# Patient Record
Sex: FEMALE | Race: WHITE | Employment: FULL TIME | URBAN - METROPOLITAN AREA
[De-identification: names, ages, dates, MRNs, and addresses within clinical notes are randomized per-mention and may not be internally consistent; named-entity substitution may affect disease eponyms.]

---

## 2017-01-01 ENCOUNTER — HOSPITAL ENCOUNTER (EMERGENCY)
Age: 62
Discharge: HOME | End: 2017-01-01
Attending: EMERGENCY MEDICINE

## 2017-01-01 VITALS
OXYGEN SATURATION: 100 % | BODY MASS INDEX: 38.57 KG/M2 | SYSTOLIC BLOOD PRESSURE: 136 MMHG | HEIGHT: 66 IN | DIASTOLIC BLOOD PRESSURE: 73 MMHG | RESPIRATION RATE: 14 BRPM | HEART RATE: 72 BPM | TEMPERATURE: 98 F | WEIGHT: 240 LBS

## 2017-01-01 DIAGNOSIS — M54.16 LUMBAR RADICULOPATHY: Primary | ICD-10-CM

## 2017-01-01 LAB
BASOPHILS # BLD AUTO: 0.04 X10(3) UL (ref 0–0.1)
BASOPHILS NFR BLD AUTO: 0.4 %
BUN BLD-MCNC: 35 MG/DL (ref 8–20)
CALCIUM BLD-MCNC: 8.9 MG/DL (ref 8.3–10.3)
CHLORIDE: 105 MMOL/L (ref 101–111)
CO2: 27 MMOL/L (ref 22–32)
CREAT BLD-MCNC: 1.16 MG/DL (ref 0.55–1.02)
EOSINOPHIL # BLD AUTO: 0.3 X10(3) UL (ref 0–0.3)
EOSINOPHIL NFR BLD AUTO: 3.1 %
ERYTHROCYTE [DISTWIDTH] IN BLOOD BY AUTOMATED COUNT: 13.7 % (ref 11.5–16)
GLUCOSE BLD-MCNC: 99 MG/DL (ref 70–99)
HCT VFR BLD AUTO: 41.2 % (ref 34–50)
HGB BLD-MCNC: 13.7 G/DL (ref 12–16)
IMMATURE GRANULOCYTE COUNT: 0.04 X10(3) UL (ref 0–1)
IMMATURE GRANULOCYTE RATIO %: 0.4 %
LYMPHOCYTES # BLD AUTO: 2.49 X10(3) UL (ref 0.9–4)
LYMPHOCYTES NFR BLD AUTO: 25.7 %
MCH RBC QN AUTO: 30.6 PG (ref 27–33.2)
MCHC RBC AUTO-ENTMCNC: 33.3 G/DL (ref 31–37)
MCV RBC AUTO: 92.2 FL (ref 81–100)
MONOCYTES # BLD AUTO: 0.52 X10(3) UL (ref 0.1–0.6)
MONOCYTES NFR BLD AUTO: 5.4 %
NEUTROPHIL ABS PRELIM: 6.28 X10 (3) UL (ref 1.3–6.7)
NEUTROPHILS # BLD AUTO: 6.28 X10(3) UL (ref 1.3–6.7)
NEUTROPHILS NFR BLD AUTO: 65 %
PLATELET # BLD AUTO: 305 10(3)UL (ref 150–450)
POTASSIUM SERPL-SCNC: 3.4 MMOL/L (ref 3.6–5.1)
RBC # BLD AUTO: 4.47 X10(6)UL (ref 3.8–5.1)
RED CELL DISTRIBUTION WIDTH-SD: 46.6 FL (ref 35.1–46.3)
SODIUM SERPL-SCNC: 142 MMOL/L (ref 136–144)
WBC # BLD AUTO: 9.7 X10(3) UL (ref 4–13)

## 2017-01-01 PROCEDURE — 80048 BASIC METABOLIC PNL TOTAL CA: CPT | Performed by: EMERGENCY MEDICINE

## 2017-01-01 PROCEDURE — 85025 COMPLETE CBC W/AUTO DIFF WBC: CPT | Performed by: EMERGENCY MEDICINE

## 2017-01-01 PROCEDURE — 99284 EMERGENCY DEPT VISIT MOD MDM: CPT

## 2017-01-01 PROCEDURE — 96374 THER/PROPH/DIAG INJ IV PUSH: CPT

## 2017-01-01 PROCEDURE — 96375 TX/PRO/DX INJ NEW DRUG ADDON: CPT

## 2017-01-01 RX ORDER — ONDANSETRON 2 MG/ML
4 INJECTION INTRAMUSCULAR; INTRAVENOUS ONCE
Status: COMPLETED | OUTPATIENT
Start: 2017-01-01 | End: 2017-01-01

## 2017-01-01 RX ORDER — HYDROMORPHONE HYDROCHLORIDE 1 MG/ML
1 INJECTION, SOLUTION INTRAMUSCULAR; INTRAVENOUS; SUBCUTANEOUS EVERY 30 MIN PRN
Status: DISCONTINUED | OUTPATIENT
Start: 2017-01-01 | End: 2017-01-01

## 2017-01-01 RX ORDER — HYDROCODONE BITARTRATE AND ACETAMINOPHEN 5; 325 MG/1; MG/1
1-2 TABLET ORAL EVERY 4 HOURS PRN
Qty: 20 TABLET | Refills: 0 | Status: SHIPPED | OUTPATIENT
Start: 2017-01-01 | End: 2017-01-08

## 2017-01-01 NOTE — ED INITIAL ASSESSMENT (HPI)
Pt with lower back pain, history of bulging disc, putting off back surgery, leg has been feeling heavy. Lifted left leg today and pain was worse.

## 2017-01-02 NOTE — ED PROVIDER NOTES
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  Constitutional and vital signs reviewed. All other systems reviewed and negative except as noted above. PSFH elements reviewed from today and agreed except as otherwise stated in HPI.     Physical Exam       ED Triage Vitals   BP 01/01/17 1758 view results for these tests on the individual orders. MDM   Pain is improved here in the emergency department she is able to move her left leg.   She will be discharged to follow-up with the spine surgery group is to return to the emergency departmen

## 2017-01-07 ENCOUNTER — PRIOR ORIGINAL RECORDS (OUTPATIENT)
Dept: OTHER | Age: 62
End: 2017-01-07

## 2017-01-16 LAB
ALBUMIN: 4.1 G/DL
ALKALINE PHOSPHATATE(ALK PHOS): 83 IU/L
BILIRUBIN TOTAL: 0.7 MG/DL
BUN: 25 MG/DL
CALCIUM: 9.6 MG/DL
CHLORIDE: 104 MEQ/L
CHOLESTEROL, TOTAL: 266 MG/DL
CREATININE, SERUM: 0.87 MG/DL
GLOBULIN: 2.5 G/DL
GLUCOSE: 97 MG/DL
HDL CHOLESTEROL: 50 MG/DL
LDL CHOLESTEROL: 191 MG/DL
POTASSIUM, SERUM: 4.3 MEQ/L
PROTEIN, TOTAL: 6.6 G/DL
SGOT (AST): 21 IU/L
SGPT (ALT): 23 IU/L
SODIUM: 142 MEQ/L
TRIGLYCERIDES: 127 MG/DL

## 2017-01-26 ENCOUNTER — OFFICE VISIT (OUTPATIENT)
Dept: SURGERY | Facility: CLINIC | Age: 62
End: 2017-01-26

## 2017-01-26 VITALS
RESPIRATION RATE: 16 BRPM | HEIGHT: 66 IN | WEIGHT: 245 LBS | HEART RATE: 70 BPM | SYSTOLIC BLOOD PRESSURE: 122 MMHG | DIASTOLIC BLOOD PRESSURE: 84 MMHG | BODY MASS INDEX: 39.37 KG/M2

## 2017-01-26 DIAGNOSIS — D35.2 PITUITARY ADENOMA (HCC): ICD-10-CM

## 2017-01-26 DIAGNOSIS — M51.37 DDD (DEGENERATIVE DISC DISEASE), LUMBOSACRAL: ICD-10-CM

## 2017-01-26 DIAGNOSIS — M54.16 LUMBAR RADICULOPATHY: Primary | ICD-10-CM

## 2017-01-26 DIAGNOSIS — M41.9 SCOLIOSIS OF LUMBAR SPINE, UNSPECIFIED SCOLIOSIS TYPE: ICD-10-CM

## 2017-01-26 PROCEDURE — 99215 OFFICE O/P EST HI 40 MIN: CPT | Performed by: NURSE PRACTITIONER

## 2017-01-26 NOTE — PROGRESS NOTES
NEUROSURGERY CLINIC VISIT      Marciano Weiss  11/2/1955  * No surgery found *    Patient presents with:   Other: surgery discussion        HPI:   Marciano Weiss is a 62year old female here fo developed left hand N/T at the onset of her groin pain. Left hand numbness is to the whole hand and has slightly improved since onset. Subjective weakness to the left leg and low back. Left lateral thigh N/T down to the Left lateral calf.   Left lateral c MG) BY MOUTH EVERY 8 HOURS AS NEEDED FOR MUSCLE SPASM Disp: 90 tablet Rfl: 0   POTASSIUM CHLORIDE ER 10 MEQ Oral Tab CR TAKE 1 TABLET BY MOUTH TWICE DAILY Disp: 60 tablet Rfl: 0   bumetanide 2 MG Oral Tab TAKE 2 TABLETS BY MOUTH EVERY MORNING Disp: 180 tab Pulse 70  Resp 16  Ht 66\"  Wt 245 lb  BMI 39.56 kg/m2  LMP 01/20/2001 (Approximate)    NEUROLOGICAL:  This patient is alert and orientated x 3. Speech fluent. Comprehension intact. Pupils equally round and reactive to light. 3+ brisk bilaterally.   EOMs lateral recess. Remainder of MRI of the lumbar spine has not significantly changed redemonstrating disc bulges L3-4 L4-5 and L5-S1 with mild to moderate neural foraminal narrowing on the left at L4-5 and bilaterally at L5-S1.  There is mild right neural fo

## 2017-01-26 NOTE — PATIENT INSTRUCTIONS
Refill policies:    • Allow 2 business days for refills; controlled substances may take longer.   • Contact your pharmacy at least 5 days prior to running out of medication and have them send an electronic request or submit request through the “request re your physician has recommended that you have a procedure or additional testing performed. [facility]) will contact your insurance carrier to obtain pre-certification or prior authorization.     Unfortunately, LORI has seen an increas

## 2017-01-27 ENCOUNTER — TELEPHONE (OUTPATIENT)
Dept: SURGERY | Facility: CLINIC | Age: 62
End: 2017-01-27

## 2017-01-27 ENCOUNTER — PRIOR ORIGINAL RECORDS (OUTPATIENT)
Dept: OTHER | Age: 62
End: 2017-01-27

## 2017-01-27 NOTE — TELEPHONE ENCOUNTER
Prior authorization request for MRI Lumbar ([id number]) submitted via [employer]IM online, request pending clinical review, case determination expected by 1/31/17.

## 2017-01-30 RX ORDER — METHOCARBAMOL 500 MG/1
TABLET, FILM COATED ORAL
Qty: 90 TABLET | Refills: 0 | Status: SHIPPED | OUTPATIENT
Start: 2017-01-30 | End: 2017-03-26

## 2017-01-30 NOTE — TELEPHONE ENCOUNTER
Medication: Methocarbamol 500 MG    Date of last refill: 1/3/17  [place]    Last office visit: 3/28/16  Due back to clinic per last office note: Follow up in clinic 2-4 weeks after last injection.    Date next office

## 2017-01-30 NOTE — TELEPHONE ENCOUNTER
Left message at home number, MRI Lumbar ordered by Dr. Bernie Momin has been approved by your insurance, authorization # [id number] and is approved through 1.27.17-2.25.17. Please have procedure completed before 2.25.17.  Schedule at your convenience with the centr

## 2017-02-07 RX ORDER — POTASSIUM CHLORIDE 750 MG/1
TABLET, EXTENDED RELEASE ORAL
Qty: 60 TABLET | Refills: 0 | Status: SHIPPED | OUTPATIENT
Start: 2017-02-07 | End: 2017-03-26

## 2017-02-11 ENCOUNTER — TELEPHONE (OUTPATIENT)
Dept: SURGERY | Facility: CLINIC | Age: 62
End: 2017-02-11

## 2017-02-11 ENCOUNTER — HOSPITAL ENCOUNTER (OUTPATIENT)
Dept: MRI IMAGING | Facility: HOSPITAL | Age: 62
Discharge: HOME | End: 2017-02-11
Attending: NURSE PRACTITIONER
Payer: COMMERCIAL

## 2017-02-11 ENCOUNTER — LAB ENCOUNTER (OUTPATIENT)
Dept: LAB | Facility: HOSPITAL | Age: 62
End: 2017-02-11
Attending: NURSE PRACTITIONER
Payer: COMMERCIAL

## 2017-02-11 DIAGNOSIS — M41.9 SCOLIOSIS OF LUMBAR SPINE, UNSPECIFIED SCOLIOSIS TYPE: ICD-10-CM

## 2017-02-11 DIAGNOSIS — M46.46 DISCITIS OF LUMBAR REGION: Primary | ICD-10-CM

## 2017-02-11 DIAGNOSIS — M51.37 DDD (DEGENERATIVE DISC DISEASE), LUMBOSACRAL: ICD-10-CM

## 2017-02-11 DIAGNOSIS — M54.16 LUMBAR RADICULOPATHY: ICD-10-CM

## 2017-02-11 LAB
BASOPHILS # BLD AUTO: 0.04 X10(3) UL (ref 0–0.1)
BASOPHILS NFR BLD AUTO: 0.4 %
C-REACTIVE PROTEIN: 0.36 MG/DL (ref ?–1)
EOSINOPHIL # BLD AUTO: 0.36 X10(3) UL (ref 0–0.3)
EOSINOPHIL NFR BLD AUTO: 3.8 %
ERYTHROCYTE [DISTWIDTH] IN BLOOD BY AUTOMATED COUNT: 13.8 % (ref 11.5–16)
HCT VFR BLD AUTO: 40.1 % (ref 34–50)
HGB BLD-MCNC: 13.2 G/DL (ref 12–16)
IMMATURE GRANULOCYTE COUNT: 0.02 X10(3) UL (ref 0–1)
IMMATURE GRANULOCYTE RATIO %: 0.2 %
LYMPHOCYTES # BLD AUTO: 2.81 X10(3) UL (ref 0.9–4)
LYMPHOCYTES NFR BLD AUTO: 29.4 %
MCH RBC QN AUTO: 31.1 PG (ref 27–33.2)
MCHC RBC AUTO-ENTMCNC: 32.9 G/DL (ref 31–37)
MCV RBC AUTO: 94.6 FL (ref 81–100)
MONOCYTES # BLD AUTO: 0.63 X10(3) UL (ref 0.1–0.6)
MONOCYTES NFR BLD AUTO: 6.6 %
NEUTROPHIL ABS PRELIM: 5.7 X10 (3) UL (ref 1.3–6.7)
NEUTROPHILS # BLD AUTO: 5.7 X10(3) UL (ref 1.3–6.7)
NEUTROPHILS NFR BLD AUTO: 59.6 %
PLATELET # BLD AUTO: 275 10(3)UL (ref 150–450)
RBC # BLD AUTO: 4.24 X10(6)UL (ref 3.8–5.1)
RED CELL DISTRIBUTION WIDTH-SD: 47.4 FL (ref 35.1–46.3)
SED RATE-ML: 22 MM/HR (ref 0–25)
WBC # BLD AUTO: 9.6 X10(3) UL (ref 4–13)

## 2017-02-11 PROCEDURE — 85652 RBC SED RATE AUTOMATED: CPT

## 2017-02-11 PROCEDURE — 85025 COMPLETE CBC W/AUTO DIFF WBC: CPT

## 2017-02-11 PROCEDURE — 36415 COLL VENOUS BLD VENIPUNCTURE: CPT

## 2017-02-11 PROCEDURE — 72148 MRI LUMBAR SPINE W/O DYE: CPT

## 2017-02-11 PROCEDURE — 86140 C-REACTIVE PROTEIN: CPT

## 2017-02-11 NOTE — TELEPHONE ENCOUNTER
Spoke w/ Dr Juan C Forrester regarding MRI lumbar spine. Endplate irregularity at L2-3 that is progressed with fluid collection, minimal.  Possible discitis osteomyelitis cannot be entirely excluded. CBC, CRP, ESR ordered.  Spoke with pt regarding above she wi

## 2017-02-13 NOTE — TELEPHONE ENCOUNTER
Could you please call this pt and let her know her lab results were WNL. i have ordered MRI lumbar spine. She can f/u w/ me on a Thursday following this.

## 2017-02-20 DIAGNOSIS — M54.9 BACK PAIN, UNSPECIFIED BACK LOCATION, UNSPECIFIED BACK PAIN LATERALITY, UNSPECIFIED CHRONICITY: ICD-10-CM

## 2017-02-20 RX ORDER — DICLOFENAC SODIUM 75 MG/1
TABLET, DELAYED RELEASE ORAL
Qty: 60 TABLET | Refills: 1 | Status: SHIPPED | OUTPATIENT
Start: 2017-02-20 | End: 2017-06-20

## 2017-02-20 NOTE — TELEPHONE ENCOUNTER
Medication: Diclofenac    Date of last refill: 11/21/16 for #60/2 additional refills  [place]    Last office visit: 4/8/16  Due back to clinic per last office note:  PRN/1 year  Date next office visit scheduled:  not

## 2017-02-27 ENCOUNTER — TELEPHONE (OUTPATIENT)
Dept: SURGERY | Facility: CLINIC | Age: 62
End: 2017-02-27

## 2017-02-27 NOTE — TELEPHONE ENCOUNTER
MRI order is in system for MRI lumbar spine with contrast  Patient informed, wants to know if MRI has been authorized  Will check with Prior Auth dept and have them contact the patient.

## 2017-02-28 NOTE — TELEPHONE ENCOUNTER
Authorization #[id number] for MRI Lumbar contrast [id number] at [address] [place] [address] exp 3-29-17. Called patient gave her above information she will call and schedule.

## 2017-03-11 ENCOUNTER — PRIOR ORIGINAL RECORDS (OUTPATIENT)
Dept: OTHER | Age: 62
End: 2017-03-11

## 2017-03-13 LAB
ALBUMIN: 4.1 G/DL
ALKALINE PHOSPHATATE(ALK PHOS): 85 IU/L
ALT (SGPT): 25 U/L
AST (SGOT): 22 U/L
BILIRUBIN TOTAL: 0.6 MG/DL
BUN: 21 MG/DL
CALCIUM: 9.6 MG/DL
CHLORIDE: 105 MEQ/L
CHOLESTEROL, TOTAL: 202 MG/DL
CREATININE, SERUM: 0.93 MG/DL
GLOBULIN: 2.5 G/DL
GLUCOSE: 102 MG/DL
GLUCOSE: 102 MG/DL
HDL CHOLESTEROL: 48 MG/DL
LDL CHOLESTEROL: 130 MG/DL
NON-HDL CHOLESTEROL: 154 MG/DL
POTASSIUM, SERUM: 4.9 MEQ/L
PROTEIN, TOTAL: 6.6 G/DL
SGOT (AST): 22 IU/L
SGPT (ALT): 25 IU/L
SODIUM: 145 MEQ/L
TRIGLYCERIDES: 122 MG/DL

## 2017-03-18 ENCOUNTER — HOSPITAL ENCOUNTER (OUTPATIENT)
Dept: MRI IMAGING | Facility: HOSPITAL | Age: 62
Discharge: HOME | End: 2017-03-18
Attending: NURSE PRACTITIONER
Payer: COMMERCIAL

## 2017-03-18 DIAGNOSIS — M46.46 DISCITIS OF LUMBAR REGION: ICD-10-CM

## 2017-03-18 PROCEDURE — A9575 INJ GADOTERATE MEGLUMI 0.1ML: HCPCS | Performed by: NURSE PRACTITIONER

## 2017-03-18 PROCEDURE — 72158 MRI LUMBAR SPINE W/O & W/DYE: CPT

## 2017-03-21 ENCOUNTER — MYAURORA ACCOUNT LINK (OUTPATIENT)
Dept: OTHER | Age: 62
End: 2017-03-21

## 2017-03-21 ENCOUNTER — PRIOR ORIGINAL RECORDS (OUTPATIENT)
Dept: OTHER | Age: 62
End: 2017-03-21

## 2017-03-23 ENCOUNTER — TELEPHONE (OUTPATIENT)
Dept: SURGERY | Facility: CLINIC | Age: 62
End: 2017-03-23

## 2017-03-23 DIAGNOSIS — M54.16 LUMBAR RADICULOPATHY: Primary | ICD-10-CM

## 2017-03-23 DIAGNOSIS — M51.36 DDD (DEGENERATIVE DISC DISEASE), LUMBAR: ICD-10-CM

## 2017-03-23 NOTE — TELEPHONE ENCOUNTER
Spoke to patient regarding her MRI lumbar spine with and without contrast.  Extensive degenerative disease in the lumbar spine.   Dr. Ronaldo Buitrago and Dr. Cara Watters also reviewed MRI and recommendation is CT scan of the lumbar spine and she should follow-up with

## 2017-03-24 ENCOUNTER — TELEPHONE (OUTPATIENT)
Dept: NEUROLOGY | Facility: CLINIC | Age: 62
End: 2017-03-24

## 2017-03-24 NOTE — TELEPHONE ENCOUNTER
Spoke to pt, CT Lumbar ordered by Dr. Ernesto Turcios has been approved by your insurance, authorization # [id number] and is approved through 3.24.17-4.22.17. Please have procedure completed before 4.22.17.  Schedule at your convenience with the central scheduling dep

## 2017-03-26 ENCOUNTER — PATIENT MESSAGE (OUTPATIENT)
Dept: SURGERY | Facility: CLINIC | Age: 62
End: 2017-03-26

## 2017-03-27 NOTE — TELEPHONE ENCOUNTER
From: Dick Talavera  To: Jonathan Potter MD  Sent: 3/26/2017 7:23 AM CDT  Subject: Test Results Question    If possible this message is for Paulette Sol from Dr. Kym Lefort office. ..   I have scheduled my CT scan for April 3rd and per your instructions I have s

## 2017-03-27 NOTE — TELEPHONE ENCOUNTER
Medication: Methocarbamol 500 MG    Date of last refill: 1/30/17  [place]    Last office visit: 3/28/16  Due back to clinic per last office note:  2-4 weeks after last injection  Date next office visit scheduled:  Not

## 2017-03-28 RX ORDER — POTASSIUM CHLORIDE 750 MG/1
TABLET, EXTENDED RELEASE ORAL
Qty: 60 TABLET | Refills: 0 | Status: SHIPPED | OUTPATIENT
Start: 2017-03-28 | End: 2017-06-11

## 2017-03-28 RX ORDER — METHOCARBAMOL 500 MG/1
TABLET, FILM COATED ORAL
Qty: 90 TABLET | Refills: 0 | Status: SHIPPED | OUTPATIENT
Start: 2017-03-28 | End: 2017-06-20

## 2017-04-03 ENCOUNTER — HOSPITAL ENCOUNTER (OUTPATIENT)
Dept: CT IMAGING | Facility: HOSPITAL | Age: 62
Discharge: HOME | End: 2017-04-03
Attending: NURSE PRACTITIONER
Payer: COMMERCIAL

## 2017-04-03 DIAGNOSIS — M54.16 LUMBAR RADICULOPATHY: ICD-10-CM

## 2017-04-03 DIAGNOSIS — M51.36 DDD (DEGENERATIVE DISC DISEASE), LUMBAR: ICD-10-CM

## 2017-04-03 PROCEDURE — 72131 CT LUMBAR SPINE W/O DYE: CPT

## 2017-04-13 PROCEDURE — 82085 ASSAY OF ALDOLASE: CPT | Performed by: INTERNAL MEDICINE

## 2017-04-15 ENCOUNTER — MYAURORA ACCOUNT LINK (OUTPATIENT)
Dept: OTHER | Age: 62
End: 2017-04-15

## 2017-04-15 ENCOUNTER — HOSPITAL ENCOUNTER (OUTPATIENT)
Dept: CV DIAGNOSTICS | Facility: HOSPITAL | Age: 62
Discharge: HOME | End: 2017-04-15
Attending: INTERNAL MEDICINE

## 2017-04-15 DIAGNOSIS — I48.0 PAROXYSMAL ATRIAL FIBRILLATION (HCC): ICD-10-CM

## 2017-04-15 DIAGNOSIS — R60.9 EDEMA, UNSPECIFIED TYPE: ICD-10-CM

## 2017-04-15 DIAGNOSIS — I10 ESSENTIAL HYPERTENSION, BENIGN: ICD-10-CM

## 2017-04-19 ENCOUNTER — PATIENT MESSAGE (OUTPATIENT)
Dept: FAMILY MEDICINE CLINIC | Facility: CLINIC | Age: 62
End: 2017-04-19

## 2017-04-19 RX ORDER — FLUCONAZOLE 150 MG/1
150 TABLET ORAL
Qty: 2 TABLET | Refills: 0 | Status: SHIPPED | OUTPATIENT
Start: 2017-04-19 | End: 2017-04-23

## 2017-04-19 NOTE — TELEPHONE ENCOUNTER
From: Ranjeet Gonzalez  To: Romero Rm MD  Sent: 4/19/2017 5:47 AM CDT  Subject: Prescription Question    I have a dental appointment today and I have to pre-medicate with 800 mg Amoxicillin.  I will get a yeast infection if I do not take the pill to

## 2017-04-24 RX ORDER — DICLOFENAC SODIUM 75 MG/1
TABLET, DELAYED RELEASE ORAL
Qty: 60 TABLET | Refills: 0 | OUTPATIENT
Start: 2017-04-24

## 2017-04-24 NOTE — TELEPHONE ENCOUNTER
Confirmed with patient she is following Dr. Garland Jewell and is prescribing her medications at this time. Refused medication. Per LOV on 04/08/17 by Dr. Rebel Barone;   She has three episodes of double vision and bluured vision as well as tiredness in pm, better i

## 2017-04-25 ENCOUNTER — OFFICE VISIT (OUTPATIENT)
Dept: SURGERY | Facility: CLINIC | Age: 62
End: 2017-04-25

## 2017-04-25 ENCOUNTER — TELEPHONE (OUTPATIENT)
Dept: SURGERY | Facility: CLINIC | Age: 62
End: 2017-04-25

## 2017-04-25 VITALS
SYSTOLIC BLOOD PRESSURE: 110 MMHG | HEIGHT: 66 IN | RESPIRATION RATE: 16 BRPM | HEART RATE: 60 BPM | WEIGHT: 250 LBS | BODY MASS INDEX: 40.18 KG/M2 | DIASTOLIC BLOOD PRESSURE: 68 MMHG

## 2017-04-25 DIAGNOSIS — M54.16 LUMBAR RADICULOPATHY: ICD-10-CM

## 2017-04-25 DIAGNOSIS — M47.816 LUMBAR FACET ARTHROPATHY: ICD-10-CM

## 2017-04-25 DIAGNOSIS — M41.9 SCOLIOSIS OF LUMBOSACRAL SPINE, UNSPECIFIED SCOLIOSIS TYPE: Primary | ICD-10-CM

## 2017-04-25 DIAGNOSIS — M51.37 DDD (DEGENERATIVE DISC DISEASE), LUMBOSACRAL: ICD-10-CM

## 2017-04-25 PROCEDURE — 99214 OFFICE O/P EST MOD 30 MIN: CPT | Performed by: NURSE PRACTITIONER

## 2017-04-25 RX ORDER — PRAVASTATIN SODIUM 20 MG
20 TABLET ORAL NIGHTLY
Refills: 6 | COMMUNITY
Start: 2017-02-20 | End: 2018-03-07

## 2017-04-25 NOTE — PROGRESS NOTES
Pt is here for follow up on testing (mri and ct)  Pain currently 5/10 mostly lower back and legs, no changes in pain since last visit

## 2017-04-25 NOTE — PATIENT INSTRUCTIONS
Refill policies:    • Allow 2 business days for refills; controlled substances may take longer.   • Contact your pharmacy at least 5 days prior to running out of medication and have them send an electronic request or submit request through the “request re insurance carrier to obtain pre-certification or prior authorization. Unfortunately, LORI has seen an increase in denial of payment even though the procedure/test has been pre-certified.   You are strongly encouraged to contact your insurance carrier to v prior to your procedure.       Medication:   Number of days you need to be off for the following medications:  • Aggrenox 10 days   • Agrylin (Anagrelide) 10 days   • Enbrel (Etanercept) 24 hours   • Fragmin (Dalteparin) 24 hours   • Pletal (Cilostazol) 7 d sugar.  Contact your primary care physician if your blood sugar rises as you may require some medication adjustment.   It is normal to have increased pain at injection site for up to 3-5 days after procedure, you can use heat for the first 24 hours (20 forrest patients choose to receive intravenous sedation that can make the procedure easier to tolerate. This type of sedation may cause amnesia and patients may not remember parts or all of the actual procedure. How is the epidural injection performed?   It is do injections have not helped you very much, you may be a candidate for a different procedure. It is important to keep follow up appointment to clearly communicate with your provider how you are feeling. Will the epidural injection help me?   It is very diffi

## 2017-04-25 NOTE — TELEPHONE ENCOUNTER
Medical clearance letter electronically faxed to Dr. Rebekah Monsalve office. Pt takes ASA 325mg. Spoke w/ pt regarding need for medical clearance prior to procedures with Dr. Parmjit Boateng. Pt verbalized understanding.

## 2017-04-25 NOTE — PROGRESS NOTES
NEUROSURGERY CLINIC VISIT      Tori Key  11/2/1955  * No surgery found *    Patient presents with:  Test Results: follow up after test        HPI:   Tori Key is a 62year old femal localized, unspecified site    • Back problem    • Cancer ([facility] [address] 2012     skin cancer removed  from leg   • Hip arthrosis    • Cellulitis      Rt. leg ankle   • Arrhythmia      A-fib   • Visual impairment    • PONV (postoperative nausea and vomiting)    • M mg by mouth daily.  Disp:  Rfl:    DICLOFENAC SODIUM 75 MG Oral Tab EC TAKE 1 TABLET BY MOUTH TWICE DAILY Disp: 60 tablet Rfl: 1     Family History   Problem Relation Age of Onset   • Hypertension Mother    • Hypertension Father        Smoking Status: Sharron Walker lumbosacral spine, unspecified scoliosis type    2. Lumbar radiculopathy    3. DDD (degenerative disc disease), lumbosacral    4. Lumbar facet arthropathy      1. Follow up after injection with Dr. Ciarra Chan  2. Lumbar spine with flexion and extension   3.   Re

## 2017-04-26 NOTE — TELEPHONE ENCOUNTER
Spoke to Farrukh Grant at Dr Selena Carlisle office in re to Medical Clearance Letter sent 4/25 for Dr Glenn Henriquez upcoming procedures-Mayte asked letter be refaxed-Medical Clearance Letter refaxed to [phone]-fax complete

## 2017-04-28 ENCOUNTER — TELEPHONE (OUTPATIENT)
Dept: NEUROLOGY | Facility: CLINIC | Age: 62
End: 2017-04-28

## 2017-04-28 NOTE — TELEPHONE ENCOUNTER
Response received from Dr Shashank Hess in re to Medical Clearance Letter sent 4/25 for upcoming Dr Elsa Mendoza procedures,endorsed to Pain Nurse Medical Clearance folder in [address] [place] [address]

## 2017-05-01 NOTE — TELEPHONE ENCOUNTER
LMTCB. Per Dr. Malissa Garcia, he may see patient as second opinion from Dr. Rhea Knight but does note that they are both neuromuscular specialists. Per Epic review patient was encouraged to seek outside second opinion at tertiary center.

## 2017-05-01 NOTE — TELEPHONE ENCOUNTER
Discussed with Yumi recommendations for outside second opinion at tertiary center. Patient states she will investigate and make appointment. Invited patient to make appointment with local neurologist if needed. Patient appreciative of discussion.

## 2017-05-04 NOTE — TELEPHONE ENCOUNTER
Spoke with patient and informed patient of message below. Pt verbalized understanding. No further questions at this time. Document sent to scanning.

## 2017-05-05 ENCOUNTER — PATIENT MESSAGE (OUTPATIENT)
Dept: SURGERY | Facility: CLINIC | Age: 62
End: 2017-05-05

## 2017-05-08 NOTE — TELEPHONE ENCOUNTER
Pt needs to have medical records sent to Dr. Migel Armstrong. Form needs to be signed, in [address].

## 2017-05-08 NOTE — TELEPHONE ENCOUNTER
From: Milan Douglas  To: MOISE Corey  Sent: 5/5/2017 6:16 PM CDT  Subject: Visit Follow-up Question    Jodi Pepe,  I have made an appointment with Dr. Gustavo An in [address] [place] [address] he is a Neuro doc that specializeds in NMD he is with [facility].  I h

## 2017-05-15 ENCOUNTER — TELEPHONE (OUTPATIENT)
Dept: NEUROLOGY | Facility: CLINIC | Age: 62
End: 2017-05-15

## 2017-05-15 NOTE — TELEPHONE ENCOUNTER
Faxed TENA to number indicated. Fax confirmation received.
Patient states she cannot come in person to sign TENA  Informed her that we could fax it to her and she could fax it back  She will call with fax # when she gets back to her office.
16

## 2017-05-18 ENCOUNTER — TELEPHONE (OUTPATIENT)
Dept: SURGERY | Facility: CLINIC | Age: 62
End: 2017-05-18

## 2017-05-30 RX ORDER — GABAPENTIN 300 MG/1
CAPSULE ORAL
Qty: 90 CAPSULE | Refills: 0 | OUTPATIENT
Start: 2017-05-30

## 2017-06-01 ENCOUNTER — TELEPHONE (OUTPATIENT)
Dept: NEUROLOGY | Facility: CLINIC | Age: 62
End: 2017-06-01

## 2017-06-01 NOTE — TELEPHONE ENCOUNTER
[facility]    [facility]#[id number] DOS 6.5.17

## 2017-06-05 ENCOUNTER — APPOINTMENT (OUTPATIENT)
Dept: GENERAL RADIOLOGY | Facility: HOSPITAL | Age: 62
End: 2017-06-05
Attending: ANESTHESIOLOGY
Payer: COMMERCIAL

## 2017-06-05 ENCOUNTER — HOSPITAL ENCOUNTER (OUTPATIENT)
Dept: GENERAL RADIOLOGY | Facility: HOSPITAL | Age: 62
Discharge: HOME | End: 2017-06-05
Attending: NURSE PRACTITIONER
Payer: COMMERCIAL

## 2017-06-05 ENCOUNTER — HOSPITAL ENCOUNTER (OUTPATIENT)
Facility: HOSPITAL | Age: 62
Setting detail: HOSPITAL OUTPATIENT SURGERY
Discharge: HOME | End: 2017-06-05
Attending: ANESTHESIOLOGY | Admitting: ANESTHESIOLOGY
Payer: COMMERCIAL

## 2017-06-05 ENCOUNTER — SURGERY (OUTPATIENT)
Age: 62
End: 2017-06-05

## 2017-06-05 VITALS
TEMPERATURE: 98 F | DIASTOLIC BLOOD PRESSURE: 63 MMHG | OXYGEN SATURATION: 100 % | HEART RATE: 57 BPM | RESPIRATION RATE: 18 BRPM | SYSTOLIC BLOOD PRESSURE: 124 MMHG

## 2017-06-05 DIAGNOSIS — M47.816 LUMBAR FACET ARTHROPATHY: ICD-10-CM

## 2017-06-05 DIAGNOSIS — M54.16 LUMBAR RADICULOPATHY: ICD-10-CM

## 2017-06-05 DIAGNOSIS — M41.9 SCOLIOSIS OF LUMBOSACRAL SPINE, UNSPECIFIED SCOLIOSIS TYPE: ICD-10-CM

## 2017-06-05 DIAGNOSIS — M51.37 DDD (DEGENERATIVE DISC DISEASE), LUMBOSACRAL: ICD-10-CM

## 2017-06-05 PROCEDURE — 72114 X-RAY EXAM L-S SPINE BENDING: CPT | Performed by: NURSE PRACTITIONER

## 2017-06-05 PROCEDURE — 99152 MOD SED SAME PHYS/QHP 5/>YRS: CPT | Performed by: ANESTHESIOLOGY

## 2017-06-05 PROCEDURE — 3E0S3BZ INTRODUCTION OF ANESTHETIC AGENT INTO EPIDURAL SPACE, PERCUTANEOUS APPROACH: ICD-10-PCS | Performed by: ANESTHESIOLOGY

## 2017-06-05 PROCEDURE — 3E0S33Z INTRODUCTION OF ANTI-INFLAMMATORY INTO EPIDURAL SPACE, PERCUTANEOUS APPROACH: ICD-10-PCS | Performed by: ANESTHESIOLOGY

## 2017-06-05 RX ORDER — LIDOCAINE HYDROCHLORIDE 10 MG/ML
INJECTION, SOLUTION EPIDURAL; INFILTRATION; INTRACAUDAL; PERINEURAL AS NEEDED
Status: DISCONTINUED | OUTPATIENT
Start: 2017-06-05 | End: 2017-06-05 | Stop reason: HOSPADM

## 2017-06-05 RX ORDER — SODIUM CHLORIDE, SODIUM LACTATE, POTASSIUM CHLORIDE, CALCIUM CHLORIDE 600; 310; 30; 20 MG/100ML; MG/100ML; MG/100ML; MG/100ML
100 INJECTION, SOLUTION INTRAVENOUS CONTINUOUS
Status: DISCONTINUED | OUTPATIENT
Start: 2017-06-05 | End: 2017-06-05

## 2017-06-05 RX ORDER — DEXAMETHASONE SODIUM PHOSPHATE 10 MG/ML
INJECTION, SOLUTION INTRAMUSCULAR; INTRAVENOUS AS NEEDED
Status: DISCONTINUED | OUTPATIENT
Start: 2017-06-05 | End: 2017-06-05 | Stop reason: HOSPADM

## 2017-06-05 NOTE — H&P
History & Physical Examination    Patient Name: Miriam Ledezma  MRN: [id number]  CSN: [id number]  YOB: 1955    Pre-Operative Diagnosis:  Lumbar radiculopathy [M54.16]  DDD (degenerative disc disease), lumbosacral [M51.37]  Lumbar facet arthro Allergies:   Adhesive Tape           Rash, Itching  Grass                   Itching, Other (See Comments)    Comment:Headaches with blood shot eyes  Latex                   Itching  Other                       Comment:Patient experiences a yeast infe normal, please explain:   KENDAL [x ] [x ]    Clifton Floyd [ ] [ ] Tenderness to palpation bilateral lumbar areas, pain radiates down the legs to the feet bilaterally. Patient experiences muscle tightness in the legs and numbness in the left leg.     HEART [x

## 2017-06-06 NOTE — OPERATIVE REPORT
[facility]  Operative Report  2017     Radha Rayo Patient Status:  Hospital Outpatient Surgery    1955 MRN [id number]   Location [address] [place] [address] Attending No att. providers found   Hosp Day # 0 PCP Pranav Ward position was confirmed with AP and lateral view under fluoroscopy. Omnipaque 180 was injected in 1 mL volume. A good epidurogram was obtained.   Thereafter, dexamethasone 10 mg with normal saline of 5 mL in total volume of 6 mL was injected through the [employer]

## 2017-06-12 RX ORDER — POTASSIUM CHLORIDE 750 MG/1
TABLET, EXTENDED RELEASE ORAL
Qty: 60 TABLET | Refills: 0 | Status: SHIPPED | OUTPATIENT
Start: 2017-06-12 | End: 2017-07-31

## 2017-06-12 NOTE — TELEPHONE ENCOUNTER
Refill request please approve or deny  LOV- 11/28/2016  Last refilled- 3/28/2017  Last labs- 4/13/2017

## 2017-06-20 ENCOUNTER — OFFICE VISIT (OUTPATIENT)
Dept: SURGERY | Facility: CLINIC | Age: 62
End: 2017-06-20

## 2017-06-20 VITALS — HEART RATE: 64 BPM | SYSTOLIC BLOOD PRESSURE: 120 MMHG | DIASTOLIC BLOOD PRESSURE: 70 MMHG | RESPIRATION RATE: 17 BRPM

## 2017-06-20 DIAGNOSIS — M47.816 LUMBAR FACET ARTHROPATHY: Primary | ICD-10-CM

## 2017-06-20 DIAGNOSIS — M51.37 DDD (DEGENERATIVE DISC DISEASE), LUMBOSACRAL: ICD-10-CM

## 2017-06-20 DIAGNOSIS — M54.16 LUMBAR RADICULOPATHY: ICD-10-CM

## 2017-06-20 DIAGNOSIS — M41.9 SCOLIOSIS OF LUMBOSACRAL SPINE, UNSPECIFIED SCOLIOSIS TYPE: ICD-10-CM

## 2017-06-20 PROCEDURE — 99213 OFFICE O/P EST LOW 20 MIN: CPT | Performed by: NEUROLOGICAL SURGERY

## 2017-06-20 NOTE — PATIENT INSTRUCTIONS
Refill policies:    • Allow 2-3 business days for refills; controlled substances may take longer.   • Contact your pharmacy at least 5 days prior to running out of medication and have them send an electronic request or submit request through the [facility] have a procedure or additional testing performed. [facility]) will contact your insurance carrier to obtain pre-certification or prior authorization.     Unfortunately, LORI has seen an increase in denial of payment even though the p

## 2017-06-20 NOTE — PROGRESS NOTES
Neurosurgery Clinic Visit  2017    Radha Rayo PCP:  Ford Duarte MD    1955 MRN [id number]     HISTORY OF PRESENT ILLNESS:  Radha Rayo is a(n) 62year old female who is here for follow-up of back and leg pain.   She continues to h and L5-S1.      ASSESSMENT and PLAN:  1. Low back pain. 2.  Left leg radiculopathy. 3.  Bilateral muscle tightness of arms/legs. 4.  Lumbar spondylosis. Reviewed imaging with the patient.   She has lumbar degenerative changes, and these are contribut

## 2017-07-31 RX ORDER — POTASSIUM CHLORIDE 750 MG/1
TABLET, EXTENDED RELEASE ORAL
Qty: 60 TABLET | Refills: 0 | Status: SHIPPED | OUTPATIENT
Start: 2017-07-31 | End: 2017-09-06

## 2017-08-13 ENCOUNTER — APPOINTMENT (OUTPATIENT)
Dept: GENERAL RADIOLOGY | Age: 62
End: 2017-08-13
Attending: PHYSICIAN ASSISTANT
Payer: COMMERCIAL

## 2017-08-13 ENCOUNTER — HOSPITAL ENCOUNTER (EMERGENCY)
Age: 62
Discharge: HOME | End: 2017-08-13
Attending: EMERGENCY MEDICINE
Payer: COMMERCIAL

## 2017-08-13 ENCOUNTER — APPOINTMENT (OUTPATIENT)
Dept: ULTRASOUND IMAGING | Age: 62
End: 2017-08-13
Attending: PHYSICIAN ASSISTANT
Payer: COMMERCIAL

## 2017-08-13 VITALS
RESPIRATION RATE: 16 BRPM | OXYGEN SATURATION: 100 % | HEIGHT: 66 IN | BODY MASS INDEX: 38.57 KG/M2 | SYSTOLIC BLOOD PRESSURE: 144 MMHG | TEMPERATURE: 99 F | WEIGHT: 240 LBS | HEART RATE: 80 BPM | DIASTOLIC BLOOD PRESSURE: 74 MMHG

## 2017-08-13 DIAGNOSIS — M79.605 PAIN OF LEFT LOWER EXTREMITY DUE TO INJURY: Primary | ICD-10-CM

## 2017-08-13 PROCEDURE — 99284 EMERGENCY DEPT VISIT MOD MDM: CPT

## 2017-08-13 PROCEDURE — 96365 THER/PROPH/DIAG IV INF INIT: CPT

## 2017-08-13 PROCEDURE — 73560 X-RAY EXAM OF KNEE 1 OR 2: CPT | Performed by: PHYSICIAN ASSISTANT

## 2017-08-13 PROCEDURE — 73502 X-RAY EXAM HIP UNI 2-3 VIEWS: CPT | Performed by: PHYSICIAN ASSISTANT

## 2017-08-13 PROCEDURE — 93971 EXTREMITY STUDY: CPT | Performed by: PHYSICIAN ASSISTANT

## 2017-08-13 RX ORDER — CEPHALEXIN 500 MG/1
500 CAPSULE ORAL 3 TIMES DAILY
Qty: 21 CAPSULE | Refills: 0 | Status: SHIPPED | OUTPATIENT
Start: 2017-08-13 | End: 2017-08-20

## 2017-08-13 RX ORDER — FLUCONAZOLE 200 MG/1
200 TABLET ORAL DAILY
Qty: 1 TABLET | Refills: 0 | Status: SHIPPED | OUTPATIENT
Start: 2017-08-13 | End: 2017-09-06

## 2017-08-13 RX ORDER — TRAMADOL HYDROCHLORIDE 50 MG/1
TABLET ORAL EVERY 4 HOURS PRN
Qty: 20 TABLET | Refills: 0 | Status: SHIPPED | OUTPATIENT
Start: 2017-08-13 | End: 2017-08-20

## 2017-08-13 NOTE — ED PROVIDER NOTES
Patient Seen in: [facility] Emergency Department In [place]    History   Patient presents with:  Fall (musculoskeletal, neurologic)  Lower Extremity Injury (musculoskeletal)  Back Pain (musculoskeletal)    Stated Complaint: left leg injury and back pain (three) times daily. TraMADol HCl 50 MG Oral Tab,  Take 1-2 tablets ( mg total) by mouth every 4 (four) hours as needed for Pain.    POTASSIUM CHLORIDE ER 10 MEQ Oral Tab CR,  TAKE 1 TABLET BY MOUTH TWICE DAILY   SYNTHROID 150 MCG Oral Tab,  Take 1 2 Views), Left (emx=08067)    Result Date: 8/13/2017  PROCEDURE:  XR KNEE (1 OR 2 VIEWS), LEFT (CPT=73560)  COMPARISON:  None.   INDICATIONS:  left leg injury and back pain  PATIENT STATED HISTORY: (As transcribed by Technologist)  Patient fell last week in Oscar Ramos MD on 8/13/2017 at 13:01     Approved by: Oscar Ramos MD            Xr Hip W Or Wo Pelvis 2 Or 3 Views, Left (cpt=73502)    Result Date: 8/13/2017  PROCEDURE:  XR HIP W OR WO PELVIS 2 OR 3 VIEWS, LEFT (CPT=73502)  TECHNIQUE:  Unilateral 2 knee will be performed to rule out traumatic etiology. I am concerned for early cellulitis left lower extremity. A gram of Rocephin will be initiated. Patient requested ultrasound of the left lower extremity. No evidence of acute DVT.   Plain films of t

## 2017-08-14 ENCOUNTER — TELEPHONE (OUTPATIENT)
Dept: SURGERY | Facility: CLINIC | Age: 62
End: 2017-08-14

## 2017-08-15 NOTE — TELEPHONE ENCOUNTER
Patient states she had epidural done by Dr. Sylvia Car in June and  she had discussed pain management with MOISE Roper at that time. She is taking Aleve 12 tabs/ day for her back pain. She fell recently and was seen in ER, and was given Tramadol for her leg pain.

## 2017-08-16 NOTE — TELEPHONE ENCOUNTER
Patient understood and stated she thought Mina Spurling, APN with with the pain service. Informed her she is no longer working with the pain service. Patient understood.  Informed her I would relay message on to pain service for refill request.

## 2017-08-16 NOTE — TELEPHONE ENCOUNTER
Patient has not been seen by pain service in over a year and will need an office visit before we can determne if she will qualify medications will be prescribed. She was seen by Magui Haddad and  on 4/25/17.

## 2017-08-17 NOTE — TELEPHONE ENCOUNTER
Spoke with patient and informed patient of message below. Pt verbalized understanding and agreeable to plan of f/u OV to discuss medications and updated imaging. Call transferred to [facility] for scheduling. No further questions at this time.

## 2017-08-24 ENCOUNTER — OFFICE VISIT (OUTPATIENT)
Dept: SURGERY | Facility: CLINIC | Age: 62
End: 2017-08-24

## 2017-08-24 ENCOUNTER — TELEPHONE (OUTPATIENT)
Dept: SURGERY | Facility: CLINIC | Age: 62
End: 2017-08-24

## 2017-08-24 VITALS
RESPIRATION RATE: 18 BRPM | SYSTOLIC BLOOD PRESSURE: 132 MMHG | HEART RATE: 70 BPM | WEIGHT: 268 LBS | DIASTOLIC BLOOD PRESSURE: 86 MMHG | BODY MASS INDEX: 43.07 KG/M2 | OXYGEN SATURATION: 99 % | HEIGHT: 66 IN

## 2017-08-24 DIAGNOSIS — M54.16 LUMBAR RADICULOPATHY: Primary | ICD-10-CM

## 2017-08-24 DIAGNOSIS — M46.1 SACROILIITIS (HCC): ICD-10-CM

## 2017-08-24 DIAGNOSIS — M47.816 LUMBAR FACET ARTHROPATHY: ICD-10-CM

## 2017-08-24 PROCEDURE — 99214 OFFICE O/P EST MOD 30 MIN: CPT | Performed by: NURSE PRACTITIONER

## 2017-08-24 RX ORDER — METHOCARBAMOL 750 MG/1
750 TABLET, FILM COATED ORAL 3 TIMES DAILY PRN
Qty: 90 TABLET | Refills: 0 | Status: SHIPPED | OUTPATIENT
Start: 2017-08-24 | End: 2017-10-06

## 2017-08-24 RX ORDER — HYDROCODONE BITARTRATE AND ACETAMINOPHEN 10; 325 MG/1; MG/1
1 TABLET ORAL EVERY 6 HOURS PRN
COMMUNITY
End: 2017-09-06

## 2017-08-24 RX ORDER — PHENTERMINE HYDROCHLORIDE 37.5 MG/1
37.5 CAPSULE ORAL EVERY MORNING
COMMUNITY
End: 2019-01-23

## 2017-08-24 RX ORDER — TRAMADOL HYDROCHLORIDE 50 MG/1
TABLET ORAL
Qty: 120 TABLET | Refills: 0 | Status: SHIPPED | OUTPATIENT
Start: 2017-08-24 | End: 2017-10-03

## 2017-08-24 NOTE — PATIENT INSTRUCTIONS
Refill policies:    • Allow 2-3 business days for refills; controlled substances may take longer.   • Contact your pharmacy at least 5 days prior to running out of medication and have them send an electronic request or submit request through the [facility] have a procedure or additional testing performed. [facility]) will contact your insurance carrier to obtain pre-certification or prior authorization.     Unfortunately, LORI has seen an increase in denial of payment even though the p

## 2017-08-24 NOTE — PROGRESS NOTES
Name: Carlitos Corral   : 1955   DOS: 2017     Pain Clinic Follow Up Visit:   Carlitos Corral is a 62year old female who presents for recheck of her chronic low back pain.  She states that most of her pain is in the pelvis and feels like menst TraMADol HCl 50 MG Oral Tab Take 1-2 tablets every 6 hours if needed for pain. Disp: 120 tablet Rfl: 0   fluconazole (DIFLUCAN) 200 MG Oral Tab Take 1 tablet (200 mg total) by mouth daily.  Disp: 1 tablet Rfl: 0   POTASSIUM CHLORIDE ER 10 MEQ Oral Tab CR 6:29.     INDICATIONS:  Low back pain radiating into the left lower extremity. History of disc disease most prominently at L2-3, with concern for possible development of discitis/osteomyelitis on recent MRI of 2/11/17.      TECHNIQUE:  Multiplanar T1 and T2 paraspinal region   at this level, likely due to the chronic degenerative changes. 3. Multilevel subarticular neural foraminal stenosis of the mid to lower lumbar spine without significant interval change.   4. If there is continued clinical concern for th 10/24/2017).

## 2017-08-25 ENCOUNTER — TELEPHONE (OUTPATIENT)
Dept: SURGERY | Facility: CLINIC | Age: 62
End: 2017-08-25

## 2017-08-26 NOTE — ED PROVIDER NOTES
Physical exam from 8/13/2017    Gen: Well appearing, morbidly obese, well groomed, alert and aware x 3  Neck: Supple, full range of motion, no thyromegaly or lymphadenopathy.   Eye examination: EOMs are intact, normal conjunctival  ENT: Atraumatic  Lung: No

## 2017-08-30 ENCOUNTER — TELEPHONE (OUTPATIENT)
Dept: SURGERY | Facility: CLINIC | Age: 62
End: 2017-08-30

## 2017-08-30 NOTE — TELEPHONE ENCOUNTER
Please contact patient and let her know that Dr. Karyn Greene has reviewed her lumbar MRI and we have discussed her case.   He does not recommend additional lumbar epidural steroid injections and instead recommends that she consult with the neurosurgery department

## 2017-08-30 NOTE — TELEPHONE ENCOUNTER
Called to inform patient of message below. States that she has an appointment to see Dr. Monik Mariscal in October. Pt verbalized understanding. No further questions.

## 2017-09-02 ENCOUNTER — APPOINTMENT (OUTPATIENT)
Dept: LAB | Facility: HOSPITAL | Age: 62
End: 2017-09-02
Attending: INTERNAL MEDICINE
Payer: COMMERCIAL

## 2017-09-02 ENCOUNTER — PRIOR ORIGINAL RECORDS (OUTPATIENT)
Dept: OTHER | Age: 62
End: 2017-09-02

## 2017-09-02 DIAGNOSIS — D35.2 PITUITARY ADENOMA, GH-PRODUCING (HCC): ICD-10-CM

## 2017-09-02 DIAGNOSIS — E22.0 PITUITARY ADENOMA, GH-PRODUCING (HCC): ICD-10-CM

## 2017-09-02 DIAGNOSIS — E03.9 HYPOTHYROIDISM, UNSPECIFIED TYPE: Chronic | ICD-10-CM

## 2017-09-02 LAB
ALBUMIN SERPL-MCNC: 3.6 G/DL (ref 3.5–4.8)
ALP LIVER SERPL-CCNC: 84 U/L (ref 46–118)
ALT SERPL-CCNC: 25 U/L (ref 14–54)
AST SERPL-CCNC: 17 U/L (ref 15–41)
BILIRUB SERPL-MCNC: 0.7 MG/DL (ref 0.1–2)
BUN BLD-MCNC: 19 MG/DL (ref 8–20)
CALCIUM BLD-MCNC: 9.6 MG/DL (ref 8.3–10.3)
CHLORIDE: 106 MMOL/L (ref 101–111)
CO2: 30 MMOL/L (ref 22–32)
CORTISOL: 7.4 UG/DL
CREAT BLD-MCNC: 0.93 MG/DL (ref 0.55–1.02)
FREE T4: 1.7 NG/DL (ref 0.9–1.8)
GLUCOSE BLD-MCNC: 91 MG/DL (ref 70–99)
M PROTEIN MFR SERPL ELPH: 6.6 G/DL (ref 6.1–8.3)
POTASSIUM SERPL-SCNC: 4.7 MMOL/L (ref 3.6–5.1)
SODIUM SERPL-SCNC: 143 MMOL/L (ref 136–144)
TSI SER-ACNC: 0.75 MIU/ML (ref 0.35–5.5)

## 2017-09-02 PROCEDURE — 84439 ASSAY OF FREE THYROXINE: CPT

## 2017-09-02 PROCEDURE — 36415 COLL VENOUS BLD VENIPUNCTURE: CPT

## 2017-09-02 PROCEDURE — 84443 ASSAY THYROID STIM HORMONE: CPT

## 2017-09-02 PROCEDURE — 82533 TOTAL CORTISOL: CPT

## 2017-09-02 PROCEDURE — 80053 COMPREHEN METABOLIC PANEL: CPT

## 2017-09-11 PROBLEM — Z86.018 HISTORY OF PITUITARY ADENOMA: Status: ACTIVE | Noted: 2017-09-11

## 2017-09-14 ENCOUNTER — PATIENT MESSAGE (OUTPATIENT)
Dept: SURGERY | Facility: CLINIC | Age: 62
End: 2017-09-14

## 2017-09-14 NOTE — TELEPHONE ENCOUNTER
From: Radha Rayo  To:  Rangel Lyon MD  Sent: 9/14/2017 5:19 AM CDT  Subject: Visit Follow-up Question    I had my visit with Neuromuscular doc Jeff iVllaseñor on 9-11, he said the muscle biopsy report states falls within normal range with miesha

## 2017-09-26 ENCOUNTER — PRIOR ORIGINAL RECORDS (OUTPATIENT)
Dept: OTHER | Age: 62
End: 2017-09-26

## 2017-09-26 LAB
ALBUMIN: 3.6 G/DL
ALKALINE PHOSPHATATE(ALK PHOS): 84 IU/L
BILIRUBIN TOTAL: 0.7 MG/DL
BUN: 19 MG/DL
CALCIUM: 9.6 MG/DL
CHLORIDE: 106 MEQ/L
CREATININE, SERUM: 0.93 MG/DL
FREE T4: 1.7 MG/DL
GLUCOSE: 91 MG/DL
POTASSIUM, SERUM: 4.7 MEQ/L
PROTEIN, TOTAL: 6.6 G/DL
SGOT (AST): 17 IU/L
SGPT (ALT): 25 IU/L
SODIUM: 143 MEQ/L
THYROID STIMULATING HORMONE: 0.75 MLU/L

## 2017-10-03 RX ORDER — TRAMADOL HYDROCHLORIDE 50 MG/1
TABLET ORAL
Qty: 120 TABLET | Refills: 0 | Status: SHIPPED
Start: 2017-10-03 | End: 2017-12-01

## 2017-10-03 NOTE — TELEPHONE ENCOUNTER
Medication: Tramadol 50mg    Date of last refill: 8/24/2017  [place] Reviewed 3/19/3518    Last office visit: 8/24/20107  Due back to clinic per last office note:  Return in about 2 months (around 10/24/2017).    Date nex

## 2017-10-06 RX ORDER — METHOCARBAMOL 750 MG/1
750 TABLET, FILM COATED ORAL 3 TIMES DAILY PRN
Qty: 90 TABLET | Refills: 0 | Status: SHIPPED | OUTPATIENT
Start: 2017-10-06 | End: 2017-11-05

## 2017-10-06 NOTE — TELEPHONE ENCOUNTER
Medication: Methocarbamol 750mg    Date of last refill: 8/24/2017  [place]    Last office visit: 8/24/2017  Due back to clinic per last office note:  Return in about 2 months (around 10/24/2017).   Date next office vi

## 2017-10-07 ENCOUNTER — HOSPITAL ENCOUNTER (OUTPATIENT)
Dept: MRI IMAGING | Facility: HOSPITAL | Age: 62
Discharge: HOME | End: 2017-10-07
Attending: NURSE PRACTITIONER
Payer: COMMERCIAL

## 2017-10-07 DIAGNOSIS — D35.2 PITUITARY ADENOMA (HCC): ICD-10-CM

## 2017-10-07 PROCEDURE — A9575 INJ GADOTERATE MEGLUMI 0.1ML: HCPCS | Performed by: NURSE PRACTITIONER

## 2017-10-07 PROCEDURE — 70553 MRI BRAIN STEM W/O & W/DYE: CPT | Performed by: NURSE PRACTITIONER

## 2017-10-24 RX ORDER — POTASSIUM CHLORIDE 750 MG/1
TABLET, EXTENDED RELEASE ORAL
Qty: 60 TABLET | Refills: 0 | Status: SHIPPED | OUTPATIENT
Start: 2017-10-24 | End: 2017-12-24

## 2017-10-24 NOTE — TELEPHONE ENCOUNTER
Medication(s) to Refill:   Pending Prescriptions Disp Refills    POTASSIUM CHLORIDE ER 10 MEQ Oral Tab CR [Pharmacy Med Name: POTASSIUM CL MICRO 10MEQ ER TABS] 60 tablet 0     Sig: TAKE 1 TABLET BY MOUTH TWICE DAILY           Last Time Medication was [facility]

## 2017-11-06 RX ORDER — METHOCARBAMOL 750 MG/1
TABLET, FILM COATED ORAL
Qty: 90 TABLET | Refills: 0 | Status: SHIPPED | OUTPATIENT
Start: 2017-11-06 | End: 2017-12-18

## 2017-12-04 RX ORDER — TRAMADOL HYDROCHLORIDE 50 MG/1
TABLET ORAL
Qty: 120 TABLET | Refills: 0 | Status: SHIPPED
Start: 2017-12-04 | End: 2018-01-21

## 2017-12-04 NOTE — TELEPHONE ENCOUNTER
Medication: Tramadol 50 MG    Date of last refill: 10/3/17  [place] Reviewed 22/5/00    Last office visit: 8/24/17  Due back to clinic per last office note:  2 months  Date next office visit scheduled:  Nothing scheduled

## 2017-12-18 RX ORDER — METHOCARBAMOL 750 MG/1
TABLET, FILM COATED ORAL
Qty: 90 TABLET | Refills: 0 | Status: SHIPPED | OUTPATIENT
Start: 2017-12-18 | End: 2018-02-04

## 2017-12-18 NOTE — TELEPHONE ENCOUNTER
Medication: Methocarbamol 750mg    Date of last refill: 11/6/2017  [place] n[place]a    Last office visit: 8/24/2017  Due back to clinic per last office note:  Return in about 2 months (around 10/24/2017).   Date next office vi

## 2017-12-26 RX ORDER — POTASSIUM CHLORIDE 750 MG/1
TABLET, EXTENDED RELEASE ORAL
Qty: 60 TABLET | Refills: 0 | Status: SHIPPED | OUTPATIENT
Start: 2017-12-26 | End: 2018-02-26

## 2017-12-26 RX ORDER — BUMETANIDE 2 MG/1
TABLET ORAL
Qty: 180 TABLET | Refills: 0 | Status: SHIPPED | OUTPATIENT
Start: 2017-12-26 | End: 2018-03-07

## 2018-01-22 RX ORDER — TRAMADOL HYDROCHLORIDE 50 MG/1
TABLET ORAL
Qty: 120 TABLET | Refills: 0 | Status: SHIPPED
Start: 2018-01-22 | End: 2018-03-11

## 2018-01-22 NOTE — TELEPHONE ENCOUNTER
Medication: Tramadol 50mg    Date of last refill: 12/4/2017  [place] reviewed 17/1/0996    Last office visit: 8/24/2017  Due back to clinic per last office note:  Return in about 2 months (around 10/24/2017).   Date next

## 2018-02-05 NOTE — TELEPHONE ENCOUNTER
Medication: methocarbamol 750 MG    Date of last refill: 12/18/17  [place] NA    Last office visit: 8/24/17  Due back to clinic per last office note:  Return in about 2 months (around 10/24/2017)  Date next office visit

## 2018-02-06 RX ORDER — METHOCARBAMOL 750 MG/1
TABLET, FILM COATED ORAL
Qty: 90 TABLET | Refills: 0 | Status: SHIPPED | OUTPATIENT
Start: 2018-02-06 | End: 2018-03-18

## 2018-02-26 ENCOUNTER — PATIENT MESSAGE (OUTPATIENT)
Dept: FAMILY MEDICINE CLINIC | Facility: CLINIC | Age: 63
End: 2018-02-26

## 2018-02-26 RX ORDER — POTASSIUM CHLORIDE 750 MG/1
TABLET, EXTENDED RELEASE ORAL
Qty: 60 TABLET | Refills: 0 | OUTPATIENT
Start: 2018-02-26

## 2018-02-27 RX ORDER — POTASSIUM CHLORIDE 750 MG/1
10 TABLET, EXTENDED RELEASE ORAL 2 TIMES DAILY
Qty: 60 TABLET | Refills: 0 | Status: SHIPPED | OUTPATIENT
Start: 2018-02-27 | End: 2018-03-07

## 2018-02-27 NOTE — TELEPHONE ENCOUNTER
From: Aurelia Farmer  Sent: 2/26/2018 9:29 PM CST  Subject: Medication Renewal Request    Aurelia Farmer would like a refill of the following medications:     POTASSIUM CHLORIDE ER 10 MEQ Oral Tab CR Apple Hurtado MD]    [facility]

## 2018-02-27 NOTE — TELEPHONE ENCOUNTER
LF: 12/26/18 LOV: 11/28/16  No future appointment scheduled. Please approve or deny pending Rx. Thank you!

## 2018-02-27 NOTE — TELEPHONE ENCOUNTER
From: Aurelia Farmer  To: Kellie Flores MD  Sent: 2/26/2018 10:36 PM CST  Subject: Prescription Question    Why Potassium refill denied?

## 2018-03-03 ENCOUNTER — PRIOR ORIGINAL RECORDS (OUTPATIENT)
Dept: OTHER | Age: 63
End: 2018-03-03

## 2018-03-06 ENCOUNTER — MED REC SCAN ONLY (OUTPATIENT)
Dept: FAMILY MEDICINE CLINIC | Facility: CLINIC | Age: 63
End: 2018-03-06

## 2018-03-07 ENCOUNTER — OFFICE VISIT (OUTPATIENT)
Dept: FAMILY MEDICINE CLINIC | Facility: CLINIC | Age: 63
End: 2018-03-07

## 2018-03-07 VITALS
TEMPERATURE: 99 F | SYSTOLIC BLOOD PRESSURE: 118 MMHG | HEIGHT: 66 IN | OXYGEN SATURATION: 99 % | BODY MASS INDEX: 41.78 KG/M2 | DIASTOLIC BLOOD PRESSURE: 76 MMHG | RESPIRATION RATE: 22 BRPM | WEIGHT: 260 LBS | HEART RATE: 82 BPM

## 2018-03-07 DIAGNOSIS — Z12.11 SCREENING FOR COLON CANCER: ICD-10-CM

## 2018-03-07 DIAGNOSIS — Z00.00 ROUTINE GENERAL MEDICAL EXAMINATION AT A HEALTH CARE FACILITY: Primary | ICD-10-CM

## 2018-03-07 PROCEDURE — 99396 PREV VISIT EST AGE 40-64: CPT | Performed by: FAMILY MEDICINE

## 2018-03-07 RX ORDER — BUMETANIDE 2 MG/1
4 TABLET ORAL DAILY
Qty: 180 TABLET | Refills: 4 | Status: SHIPPED | OUTPATIENT
Start: 2018-03-07 | End: 2019-03-10

## 2018-03-07 RX ORDER — METOPROLOL SUCCINATE 50 MG/1
50 TABLET, EXTENDED RELEASE ORAL DAILY
Qty: 90 TABLET | Refills: 3 | Status: SHIPPED | OUTPATIENT
Start: 2018-03-07 | End: 2019-03-21

## 2018-03-07 RX ORDER — POTASSIUM CHLORIDE 750 MG/1
10 TABLET, EXTENDED RELEASE ORAL 2 TIMES DAILY
Qty: 180 TABLET | Refills: 4 | Status: SHIPPED | OUTPATIENT
Start: 2018-03-07 | End: 2020-05-11

## 2018-03-07 RX ORDER — PRAVASTATIN SODIUM 20 MG
20 TABLET ORAL NIGHTLY
Qty: 90 TABLET | Refills: 3 | Status: SHIPPED | OUTPATIENT
Start: 2018-03-07 | End: 2019-03-21

## 2018-03-08 NOTE — PROGRESS NOTES
Chestine Lanes is a 65year old female who presents for a complete physical exam, no gyn. HPI:     Patient presents with:  Medication Request      Patient feels well, dental visit up to date, no hearing problem. Vaccinations: pt refuses.       Exercise Osteoarthrosis, unspecified whether generalized or localized, unspecified site    • PONV (postoperative nausea and vomiting)    • Unspecified disorder of thyroid     Hashimoto's    • Unspecified sleep apnea    • Visual impairment       Past Surgical Histor °C) (Oral)   Resp 22   Ht 66\"   Wt 260 lb   LMP 01/20/2001 (Approximate)   SpO2 99%   BMI 41.97 kg/m²      General: WD/WN in no acute distress. HEENT: PERRLA and EOMI. OP moist no lesions. Neck is supple, with no cervical LAD or thyroid abnormalities. to the plan. The patient is asked to return for CPX in 1 year.

## 2018-03-12 LAB
ALBUMIN: 4.1 G/DL
ALKALINE PHOSPHATATE(ALK PHOS): 86 IU/L
BILIRUBIN TOTAL: 0.7 MG/DL
BUN: 23 MG/DL
CALCIUM: 9.3 MG/DL
CHLORIDE: 100 MEQ/L
CHOLESTEROL, TOTAL: 191 MG/DL
CREATININE, SERUM: 0.9 MG/DL
GLOBULIN: 2.5 G/DL
GLUCOSE: 92 MG/DL
HDL CHOLESTEROL: 51 MG/DL
LDL CHOLESTEROL: 117 MG/DL
POTASSIUM, SERUM: 3.7 MEQ/L
PROTEIN, TOTAL: 6.6 G/DL
SGOT (AST): 19 IU/L
SGPT (ALT): 18 IU/L
SODIUM: 143 MEQ/L
TRIGLYCERIDES: 118 MG/DL

## 2018-03-13 RX ORDER — TRAMADOL HYDROCHLORIDE 50 MG/1
TABLET ORAL
Qty: 120 TABLET | Refills: 0 | Status: SHIPPED
Start: 2018-03-13 | End: 2018-04-02

## 2018-03-14 NOTE — TELEPHONE ENCOUNTER
josetcb to schedule fup appt with Arabella/Dr Stacy Rasmussen for a medication check-future refills, last OV 8/2017

## 2018-03-19 RX ORDER — METHOCARBAMOL 750 MG/1
TABLET, FILM COATED ORAL
Qty: 90 TABLET | Refills: 0 | Status: SHIPPED | OUTPATIENT
Start: 2018-03-19 | End: 2018-04-02

## 2018-04-02 ENCOUNTER — TELEPHONE (OUTPATIENT)
Dept: SURGERY | Facility: CLINIC | Age: 63
End: 2018-04-02

## 2018-04-02 ENCOUNTER — OFFICE VISIT (OUTPATIENT)
Dept: SURGERY | Facility: CLINIC | Age: 63
End: 2018-04-02

## 2018-04-02 ENCOUNTER — APPOINTMENT (OUTPATIENT)
Dept: LAB | Age: 63
End: 2018-04-02
Attending: NURSE PRACTITIONER
Payer: COMMERCIAL

## 2018-04-02 VITALS — HEART RATE: 78 BPM | SYSTOLIC BLOOD PRESSURE: 124 MMHG | DIASTOLIC BLOOD PRESSURE: 70 MMHG

## 2018-04-02 DIAGNOSIS — M54.16 LUMBAR RADICULOPATHY: ICD-10-CM

## 2018-04-02 DIAGNOSIS — Z79.891 LONG TERM CURRENT USE OF OPIATE ANALGESIC: ICD-10-CM

## 2018-04-02 DIAGNOSIS — M47.817 SPONDYLOSIS OF LUMBOSACRAL REGION, UNSPECIFIED SPINAL OSTEOARTHRITIS COMPLICATION STATUS: ICD-10-CM

## 2018-04-02 DIAGNOSIS — M47.816 LUMBAR FACET ARTHROPATHY: ICD-10-CM

## 2018-04-02 DIAGNOSIS — M79.10 MYALGIA: Primary | ICD-10-CM

## 2018-04-02 DIAGNOSIS — M46.1 SACROILIITIS (HCC): ICD-10-CM

## 2018-04-02 PROCEDURE — 80307 DRUG TEST PRSMV CHEM ANLYZR: CPT | Performed by: NURSE PRACTITIONER

## 2018-04-02 PROCEDURE — 99214 OFFICE O/P EST MOD 30 MIN: CPT | Performed by: NURSE PRACTITIONER

## 2018-04-02 RX ORDER — PREGABALIN 25 MG/1
25 CAPSULE ORAL 2 TIMES DAILY
Qty: 60 CAPSULE | Refills: 0 | Status: SHIPPED
Start: 2018-04-02 | End: 2018-05-01 | Stop reason: SINTOL

## 2018-04-02 RX ORDER — TRAMADOL HYDROCHLORIDE 50 MG/1
TABLET ORAL
Qty: 120 TABLET | Refills: 0 | Status: SHIPPED
Start: 2018-04-11 | End: 2018-05-28

## 2018-04-02 RX ORDER — METHOCARBAMOL 750 MG/1
TABLET, FILM COATED ORAL
Qty: 90 TABLET | Refills: 0 | Status: SHIPPED | OUTPATIENT
Start: 2018-04-02 | End: 2018-04-30

## 2018-04-02 NOTE — PROGRESS NOTES
Name: Yuliana Mcgrath   : 1955   DOS: 2018     Pain Clinic Follow Up Visit:   Yuliana Mcgrath is a 65year old female who presents for recheck of medication for pain. She states she is feeling so-so, she is managing.  During the day she takes Ba terribly helpful in the past.     Review of systems:  Musculoskeletal: Myopathy and myalgia. Low back pain. History of left hip replacement. GYN: Hysterectomy years ago, hasn't seen a gynecologist in years.   Neurologic: Denies bowel/bladder incontinence cervical facet joints. Tightness to trapezius muscles bilaterally. Radial pulsation is palpable bilaterally. Halsted maneuver is normal. Spurling sign is negative. Back: Pain with flexion, extension, bilateral leaning and twisting motions.  Pain to palpati medications were discussed with patient today. Patient should contact our office if this medication is not sufficient to help manage pain. We have discussed other therapies to help her manage her pain including accupuncture and psychological services.

## 2018-04-02 NOTE — PATIENT INSTRUCTIONS
Refill policies:    • Allow 2-3 business days for refills; controlled substances may take longer.   • Contact your pharmacy at least 5 days prior to running out of medication and have them send an electronic request or submit request through the [facility] for the entire amount billed. Precertification and Prior Authorizations  If your physician has recommended that you have a procedure or additional testing performed.   [employer]) will contact your insurance carrier to obtain pr

## 2018-04-30 RX ORDER — METHOCARBAMOL 500 MG/1
TABLET, FILM COATED ORAL
Qty: 90 TABLET | Refills: 0 | OUTPATIENT
Start: 2018-05-01

## 2018-04-30 RX ORDER — METHOCARBAMOL 750 MG/1
TABLET, FILM COATED ORAL
Qty: 90 TABLET | Refills: 0 | Status: SHIPPED | OUTPATIENT
Start: 2018-04-30 | End: 2018-07-23

## 2018-05-01 ENCOUNTER — PATIENT MESSAGE (OUTPATIENT)
Dept: SURGERY | Facility: CLINIC | Age: 63
End: 2018-05-01

## 2018-05-01 NOTE — TELEPHONE ENCOUNTER
From: Gregg Hanson  To: MOISE Ocampo  Sent: 5/1/2018 4:40 AM CDT  Subject: Prescription Question    Hi Arabella,  I just wanted to give you an update on the script we tried for Lyrica, per your request. I took it for 2 weeks only. Jose Guadalupe Dickerson I was getting swekristinain

## 2018-05-29 RX ORDER — TRAMADOL HYDROCHLORIDE 50 MG/1
TABLET ORAL
Qty: 120 TABLET | Refills: 0 | Status: SHIPPED
Start: 2018-05-29 | End: 2018-07-08

## 2018-05-29 NOTE — TELEPHONE ENCOUNTER
Medication: TraMADol HCl 50 MG    Date of last refill: 4/11/18    [place]10    Last office visit: 4/2/18  Due back to clinic per last office note:  3 months (around 7/2/2018)  Date next office visit scheduled:  Non she would like to proceed.     Radiology orders and consultations:None     The patient indicates understanding of these issues and agrees to the plan. Return in about 3 months (around 7/2/2018). for medication recheck.  Certainly, we are happy to see her b

## 2018-07-09 RX ORDER — TRAMADOL HYDROCHLORIDE 50 MG/1
TABLET ORAL
Qty: 120 TABLET | Refills: 0 | Status: SHIPPED | OUTPATIENT
Start: 2018-07-09 | End: 2018-08-12

## 2018-07-09 NOTE — TELEPHONE ENCOUNTER
I have authorized the prescription for today, please have patient make an office visit prior to any more prescription refill. We require every 3 month office visit for patients who get controlled/narcotic medications from us.  Last office visit 4/2018

## 2018-07-09 NOTE — TELEPHONE ENCOUNTER
Medication: TRAMADOL HCL 50 MG Oral Tab    Date of last refill: 5/29/18  [place] 7/82/36 ET linda    Last office visit: 4/2/18  Due back to clinic per last office note: Alen Due in about 3 months (around 7/2/2018).  for med her pain including accupuncture and psychological services.  She will consider these options and contact our office if she would like to proceed.     Radiology orders and consultations:None     The patient indicates understanding of these issues and agrees

## 2018-07-23 ENCOUNTER — OFFICE VISIT (OUTPATIENT)
Dept: PAIN CLINIC | Facility: CLINIC | Age: 63
End: 2018-07-23
Payer: COMMERCIAL

## 2018-07-23 VITALS
WEIGHT: 260 LBS | HEART RATE: 60 BPM | HEIGHT: 66 IN | SYSTOLIC BLOOD PRESSURE: 122 MMHG | OXYGEN SATURATION: 97 % | BODY MASS INDEX: 41.78 KG/M2 | DIASTOLIC BLOOD PRESSURE: 76 MMHG

## 2018-07-23 DIAGNOSIS — F11.90 CHRONIC NARCOTIC USE: ICD-10-CM

## 2018-07-23 DIAGNOSIS — M79.10 MYALGIA: ICD-10-CM

## 2018-07-23 DIAGNOSIS — M47.816 LUMBAR FACET ARTHROPATHY: ICD-10-CM

## 2018-07-23 DIAGNOSIS — M54.16 LUMBAR RADICULOPATHY: ICD-10-CM

## 2018-07-23 DIAGNOSIS — M46.1 SACROILIITIS (HCC): Primary | ICD-10-CM

## 2018-07-23 PROCEDURE — 99214 OFFICE O/P EST MOD 30 MIN: CPT | Performed by: NURSE PRACTITIONER

## 2018-07-23 NOTE — PROGRESS NOTES
Name: Theo Leo   : 1955   DOS: 2018     Pain Clinic Follow Up Visit:   Theo Leo is a 65year old female who presents for medication check.  Patient with h/o low back pain with radiation s/p multiple procedures with not much relief mouth every morning. 1/2 tab daily  Disp:  Rfl:    Vitamin D3 (VITAMIN D3) 2000 UNITS Oral Cap Take 2,000 Units by mouth daily. Disp:  Rfl:    BIOTIN OR Take 10,000 mcg by mouth daily. Disp:  Rfl:    aspirin 325 MG Oral Tab Take 325 mg by mouth daily.  Disp

## 2018-07-23 NOTE — PATIENT INSTRUCTIONS
Refill policies:    • Allow 2-3 business days for refills; controlled substances may take longer.   • Contact your pharmacy at least 5 days prior to running out of medication and have them send an electronic request or submit request through the “request re entire amount billed. Precertification and Prior Authorizations: If your physician has recommended that you have a procedure or additional testing performed.   [facility]) will contact your insurance carrier to obtain pre-certi

## 2018-08-02 NOTE — PROGRESS NOTES
Spoke with patient and scheduled injections. Reviewed pre-op instructions. Patient verbalized understanding, no further questions at this time. Pre-op instructions sent via [facility] [employer]. Home with Hospice

## 2018-08-13 RX ORDER — TRAMADOL HYDROCHLORIDE 50 MG/1
TABLET ORAL
Qty: 120 TABLET | Refills: 0 | Status: SHIPPED | OUTPATIENT
Start: 2018-08-13 | End: 2018-09-21

## 2018-08-13 NOTE — TELEPHONE ENCOUNTER
Medication: TRAMADOL HCL 50 MG Oral Tab    Date of last refill: 7/9/18  [place]6/01 ET linda on site      Last office visit: 7/23/18  Due back to clinic per last office note:  Return in about 3 months (around 10/23/2018

## 2018-09-21 RX ORDER — TRAMADOL HYDROCHLORIDE 50 MG/1
TABLET ORAL
Qty: 120 TABLET | Refills: 0 | Status: SHIPPED
Start: 2018-09-21 | End: 2018-10-08

## 2018-09-21 NOTE — TELEPHONE ENCOUNTER
Spoke with MOISE Wolf to inform Rx had been faxed without signature. Ok per Keyur for Rx to be filled.

## 2018-09-21 NOTE — TELEPHONE ENCOUNTER
Medication: TRAMADOL HCL 50 MG     Date of last refill: 8/13/18    [place] 2/49/62    Last office visit: 7/23/18  Due back to clinic per last office note:  3 months (around 10/23/2018)   Date next office visit scheduled:

## 2018-10-08 ENCOUNTER — TELEPHONE (OUTPATIENT)
Dept: PAIN CLINIC | Facility: CLINIC | Age: 63
End: 2018-10-08

## 2018-10-08 ENCOUNTER — OFFICE VISIT (OUTPATIENT)
Dept: PAIN CLINIC | Facility: CLINIC | Age: 63
End: 2018-10-08
Payer: COMMERCIAL

## 2018-10-08 VITALS
DIASTOLIC BLOOD PRESSURE: 78 MMHG | WEIGHT: 260 LBS | HEART RATE: 58 BPM | HEIGHT: 66 IN | OXYGEN SATURATION: 99 % | SYSTOLIC BLOOD PRESSURE: 118 MMHG | BODY MASS INDEX: 41.78 KG/M2

## 2018-10-08 DIAGNOSIS — Z79.891 LONG TERM (CURRENT) USE OF OPIATE ANALGESIC: ICD-10-CM

## 2018-10-08 DIAGNOSIS — M46.1 BILATERAL SACROILIITIS (HCC): Primary | ICD-10-CM

## 2018-10-08 DIAGNOSIS — M54.16 LUMBAR RADICULOPATHY: ICD-10-CM

## 2018-10-08 DIAGNOSIS — M46.1 SACROILIITIS (HCC): Primary | ICD-10-CM

## 2018-10-08 PROCEDURE — 99214 OFFICE O/P EST MOD 30 MIN: CPT | Performed by: NURSE PRACTITIONER

## 2018-10-08 RX ORDER — TRAMADOL HYDROCHLORIDE 50 MG/1
TABLET ORAL
Qty: 240 TABLET | Refills: 0 | Status: SHIPPED
Start: 2018-10-08 | End: 2018-12-09

## 2018-10-08 RX ORDER — BACLOFEN 10 MG/1
10 TABLET ORAL EVERY 8 HOURS PRN
Qty: 90 TABLET | Refills: 0 | Status: SHIPPED | OUTPATIENT
Start: 2018-10-08 | End: 2018-11-05

## 2018-10-08 RX ORDER — TRAMADOL HYDROCHLORIDE 50 MG/1
100 TABLET ORAL EVERY 6 HOURS PRN
Qty: 240 TABLET | Refills: 0 | Status: SHIPPED
Start: 2018-10-08 | End: 2018-10-08 | Stop reason: CLARIF

## 2018-10-08 NOTE — TELEPHONE ENCOUNTER
Pt seen in [address] today by Arabella and recommended for RESHMA and BEI with Dr. Jerris Cranker (X 1 each). Please begin PA process for procedure(s).      Laterality: Bilateral  Level(s): SI joint    Laterality: LESI  Level(s): n/a    Pt informed callback will be given when PA

## 2018-10-08 NOTE — PATIENT INSTRUCTIONS
Refill policies:    • Allow 2-3 business days for refills; controlled substances may take longer.   • Contact your pharmacy at least 5 days prior to running out of medication and have them send an electronic request or submit request through the “request re entire amount billed. Precertification and Prior Authorizations: If your physician has recommended that you have a procedure or additional testing performed.   [facility]) will contact your insurance carrier to obtain pre-certi

## 2018-10-08 NOTE — TELEPHONE ENCOUNTER
Medical clearance requested from Dr. Coreen Long for patient to hold  for 7 days for TBD (2) procedures. Awaiting response.

## 2018-10-08 NOTE — PROGRESS NOTES
Rx faxed to [facility] at [phone] with verification received of successful transmission.

## 2018-10-11 ENCOUNTER — PRIOR ORIGINAL RECORDS (OUTPATIENT)
Dept: OTHER | Age: 63
End: 2018-10-11

## 2018-10-11 NOTE — TELEPHONE ENCOUNTER
Started PA on phone with [facility], [address]. Started PA for codes 45450 and 12181.   Ref # for [id number] DILLON is [id number]  Ref # for [id number] SIJ is #[id number]    Faxed clinical notes to [phone]   Fax confirmed

## 2018-10-12 NOTE — TELEPHONE ENCOUNTER
Response received to Medical Clearance Letter from Dr Jonathan Nicole for upcoming Dr Flores Lewis procedures, endorsed to Pain Nurse Medical Clearance folder in y [address] Kira Diaz,[phone]

## 2018-10-16 NOTE — TELEPHONE ENCOUNTER
Medical Clearanace approved by Dr. Truman Zaragoza to hold Aspirin 325 mg for 7 days prior to each injection.     Letter sent to scan

## 2018-10-22 NOTE — TELEPHONE ENCOUNTER
Both codes [id number] (LESI) & 20351 (SIJ) approved however they are date specific once we set the patient up with appt I will have to call [facility] with the dates

## 2018-10-28 ENCOUNTER — MED REC SCAN ONLY (OUTPATIENT)
Dept: FAMILY MEDICINE CLINIC | Facility: CLINIC | Age: 63
End: 2018-10-28

## 2018-11-01 ENCOUNTER — TELEPHONE (OUTPATIENT)
Dept: FAMILY MEDICINE CLINIC | Facility: CLINIC | Age: 63
End: 2018-11-01

## 2018-11-01 PROBLEM — C44.619 BASAL CELL CARCINOMA (BCC) OF LEFT FOREARM: Status: ACTIVE | Noted: 2018-11-01

## 2018-11-03 ENCOUNTER — PRIOR ORIGINAL RECORDS (OUTPATIENT)
Dept: OTHER | Age: 63
End: 2018-11-03

## 2018-11-05 ENCOUNTER — PATIENT MESSAGE (OUTPATIENT)
Dept: PAIN CLINIC | Facility: CLINIC | Age: 63
End: 2018-11-05

## 2018-11-05 RX ORDER — BACLOFEN 10 MG/1
10 TABLET ORAL EVERY 6 HOURS PRN
Qty: 120 TABLET | Refills: 0 | Status: SHIPPED | OUTPATIENT
Start: 2018-11-05 | End: 2018-12-02

## 2018-11-05 NOTE — TELEPHONE ENCOUNTER
From: Bartolo Lew  To: MOISE Youngblood  Sent: 11/5/2018 5:18 AM CST  Subject: Prescription Question    Hi Arabella,  I wanted to touch base with you with regards to the new script you gave me Baclofem 10mg every 8 hours.  This medication gave a tiny bit of

## 2018-11-06 ENCOUNTER — MYAURORA ACCOUNT LINK (OUTPATIENT)
Dept: OTHER | Age: 63
End: 2018-11-06

## 2018-11-06 ENCOUNTER — PRIOR ORIGINAL RECORDS (OUTPATIENT)
Dept: OTHER | Age: 63
End: 2018-11-06

## 2018-11-09 NOTE — TELEPHONE ENCOUNTER
Spoke with patient, reports she did not received previous messages. Phone number verified as correct. Spoke with patient and scheduled injections. Reviewed pre-op instructions. Patient verbalized understanding, no further questions at this time.  Pre-op i

## 2018-11-13 LAB
ALBUMIN: 4.1 G/DL
ALKALINE PHOSPHATATE(ALK PHOS): 76 IU/L
BILIRUBIN TOTAL: 0.7 MG/DL
BUN: 24 MG/DL
CALCIUM: 9.4 MG/DL
CHLORIDE: 103 MEQ/L
CREATININE, SERUM: 1 MG/DL
FREE T4: 1.4 MG/DL
GLOBULIN: 2.3 G/DL
GLUCOSE: 95 MG/DL
POTASSIUM, SERUM: 4.2 MEQ/L
PROTEIN, TOTAL: 6.4 G/DL
SGOT (AST): 21 IU/L
SGPT (ALT): 15 IU/L
SODIUM: 145 MEQ/L
THYROID STIMULATING HORMONE: 4.83 MLU/L

## 2018-11-30 ENCOUNTER — TELEPHONE (OUTPATIENT)
Dept: SURGERY | Facility: CLINIC | Age: 63
End: 2018-11-30

## 2018-11-30 NOTE — TELEPHONE ENCOUNTER
Left message for patient, confirmed procedure date of 12/6/18 and to be checked in at outpatient registration at 2:45 pm. Patient called pre-procedure line and understood instructions/Patient instructed to call pre-procedure line before procedure at [phone]

## 2018-12-03 RX ORDER — BACLOFEN 10 MG/1
TABLET ORAL
Qty: 120 TABLET | Refills: 0 | Status: SHIPPED | OUTPATIENT
Start: 2018-12-04 | End: 2018-12-30

## 2018-12-03 NOTE — TELEPHONE ENCOUNTER
Medication: baclofen 10 MG Oral Tab    Date of last refill: 11/5/18  [place] 54/4/69    Last office visit: 10/8/18  Due back to clinic per last office note:  Return in about 6 weeks (around 11/19/2018).   Date next office manage pain.      Radiology orders and consultations:None     The patient indicates understanding of these issues and agrees to the plan. Return in about 6 weeks (around 11/19/2018).      MOISE Campos, 10/8/2018, 9:07 AM

## 2018-12-06 ENCOUNTER — APPOINTMENT (OUTPATIENT)
Dept: GENERAL RADIOLOGY | Facility: HOSPITAL | Age: 63
End: 2018-12-06
Attending: ANESTHESIOLOGY
Payer: COMMERCIAL

## 2018-12-06 ENCOUNTER — HOSPITAL ENCOUNTER (OUTPATIENT)
Facility: HOSPITAL | Age: 63
Setting detail: HOSPITAL OUTPATIENT SURGERY
Discharge: HOME | End: 2018-12-06
Attending: ANESTHESIOLOGY | Admitting: ANESTHESIOLOGY
Payer: COMMERCIAL

## 2018-12-06 VITALS
SYSTOLIC BLOOD PRESSURE: 132 MMHG | HEART RATE: 61 BPM | OXYGEN SATURATION: 100 % | TEMPERATURE: 97 F | DIASTOLIC BLOOD PRESSURE: 70 MMHG | RESPIRATION RATE: 12 BRPM

## 2018-12-06 PROCEDURE — B01B1ZZ FLUOROSCOPY OF SPINAL CORD USING LOW OSMOLAR CONTRAST: ICD-10-PCS | Performed by: ANESTHESIOLOGY

## 2018-12-06 PROCEDURE — 3E0S33Z INTRODUCTION OF ANTI-INFLAMMATORY INTO EPIDURAL SPACE, PERCUTANEOUS APPROACH: ICD-10-PCS | Performed by: ANESTHESIOLOGY

## 2018-12-06 PROCEDURE — 99152 MOD SED SAME PHYS/QHP 5/>YRS: CPT | Performed by: ANESTHESIOLOGY

## 2018-12-06 RX ORDER — ONDANSETRON 2 MG/ML
4 INJECTION INTRAMUSCULAR; INTRAVENOUS ONCE AS NEEDED
Status: DISCONTINUED | OUTPATIENT
Start: 2018-12-06 | End: 2018-12-06

## 2018-12-06 RX ORDER — DEXAMETHASONE SODIUM PHOSPHATE 10 MG/ML
INJECTION, SOLUTION INTRAMUSCULAR; INTRAVENOUS AS NEEDED
Status: DISCONTINUED | OUTPATIENT
Start: 2018-12-06 | End: 2018-12-06 | Stop reason: HOSPADM

## 2018-12-06 RX ORDER — LIDOCAINE HYDROCHLORIDE 10 MG/ML
INJECTION, SOLUTION EPIDURAL; INFILTRATION; INTRACAUDAL; PERINEURAL AS NEEDED
Status: DISCONTINUED | OUTPATIENT
Start: 2018-12-06 | End: 2018-12-06 | Stop reason: HOSPADM

## 2018-12-06 RX ORDER — DIPHENHYDRAMINE HYDROCHLORIDE 50 MG/ML
50 INJECTION INTRAMUSCULAR; INTRAVENOUS ONCE AS NEEDED
Status: DISCONTINUED | OUTPATIENT
Start: 2018-12-06 | End: 2018-12-06

## 2018-12-06 RX ORDER — SODIUM CHLORIDE, SODIUM LACTATE, POTASSIUM CHLORIDE, CALCIUM CHLORIDE 600; 310; 30; 20 MG/100ML; MG/100ML; MG/100ML; MG/100ML
100 INJECTION, SOLUTION INTRAVENOUS CONTINUOUS
Status: DISCONTINUED | OUTPATIENT
Start: 2018-12-06 | End: 2018-12-06

## 2018-12-06 RX ORDER — MIDAZOLAM HYDROCHLORIDE 1 MG/ML
INJECTION INTRAMUSCULAR; INTRAVENOUS AS NEEDED
Status: DISCONTINUED | OUTPATIENT
Start: 2018-12-06 | End: 2018-12-06 | Stop reason: HOSPADM

## 2018-12-06 RX ORDER — ONDANSETRON 2 MG/ML
4 INJECTION INTRAMUSCULAR; INTRAVENOUS ONCE AS NEEDED
Status: DISCONTINUED | OUTPATIENT
Start: 2018-12-06 | End: 2018-12-06 | Stop reason: HOSPADM

## 2018-12-06 NOTE — H&P
History & Physical Examination    Patient Name: Carlitos Corral  MRN: [id number]  [facility]: [id number]  YOB: 1955    Pre-Operative Diagnosis:  Lumbar radiculopathy [M54.16]    Present Illness: 59-year-old female patient with chronic low back pain f ITCHING  Other                       Comment:Patient experiences a yeast infection with any             antibiotic administration please treat for yeast             infection whenever startingAn antibiotic    Past Medical History:   Diagnosi Alcohol/week: 0.0 oz      Comment: rare      SYSTEM Check if Review is Normal Check if Physical Exam is Normal If not normal, please explain:   HEENT [x ] [x ]    NECK & BACK [x ] [x ]    HEART [x ] [x ]    LUNGS [x ] [x ]    ABDOMEN [x ] [x ]    Jason Clark

## 2018-12-07 ENCOUNTER — TELEPHONE (OUTPATIENT)
Dept: SURGERY | Facility: CLINIC | Age: 63
End: 2018-12-07

## 2018-12-07 ENCOUNTER — MED REC SCAN ONLY (OUTPATIENT)
Dept: FAMILY MEDICINE CLINIC | Facility: CLINIC | Age: 63
End: 2018-12-07

## 2018-12-07 NOTE — TELEPHONE ENCOUNTER
Left message for patient, confirmed procedure date of 12/13 and to be checked in at outpatient registration at 2:30 pm. Patient instructed to call pre-procedure line before procedure at [phone].  Patient instructed to call office if there are additiona

## 2018-12-07 NOTE — OPERATIVE REPORT
[facility]  Operative Report  2018     Toritonja Key Patient Status:  Hospital Outpatient Surgery    1955 MRN [id number]   [facility] No att[facility] providers found   Hosp Day # 0 PCP Laura Deshpande with AP and lateral view under fluoroscopy. Omnipaque 180 was injected in 1 mL volume. A good epidurogram was obtained. Thereafter, dexamethasone 10 mg with normal saline of 5 mL in total volume of 6 mL was injected through the Tuohy needle.   The needle

## 2018-12-10 RX ORDER — TRAMADOL HYDROCHLORIDE 50 MG/1
TABLET ORAL
Qty: 240 TABLET | Refills: 0 | Status: SHIPPED | OUTPATIENT
Start: 2018-12-10 | End: 2019-01-14

## 2018-12-10 NOTE — TELEPHONE ENCOUNTER
Medication: TraMADol HCl 50 MG Oral Tab    Date of last refill: 10/8/18  [place] 13/0/46 ET Tonny    Last office visit: 10/8/18  Due back to clinic per last office note:  Return in about 6 weeks (around 11/19/2018).      Da Patient should contact our office if this medication is not sufficient to help manage pain.      Radiology orders and consultations:None     The patient indicates understanding of these issues and agrees to the plan.   Return in about 6 weeks (around 11/19/

## 2018-12-12 ENCOUNTER — TELEPHONE (OUTPATIENT)
Dept: SURGERY | Facility: CLINIC | Age: 63
End: 2018-12-12

## 2018-12-12 NOTE — TELEPHONE ENCOUNTER
Patient wants to make sure the procedure was scheduled correctly, wonders if the injections should not have been two weeks apart instead. Please call.

## 2018-12-13 ENCOUNTER — TELEPHONE (OUTPATIENT)
Dept: PAIN CLINIC | Facility: CLINIC | Age: 63
End: 2018-12-13

## 2018-12-13 ENCOUNTER — HOSPITAL ENCOUNTER (OUTPATIENT)
Facility: HOSPITAL | Age: 63
Setting detail: HOSPITAL OUTPATIENT SURGERY
Discharge: HOME | End: 2018-12-13
Attending: ANESTHESIOLOGY | Admitting: ANESTHESIOLOGY
Payer: COMMERCIAL

## 2018-12-13 ENCOUNTER — APPOINTMENT (OUTPATIENT)
Dept: GENERAL RADIOLOGY | Facility: HOSPITAL | Age: 63
End: 2018-12-13
Attending: ANESTHESIOLOGY
Payer: COMMERCIAL

## 2018-12-13 ENCOUNTER — TELEPHONE (OUTPATIENT)
Dept: SURGERY | Facility: CLINIC | Age: 63
End: 2018-12-13

## 2018-12-13 VITALS
OXYGEN SATURATION: 100 % | RESPIRATION RATE: 17 BRPM | DIASTOLIC BLOOD PRESSURE: 54 MMHG | HEART RATE: 58 BPM | TEMPERATURE: 98 F | SYSTOLIC BLOOD PRESSURE: 149 MMHG

## 2018-12-13 DIAGNOSIS — M47.817 SPONDYLOSIS OF LUMBOSACRAL REGION, UNSPECIFIED SPINAL OSTEOARTHRITIS COMPLICATION STATUS: ICD-10-CM

## 2018-12-13 DIAGNOSIS — M46.1 BILATERAL SACROILIITIS (HCC): Primary | ICD-10-CM

## 2018-12-13 RX ORDER — SODIUM CHLORIDE, SODIUM LACTATE, POTASSIUM CHLORIDE, CALCIUM CHLORIDE 600; 310; 30; 20 MG/100ML; MG/100ML; MG/100ML; MG/100ML
100 INJECTION, SOLUTION INTRAVENOUS CONTINUOUS
Status: DISCONTINUED | OUTPATIENT
Start: 2018-12-13 | End: 2018-12-13

## 2018-12-13 RX ORDER — ONDANSETRON 2 MG/ML
4 INJECTION INTRAMUSCULAR; INTRAVENOUS ONCE AS NEEDED
Status: DISCONTINUED | OUTPATIENT
Start: 2018-12-13 | End: 2018-12-13

## 2018-12-13 RX ORDER — TRAMADOL HYDROCHLORIDE 100 MG/1
100 TABLET, EXTENDED RELEASE ORAL DAILY
Qty: 30 TABLET | Refills: 0 | Status: SHIPPED
Start: 2018-12-13 | End: 2019-01-17

## 2018-12-13 NOTE — TELEPHONE ENCOUNTER
Rx faxed to pharmacy. Confirmation received. Spoke with patient. She stated she saw a neurologist at [facility], and a muscle biopsy was completed and she was negative for myasthenia gravis and mitochondrial disease.     Last Aspirin dose was 12/11/18

## 2018-12-13 NOTE — TELEPHONE ENCOUNTER
Patient's procedure was cancelled by Dr. Deanne Gutiérrez due to aspirin use on Tuesday. Will you please let her know that I have reviewed her case with Dr. Deanne Gutiérrez and he has reviewed her lumbar MRI. We recommend adding Tramadol ER 100mg one tablet daily.  She can st

## 2018-12-13 NOTE — H&P
History & Physical Examination    Patient Name: Mikayla Rincon  MRN: [id number]  CSN: [id number]  YOB: 1955    Pre-Operative Diagnosis:  [facility] [M46.1]    Present Illness: A 59year old female with low back pain is here INTERLAMINAR EPIDURAL INJECTION N/A 12/6/2018    Performed by Stuart Tolbert MD at [facility]   • LUMBAR INTERLAMINAR EPIDURAL INJECTION N/A 6/5/2017    Performed by Stuart Tolbert MD at [facility] MAIN OR   • MUSCLE BIOPSY Right 1/27/2016    Performed by Sc

## 2018-12-14 NOTE — TELEPHONE ENCOUNTER
Patient stated she takes Aspirin 325 mg for Afib recommended by her cardiologist - Medical clearance approved by Dr Truman Zaragoza to hold Aspirin 325 mg for 7 days prior to each injection per TE 10/8/18    Spoke with patient and scheduled injection.  Reviewed pre ? Please bring your Insurance Card, Photo ID, List of Current Medications and Referral (if applicable) to your appointment. Check in at [facility] ([address] [place], Branden Ruffin) outpatient registration in the [employer].   ? Please note-No p Please contact your insurance carrier to determine what your financial  responsibility will be for the procedure(s).     Cancellation/Rescheduling Appointment:   In the event you need to cancel or reschedule your appointment, you must notify the office 24

## 2018-12-14 NOTE — TELEPHONE ENCOUNTER
Given that MILD procedure is not yet available it is Okay to reschedule bilateral sacroiliac joint injection. I have placed the case. Please give her instructions regarding aspirin prior to the injections.  Please have her follow up in our office two weeks

## 2018-12-21 ENCOUNTER — PATIENT MESSAGE (OUTPATIENT)
Dept: SURGERY | Facility: CLINIC | Age: 63
End: 2018-12-21

## 2018-12-21 NOTE — TELEPHONE ENCOUNTER
From: Matt Rodriguez  To: Francisco J Merchant MD  Sent: 12/21/2018 6:01 AM CST  Subject: Visit Follow-up Question    Good Morning  Dr Terrel Nyhan had offered to have one of his colleagues at [address] [place] [address] look at my back MRI because the problems I was having he co

## 2018-12-21 NOTE — TELEPHONE ENCOUNTER
Per LOV 6/20/17 with CHUY Geiger: \"   ASSESSMENT and PLAN:  1. Low back pain. 2.  Left leg radiculopathy. 3.  Bilateral muscle tightness of arms/legs. 4.  Lumbar spondylosis.     Reviewed imaging with the patient.   She has lumbar degenerative soto

## 2018-12-21 NOTE — PROGRESS NOTES
Called pt. She is having worsening pain and is more open to considering surgery. She was told in the past that she may need a large surgery to correct her scoliosis and would need to [facility] at a [facility] setting for this.   Since she has not been s

## 2018-12-31 RX ORDER — BACLOFEN 10 MG/1
TABLET ORAL
Qty: 120 TABLET | Refills: 0 | Status: SHIPPED | OUTPATIENT
Start: 2018-12-31 | End: 2019-01-29

## 2019-01-02 ENCOUNTER — TELEPHONE (OUTPATIENT)
Dept: SURGERY | Facility: CLINIC | Age: 64
End: 2019-01-02

## 2019-01-02 NOTE — TELEPHONE ENCOUNTER
Left message for patient, confirmed procedure date of 1/8/19 with Dr Burke Del Rosario and to be checked in at outpatient registration at 10:30 am. Patient instructed to call pre-procedure line before procedure at [phone].  Patient instructed to call office if ther

## 2019-01-08 ENCOUNTER — APPOINTMENT (OUTPATIENT)
Dept: GENERAL RADIOLOGY | Facility: HOSPITAL | Age: 64
End: 2019-01-08
Attending: ANESTHESIOLOGY
Payer: COMMERCIAL

## 2019-01-08 ENCOUNTER — HOSPITAL ENCOUNTER (OUTPATIENT)
Facility: HOSPITAL | Age: 64
Setting detail: HOSPITAL OUTPATIENT SURGERY
Discharge: HOME | End: 2019-01-08
Attending: ANESTHESIOLOGY | Admitting: ANESTHESIOLOGY
Payer: COMMERCIAL

## 2019-01-08 VITALS
HEART RATE: 49 BPM | SYSTOLIC BLOOD PRESSURE: 129 MMHG | TEMPERATURE: 97 F | OXYGEN SATURATION: 100 % | RESPIRATION RATE: 18 BRPM | DIASTOLIC BLOOD PRESSURE: 62 MMHG

## 2019-01-08 DIAGNOSIS — M47.817 SPONDYLOSIS OF LUMBOSACRAL REGION, UNSPECIFIED SPINAL OSTEOARTHRITIS COMPLICATION STATUS: ICD-10-CM

## 2019-01-08 DIAGNOSIS — M46.1 BILATERAL SACROILIITIS (HCC): ICD-10-CM

## 2019-01-08 PROCEDURE — 3E0U3KZ INTRODUCTION OF OTHER DIAGNOSTIC SUBSTANCE INTO JOINTS, PERCUTANEOUS APPROACH: ICD-10-PCS | Performed by: ANESTHESIOLOGY

## 2019-01-08 PROCEDURE — 3E0U3BZ INTRODUCTION OF ANESTHETIC AGENT INTO JOINTS, PERCUTANEOUS APPROACH: ICD-10-PCS | Performed by: ANESTHESIOLOGY

## 2019-01-08 PROCEDURE — 99152 MOD SED SAME PHYS/QHP 5/>YRS: CPT | Performed by: ANESTHESIOLOGY

## 2019-01-08 PROCEDURE — 3E0U33Z INTRODUCTION OF ANTI-INFLAMMATORY INTO JOINTS, PERCUTANEOUS APPROACH: ICD-10-PCS | Performed by: ANESTHESIOLOGY

## 2019-01-08 RX ORDER — ONDANSETRON 2 MG/ML
4 INJECTION INTRAMUSCULAR; INTRAVENOUS ONCE AS NEEDED
Status: CANCELLED | OUTPATIENT
Start: 2019-01-08 | End: 2019-01-08

## 2019-01-08 RX ORDER — SODIUM CHLORIDE, SODIUM LACTATE, POTASSIUM CHLORIDE, CALCIUM CHLORIDE 600; 310; 30; 20 MG/100ML; MG/100ML; MG/100ML; MG/100ML
100 INJECTION, SOLUTION INTRAVENOUS CONTINUOUS
Status: DISCONTINUED | OUTPATIENT
Start: 2019-01-08 | End: 2019-01-08

## 2019-01-08 RX ORDER — DIPHENHYDRAMINE HYDROCHLORIDE 50 MG/ML
50 INJECTION INTRAMUSCULAR; INTRAVENOUS ONCE AS NEEDED
Status: CANCELLED | OUTPATIENT
Start: 2019-01-08 | End: 2019-01-08

## 2019-01-08 RX ORDER — MIDAZOLAM HYDROCHLORIDE 1 MG/ML
INJECTION INTRAMUSCULAR; INTRAVENOUS AS NEEDED
Status: DISCONTINUED | OUTPATIENT
Start: 2019-01-08 | End: 2019-01-08 | Stop reason: HOSPADM

## 2019-01-08 RX ORDER — LIDOCAINE HYDROCHLORIDE 10 MG/ML
INJECTION, SOLUTION EPIDURAL; INFILTRATION; INTRACAUDAL; PERINEURAL AS NEEDED
Status: DISCONTINUED | OUTPATIENT
Start: 2019-01-08 | End: 2019-01-08 | Stop reason: HOSPADM

## 2019-01-08 RX ORDER — ONDANSETRON 2 MG/ML
4 INJECTION INTRAMUSCULAR; INTRAVENOUS ONCE AS NEEDED
Status: DISCONTINUED | OUTPATIENT
Start: 2019-01-08 | End: 2019-01-08 | Stop reason: HOSPADM

## 2019-01-08 RX ORDER — METHYLPREDNISOLONE ACETATE 40 MG/ML
INJECTION, SUSPENSION INTRA-ARTICULAR; INTRALESIONAL; INTRAMUSCULAR; SOFT TISSUE AS NEEDED
Status: DISCONTINUED | OUTPATIENT
Start: 2019-01-08 | End: 2019-01-08 | Stop reason: HOSPADM

## 2019-01-08 NOTE — H&P
History & Physical Examination    Patient Name: Branden Bray  MRN: [id number]  [facility]: [id number]  YOB: 1955    Pre-Operative Diagnosis:  [facility] [M46.1]  Spondylosis of lumbosacral region, unspecified spinal osteoarthriti blood shot eyes  Latex                   ITCHING  Other                       Comment:Patient experiences a yeast infection with any             antibiotic administration please treat for yeast             infection whenever startingAn antibiotic    Past M 32.00        Pack years: 8      Smokeless tobacco: Never Used      Tobacco comment: 1/2 pack daily    Alcohol use: No      Alcohol/week: 0.0 oz      Comment: rare      SYSTEM Check if Review is Normal Check if Physical Exam is Normal If not normal, please

## 2019-01-10 NOTE — OPERATIVE REPORT
[facility]  Operative Report  2019     Lauryn De Patient Status:  Hospital Outpatient Surgery    1955 MRN [id number]   [facility] No att. providers found   Hosp Day # 0 PCP Emelyn Silva guidance, the anterior and posterior aspects of the sacroiliac joint were overlapped. A 22-gauge, Quincke spinal needle was advanced into the middle portion of the first sacroiliac joint.  After the needle  positions were confirmed by AP and lateral views o

## 2019-01-14 ENCOUNTER — TELEPHONE (OUTPATIENT)
Dept: SURGERY | Facility: CLINIC | Age: 64
End: 2019-01-14

## 2019-01-14 NOTE — TELEPHONE ENCOUNTER
Medication: TraMADol HCl 50 MG Oral Tab    Date of last refill: 12/10/18  [place] 04/42/50 ET linda    Last office visit: 10/8/18  Due back to clinic per last office note:  Return in about 6 weeks (around 11/19/2018).   [place] office if this medication is not sufficient to help manage pain.      Radiology orders and consultations:None     The patient indicates understanding of these issues and agrees to the plan. Return in about 6 weeks (around 11/19/2018).      MOISE Arias

## 2019-01-15 RX ORDER — TRAMADOL HYDROCHLORIDE 50 MG/1
TABLET ORAL
Qty: 240 TABLET | Refills: 0 | Status: SHIPPED
Start: 2019-01-15 | End: 2019-05-20

## 2019-01-15 NOTE — TELEPHONE ENCOUNTER
I have authorized the prescription for today, please have patient make an office visit prior to any more prescription refill. We require every 3 month office visit for patients who get controlled/narcotic medications from us.  Last office visit 10/2018

## 2019-01-16 NOTE — TELEPHONE ENCOUNTER
Denise Conteh, pharmacist, called to confirm patient should receive Tramadol 50mg as she is also on Tramadol ER. Confirmed with Kannan Hester, ok for patient to fill both as one is long-acting, one is short-acting. Denise Conteh informed, verbalized understanding.

## 2019-01-17 DIAGNOSIS — M54.16 LUMBAR RADICULOPATHY: Primary | ICD-10-CM

## 2019-01-17 DIAGNOSIS — M46.1 SACROILIITIS (HCC): ICD-10-CM

## 2019-01-17 RX ORDER — TRAMADOL HYDROCHLORIDE 100 MG/1
100 TABLET, EXTENDED RELEASE ORAL DAILY
Qty: 30 TABLET | Refills: 0 | Status: SHIPPED | OUTPATIENT
Start: 2019-01-19 | End: 2019-02-17

## 2019-01-17 NOTE — TELEPHONE ENCOUNTER
Medication: TraMADol HCl  MG Oral Tablet 24 Hr    Date of last refill: 12/21/18  [place] 64/50/18    Last office visit: 10/8/18  Due back to clinic per last office note:  6 weeks   Date next office visit scheduled:    Radiology orders and consultations:None     The patient indicates understanding of these issues and agrees to the plan. Return in about 6 weeks (around 11/19/2018).      MOISE Campos, 10/8/2018, 9:07 AM

## 2019-01-23 ENCOUNTER — OFFICE VISIT (OUTPATIENT)
Dept: PAIN CLINIC | Facility: CLINIC | Age: 64
End: 2019-01-23
Payer: COMMERCIAL

## 2019-01-23 ENCOUNTER — TELEPHONE (OUTPATIENT)
Dept: PAIN CLINIC | Facility: CLINIC | Age: 64
End: 2019-01-23

## 2019-01-23 VITALS
WEIGHT: 260 LBS | HEIGHT: 66 IN | DIASTOLIC BLOOD PRESSURE: 82 MMHG | SYSTOLIC BLOOD PRESSURE: 130 MMHG | BODY MASS INDEX: 41.78 KG/M2 | HEART RATE: 61 BPM | OXYGEN SATURATION: 98 %

## 2019-01-23 DIAGNOSIS — M54.16 LUMBAR RADICULITIS: Primary | ICD-10-CM

## 2019-01-23 PROCEDURE — 99214 OFFICE O/P EST MOD 30 MIN: CPT | Performed by: ANESTHESIOLOGY

## 2019-01-23 RX ORDER — NAPROXEN 500 MG/1
500 TABLET ORAL EVERY 12 HOURS
Qty: 60 TABLET | Refills: 0 | Status: SHIPPED | OUTPATIENT
Start: 2019-01-23 | End: 2019-02-17

## 2019-01-23 NOTE — PATIENT INSTRUCTIONS
Refill policies:    • Allow 2-3 business days for refills; controlled substances may take longer.   • Contact your pharmacy at least 5 days prior to running out of medication and have them send an electronic request or submit request through the “request re entire amount billed. Precertification and Prior Authorizations: If your physician has recommended that you have a procedure or additional testing performed.   [facility]) will contact your insurance carrier to obtain pre-certi

## 2019-01-23 NOTE — TELEPHONE ENCOUNTER
Medical clearance needed- no    **2 weeks apart**    Pt seen in OV today by Dr. Nathan Paz and recommended for TLESIs (X 2). Please begin PA process for procedure(s).      Laterality: left  Level(s): L3-5    Pt informed callback will be given when PA feedback re

## 2019-01-28 NOTE — TELEPHONE ENCOUNTER
[facility] spoke with  Kristofer VERA for first [facility] [address]  Faxed clinical notes to [phone]  Case number [id number]

## 2019-01-28 NOTE — PROGRESS NOTES
Name: Irina Murillo   : 1955   DOS: 2019     Pain Clinic Follow Up Visit:   Irina Murillo is a 59year old female who presents for recheck of her chronic low back pain.   She is status post bilateral sacroiliac joint and lumbar epidural ara total) by mouth 2 (two) times daily. Disp: 180 tablet Rfl: 4   Metoprolol Succinate ER 50 MG Oral Tablet 24 Hr Take 1 tablet (50 mg total) by mouth daily. Disp: 90 tablet Rfl: 3   bumetanide 2 MG Oral Tab Take 2 tablets (4 mg total) by mouth daily.  Disp: 1 patient indicates understanding of these issues and agrees to the plan. No Follow-up on file.     Gordo Pro MD, 1/27/2019, 6:54 PM

## 2019-01-29 DIAGNOSIS — M47.816 LUMBAR SPONDYLOSIS: Primary | ICD-10-CM

## 2019-01-29 DIAGNOSIS — M54.16 LUMBAR RADICULITIS: ICD-10-CM

## 2019-01-29 RX ORDER — BACLOFEN 10 MG/1
TABLET ORAL
Qty: 120 TABLET | Refills: 0 | Status: SHIPPED | OUTPATIENT
Start: 2019-01-29 | End: 2019-03-10

## 2019-02-07 NOTE — TELEPHONE ENCOUNTER
Spoke with Rosita Lezama from [facility] she states PA usually takes 5-7 buesiness days.  She sent case to urgent as it has now beeen over the turn around days

## 2019-02-11 NOTE — TELEPHONE ENCOUNTER
Spoke with Trey Stover from [facility] she states the SIJ done 1/8/19 was approved. Called patient to let her know.  Also let her know that the TLESI is still pending for authorization and that they have send it to the nurse for faster response I will call [facility] before

## 2019-02-13 NOTE — TELEPHONE ENCOUNTER
[facility] [id number] is no approved   Auth number [id number] 1/28/19-2/28/19

## 2019-02-15 NOTE — TELEPHONE ENCOUNTER
Called patient and scheduled for:    Appointment Date:  3/5/19  Procedure Time:  330 pm  Check-in Time:  230pm    Pre-procedure instructions including fasting, med holds,  reviewed with patient. Patient verbalized understanding.

## 2019-02-17 DIAGNOSIS — M46.1 SACROILIITIS (HCC): ICD-10-CM

## 2019-02-17 DIAGNOSIS — M54.16 LUMBAR RADICULOPATHY: ICD-10-CM

## 2019-02-18 RX ORDER — NAPROXEN 500 MG/1
TABLET ORAL
Qty: 60 TABLET | Refills: 0 | Status: SHIPPED | OUTPATIENT
Start: 2019-02-18 | End: 2019-04-21

## 2019-02-18 RX ORDER — TRAMADOL HYDROCHLORIDE 100 MG/1
TABLET, EXTENDED RELEASE ORAL
Qty: 30 TABLET | Refills: 0 | Status: SHIPPED
Start: 2019-02-18 | End: 2019-03-24

## 2019-02-18 NOTE — TELEPHONE ENCOUNTER
Medication: TraMADol HCl  MG Oral Tablet 24 Hr    Date of last refill: written 1/17/19 to fill 1/19/19  [place] 6/34/76 ET linda    Last office visit: 1/23/19  Due back to clinic per last office note:  Not indicated

## 2019-02-22 ENCOUNTER — TELEPHONE (OUTPATIENT)
Dept: SURGERY | Facility: CLINIC | Age: 64
End: 2019-02-22

## 2019-02-22 NOTE — TELEPHONE ENCOUNTER
The patient's regular [facility] does not have tramadol  mg qd. Bhupendra Rodriguez has it but doctor office has to call in rx. The [facility] in [address]

## 2019-02-22 NOTE — TELEPHONE ENCOUNTER
Called in Rx for Tramadol ER to Oliver Lema, pharmacist @ [facility] [place] with read back . Pharmacy will notify patient. Called Martita in [facility], spoke with [facility] and cancelled Rx for Tramadol ER.

## 2019-02-28 ENCOUNTER — TELEPHONE (OUTPATIENT)
Dept: SURGERY | Facility: CLINIC | Age: 64
End: 2019-02-28

## 2019-02-28 VITALS
SYSTOLIC BLOOD PRESSURE: 122 MMHG | DIASTOLIC BLOOD PRESSURE: 64 MMHG | HEART RATE: 61 BPM | BODY MASS INDEX: 39.56 KG/M2 | WEIGHT: 261 LBS | HEIGHT: 68 IN

## 2019-02-28 VITALS
HEIGHT: 68 IN | WEIGHT: 261 LBS | SYSTOLIC BLOOD PRESSURE: 130 MMHG | BODY MASS INDEX: 39.56 KG/M2 | DIASTOLIC BLOOD PRESSURE: 72 MMHG | HEART RATE: 70 BPM

## 2019-02-28 NOTE — TELEPHONE ENCOUNTER
Spoke to patient, confirmed procedure date of 3/5/19 and to be checked in at outpatient registration at 2:30 pm. Patient called pre-procedure line and understood instructions.  Patient instructed to call office if there are additional questions

## 2019-03-01 VITALS
BODY MASS INDEX: 37.44 KG/M2 | WEIGHT: 247 LBS | DIASTOLIC BLOOD PRESSURE: 82 MMHG | HEART RATE: 70 BPM | HEIGHT: 68 IN | SYSTOLIC BLOOD PRESSURE: 126 MMHG

## 2019-03-05 ENCOUNTER — APPOINTMENT (OUTPATIENT)
Dept: GENERAL RADIOLOGY | Facility: HOSPITAL | Age: 64
End: 2019-03-05
Attending: ANESTHESIOLOGY
Payer: COMMERCIAL

## 2019-03-05 ENCOUNTER — HOSPITAL ENCOUNTER (OUTPATIENT)
Facility: HOSPITAL | Age: 64
Setting detail: HOSPITAL OUTPATIENT SURGERY
Discharge: HOME | End: 2019-03-05
Attending: ANESTHESIOLOGY | Admitting: ANESTHESIOLOGY
Payer: COMMERCIAL

## 2019-03-05 VITALS
OXYGEN SATURATION: 100 % | SYSTOLIC BLOOD PRESSURE: 173 MMHG | HEART RATE: 60 BPM | TEMPERATURE: 97 F | RESPIRATION RATE: 16 BRPM | DIASTOLIC BLOOD PRESSURE: 69 MMHG

## 2019-03-05 DIAGNOSIS — M54.16 LUMBAR RADICULITIS: ICD-10-CM

## 2019-03-05 PROCEDURE — 3E0R33Z INTRODUCTION OF ANTI-INFLAMMATORY INTO SPINAL CANAL, PERCUTANEOUS APPROACH: ICD-10-PCS | Performed by: ANESTHESIOLOGY

## 2019-03-05 PROCEDURE — 3E0R3BZ INTRODUCTION OF ANESTHETIC AGENT INTO SPINAL CANAL, PERCUTANEOUS APPROACH: ICD-10-PCS | Performed by: ANESTHESIOLOGY

## 2019-03-05 RX ORDER — ONDANSETRON 2 MG/ML
4 INJECTION INTRAMUSCULAR; INTRAVENOUS ONCE AS NEEDED
Status: CANCELLED | OUTPATIENT
Start: 2019-03-05 | End: 2019-03-05

## 2019-03-05 RX ORDER — LIDOCAINE HYDROCHLORIDE 10 MG/ML
INJECTION, SOLUTION EPIDURAL; INFILTRATION; INTRACAUDAL; PERINEURAL AS NEEDED
Status: DISCONTINUED | OUTPATIENT
Start: 2019-03-05 | End: 2019-03-05

## 2019-03-05 RX ORDER — SODIUM CHLORIDE, SODIUM LACTATE, POTASSIUM CHLORIDE, CALCIUM CHLORIDE 600; 310; 30; 20 MG/100ML; MG/100ML; MG/100ML; MG/100ML
100 INJECTION, SOLUTION INTRAVENOUS CONTINUOUS
Status: CANCELLED | OUTPATIENT
Start: 2019-03-05

## 2019-03-05 RX ORDER — DIPHENHYDRAMINE HYDROCHLORIDE 50 MG/ML
50 INJECTION INTRAMUSCULAR; INTRAVENOUS ONCE AS NEEDED
Status: CANCELLED | OUTPATIENT
Start: 2019-03-05 | End: 2019-03-05

## 2019-03-05 RX ORDER — DEXAMETHASONE SODIUM PHOSPHATE 10 MG/ML
INJECTION, SOLUTION INTRAMUSCULAR; INTRAVENOUS AS NEEDED
Status: DISCONTINUED | OUTPATIENT
Start: 2019-03-05 | End: 2019-03-05

## 2019-03-05 NOTE — H&P
History & Physical Examination    Patient Name: Marciano Weiss  MRN: [id number]  [facility]: [id number]  YOB: 1955    Pre-Operative Diagnosis:  Lumbar radiculitis [M54.16]    Present Illness: A 59year old female with neck pain is here for left etienne Comment:Patient experiences a yeast infection with any             antibiotic administration please treat for yeast             infection whenever startingAn antibiotic    Past Medical History:   Diagnosis Date   • Arrhythmia     A-fib   • Back problem Smoker        Packs/day: 0.25        Years: 32.00        Pack years: 8      Smokeless tobacco: Never Used      Tobacco comment: 1/2 pack daily    Alcohol use: No      Alcohol/week: 0.0 oz      Comment: rare      SYSTEM Check if Review is Normal Check if Ph

## 2019-03-05 NOTE — OPERATIVE REPORT
[facility]  Operative Report  3/5/2019     Quita Yu Patient Status:  Hospital Outpatient Surgery    1955 MRN [id number]   [facility] ENDOSCOPY Attending Kristin Ball MD   Hosp Day # 0 PCP MD Dedra Becerra atraumatically under fluoroscopic guidance. The needle was advanced into the anterior epidural space at this level. The needle position was confirmed under AP and lateral fluoroscopic view.   Following negative aspiration for CSF and blood, approximately

## 2019-03-10 DIAGNOSIS — M54.16 LUMBAR RADICULITIS: ICD-10-CM

## 2019-03-10 DIAGNOSIS — M47.816 LUMBAR SPONDYLOSIS: ICD-10-CM

## 2019-03-11 RX ORDER — BUMETANIDE 2 MG/1
TABLET ORAL
Qty: 180 TABLET | Refills: 0 | Status: SHIPPED | OUTPATIENT
Start: 2019-03-11 | End: 2019-06-09

## 2019-03-11 RX ORDER — BACLOFEN 10 MG/1
TABLET ORAL
Qty: 120 TABLET | Refills: 0 | Status: SHIPPED | OUTPATIENT
Start: 2019-03-11 | End: 2019-05-05

## 2019-03-11 NOTE — TELEPHONE ENCOUNTER
Medication(s) to Refill:   Requested Prescriptions     Pending Prescriptions Disp Refills   • BUMETANIDE 2 MG Oral Tab [Pharmacy Med Name: BUMETANIDE 2MG TABLETS] 180 tablet 0     Sig: TAKE 2 TABLETS BY MOUTH DAILY         Reason for Medication Refill bein

## 2019-03-11 NOTE — TELEPHONE ENCOUNTER
Medication: BACLOFEN 10 MG     Date of last refill: 1/29/19    [place] 8/66/77    Last office visit: 1/23/19  Due back to clinic per last office note:  None noted  Date next office visit scheduled:  None scheduled    Las

## 2019-03-22 RX ORDER — METOPROLOL SUCCINATE 50 MG/1
TABLET, EXTENDED RELEASE ORAL
Qty: 30 TABLET | Refills: 0 | Status: SHIPPED | OUTPATIENT
Start: 2019-03-22 | End: 2019-04-21

## 2019-03-22 RX ORDER — PRAVASTATIN SODIUM 20 MG
TABLET ORAL
Qty: 30 TABLET | Refills: 0 | Status: SHIPPED | OUTPATIENT
Start: 2019-03-22 | End: 2019-04-21

## 2019-03-22 NOTE — TELEPHONE ENCOUNTER
Medication(s) to Refill:   Requested Prescriptions     Pending Prescriptions Disp Refills   • PRAVASTATIN SODIUM 20 MG Oral Tab [Pharmacy Med Name: PRAVASTATIN 20MG TABLETS] 90 tablet 0     Sig: TAKE 1 TABLET BY MOUTH EVERY EVENING   • METOPROLOL SUCCINATE

## 2019-03-24 DIAGNOSIS — M46.1 SACROILIITIS (HCC): ICD-10-CM

## 2019-03-24 DIAGNOSIS — M54.16 LUMBAR RADICULOPATHY: ICD-10-CM

## 2019-03-25 RX ORDER — TRAMADOL HYDROCHLORIDE 100 MG/1
TABLET, EXTENDED RELEASE ORAL
Qty: 30 TABLET | Refills: 0 | Status: SHIPPED | OUTPATIENT
Start: 2019-03-25 | End: 2019-04-21

## 2019-03-25 NOTE — TELEPHONE ENCOUNTER
Medication: TRAMADOL HCL  MG Oral Tablet 24 Hr    Date of last refill: 2/18/19  [place]17[place] ETM linda    Last office visit: 1/23/19  Due back to clinic per last office note:  Not indicated  Date next office visit

## 2019-03-27 NOTE — TELEPHONE ENCOUNTER
Received fax from [facility]: \"This medication is on a  back order. The Tramadol ER 100mg Capsules are available. Please advised if prescriber is agreeable to change. \"

## 2019-04-15 RX ORDER — POTASSIUM CHLORIDE 750 MG/1
TABLET, FILM COATED, EXTENDED RELEASE ORAL
Qty: 180 TABLET | Refills: 0 | Status: SHIPPED | OUTPATIENT
Start: 2019-04-15 | End: 2019-04-30

## 2019-04-15 NOTE — TELEPHONE ENCOUNTER
Medication(s) to Refill:   Requested Prescriptions     Pending Prescriptions Disp Refills   • POTASSIUM CHLORIDE ER 10 MEQ Oral Tab CR [Pharmacy Med Name: POTASSIUM CL 10MEQ ER TABLETS] 180 tablet 0     Sig: TAKE 1 TABLET BY MOUTH TWO TIMES DAILY         R

## 2019-04-17 RX ORDER — LEVOTHYROXINE SODIUM 0.15 MG/1
TABLET ORAL
COMMUNITY
Start: 2012-08-07

## 2019-04-17 RX ORDER — BUMETANIDE 2 MG/1
TABLET ORAL
COMMUNITY
Start: 2017-03-21

## 2019-04-17 RX ORDER — ACETAMINOPHEN 160 MG
TABLET,DISINTEGRATING ORAL
COMMUNITY
Start: 2013-10-01

## 2019-04-17 RX ORDER — METOPROLOL SUCCINATE 50 MG/1
TABLET, EXTENDED RELEASE ORAL
COMMUNITY
Start: 2017-09-26

## 2019-04-17 RX ORDER — ASPIRIN 325 MG
TABLET ORAL
COMMUNITY
Start: 2018-11-06 | End: 2019-11-01 | Stop reason: SDUPTHER

## 2019-04-17 RX ORDER — BACLOFEN 10 MG/1
TABLET ORAL
COMMUNITY
Start: 2018-11-06

## 2019-04-17 RX ORDER — PRAVASTATIN SODIUM 20 MG
TABLET ORAL
COMMUNITY
Start: 2017-09-26

## 2019-04-17 RX ORDER — POTASSIUM CHLORIDE 750 MG/1
TABLET, FILM COATED, EXTENDED RELEASE ORAL
COMMUNITY

## 2019-04-17 RX ORDER — ASPIRIN 325 MG
TABLET ORAL
COMMUNITY
Start: 2012-08-07

## 2019-04-17 RX ORDER — TRAMADOL HYDROCHLORIDE 50 MG/1
TABLET ORAL
COMMUNITY
Start: 2017-09-26

## 2019-04-17 RX ORDER — PHENTERMINE HYDROCHLORIDE 37.5 MG/1
TABLET ORAL
COMMUNITY
Start: 2017-09-26

## 2019-04-21 DIAGNOSIS — M54.16 LUMBAR RADICULOPATHY: ICD-10-CM

## 2019-04-21 DIAGNOSIS — M46.1 SACROILIITIS (HCC): ICD-10-CM

## 2019-04-22 RX ORDER — METOPROLOL SUCCINATE 50 MG/1
TABLET, EXTENDED RELEASE ORAL
Qty: 30 TABLET | Refills: 0 | Status: SHIPPED | OUTPATIENT
Start: 2019-04-22 | End: 2019-05-26

## 2019-04-22 RX ORDER — PRAVASTATIN SODIUM 20 MG
TABLET ORAL
Qty: 30 TABLET | Refills: 0 | Status: SHIPPED | OUTPATIENT
Start: 2019-04-22 | End: 2019-05-26

## 2019-04-22 NOTE — TELEPHONE ENCOUNTER
Medication(s) to Refill:   Requested Prescriptions     Pending Prescriptions Disp Refills   • PRAVASTATIN SODIUM 20 MG Oral Tab [Pharmacy Med Name: PRAVASTATIN 20MG TABLETS] 30 tablet 0     Sig: TAKE 1 TABLET BY MOUTH EVERY EVENING   • METOPROLOL SUCCINATE

## 2019-04-23 RX ORDER — TRAMADOL HYDROCHLORIDE 100 MG/1
TABLET, EXTENDED RELEASE ORAL
Qty: 30 TABLET | Refills: 0 | Status: SHIPPED
Start: 2019-04-23 | End: 2019-05-20

## 2019-04-23 RX ORDER — NAPROXEN 500 MG/1
TABLET ORAL
Qty: 60 TABLET | Refills: 0 | Status: SHIPPED | OUTPATIENT
Start: 2019-04-23 | End: 2019-06-24

## 2019-04-23 NOTE — TELEPHONE ENCOUNTER
Pt declined to schedule fup appt with Dr Wan Greenwood at this time-needs to check her schedule and will call back;medication check,future refills,last OV 1/2019

## 2019-04-23 NOTE — TELEPHONE ENCOUNTER
I have authorized the prescription for today, please have patient make an office visit prior to any more prescription refill. We require every 3 month office visit for patients who get controlled/narcotic medications from us.  Last office visit 1/2019

## 2019-04-23 NOTE — TELEPHONE ENCOUNTER
Medication: TRAMADOL HCL  MG Oral Tablet 24 Hr    Date of last refill: 3/25/19  [place] ETM linda    Last office visit: 1/23/19  Due back to clinic per last office note:  Not indicated  Date next office visit

## 2019-04-23 NOTE — TELEPHONE ENCOUNTER
Medication: NAPROXEN 500 MG Oral Tab    Date of last refill: 2/18/19  [place]    Last office visit: 1/23/19  Due back to clinic per last office note:  Not indicated  Date next office visit scheduled:  None scheduled

## 2019-04-24 NOTE — TELEPHONE ENCOUNTER
Pt scheduled a fup appt 5/20 with Dr Nathan Paz for a medication check and future refills, last OV 1/2019

## 2019-05-05 DIAGNOSIS — M47.816 LUMBAR SPONDYLOSIS: ICD-10-CM

## 2019-05-05 DIAGNOSIS — M54.16 LUMBAR RADICULITIS: ICD-10-CM

## 2019-05-06 RX ORDER — BACLOFEN 10 MG/1
TABLET ORAL
Qty: 120 TABLET | Refills: 0 | Status: SHIPPED | OUTPATIENT
Start: 2019-05-06 | End: 2019-09-08

## 2019-05-20 ENCOUNTER — TELEPHONE (OUTPATIENT)
Dept: PAIN CLINIC | Facility: CLINIC | Age: 64
End: 2019-05-20

## 2019-05-20 ENCOUNTER — APPOINTMENT (OUTPATIENT)
Dept: LAB | Age: 64
End: 2019-05-20
Attending: NURSE PRACTITIONER
Payer: COMMERCIAL

## 2019-05-20 DIAGNOSIS — F11.90 CHRONIC NARCOTIC USE: ICD-10-CM

## 2019-05-20 PROCEDURE — 80307 DRUG TEST PRSMV CHEM ANLYZR: CPT | Performed by: NURSE PRACTITIONER

## 2019-05-20 NOTE — PROGRESS NOTES
Name: Miriam Ledezma   : 1955   DOS: 2019     Pain Clinic Follow Up Visit:   Miriam Ledezma is a 59year old female who presents for medication check.  Patient with history of chronic pain currently on Tramadol ER 100mg daily and Tramadol IR MEQ Oral Tab CR Take 1 tablet (10 mEq total) by mouth 2 (two) times daily. Disp: 180 tablet Rfl: 4   Vitamin D3 (VITAMIN D3) 2000 UNITS Oral Cap Take 2,000 Units by mouth daily. Disp:  Rfl:    BIOTIN OR Take 10,000 mcg by mouth daily.  Disp:  Rfl:      No c and consultations:None    The patient indicates understanding of these issues and agrees to the plan. Return in about 3 months (around 8/20/2019) for Continuation of medication.     NAJMA Ortega, 5/20/2019, 9:14 AM

## 2019-05-20 NOTE — PROGRESS NOTES
Patient here c/o med check. Patient states pain is in her bilateral low back, bilateral legs, tailbone, numbness and tingling in legs, sensitive to touch with a \"pinching\" feel when walking.  Last dose Tramadol ER 5/19/19 at 2200, and last dose Tramadol 5

## 2019-05-28 RX ORDER — METOPROLOL SUCCINATE 50 MG/1
TABLET, EXTENDED RELEASE ORAL
Qty: 30 TABLET | Refills: 0 | Status: SHIPPED | OUTPATIENT
Start: 2019-05-28 | End: 2019-06-23

## 2019-05-28 RX ORDER — PRAVASTATIN SODIUM 20 MG
TABLET ORAL
Qty: 30 TABLET | Refills: 0 | Status: SHIPPED | OUTPATIENT
Start: 2019-05-28 | End: 2019-06-23

## 2019-06-08 ENCOUNTER — HOSPITAL ENCOUNTER (EMERGENCY)
Age: 64
Discharge: HOME | End: 2019-06-08
Attending: EMERGENCY MEDICINE
Payer: COMMERCIAL

## 2019-06-08 ENCOUNTER — APPOINTMENT (OUTPATIENT)
Dept: GENERAL RADIOLOGY | Age: 64
End: 2019-06-08
Attending: EMERGENCY MEDICINE
Payer: COMMERCIAL

## 2019-06-08 VITALS
TEMPERATURE: 98 F | HEIGHT: 66 IN | HEART RATE: 72 BPM | RESPIRATION RATE: 16 BRPM | DIASTOLIC BLOOD PRESSURE: 56 MMHG | SYSTOLIC BLOOD PRESSURE: 144 MMHG | WEIGHT: 280 LBS | OXYGEN SATURATION: 96 % | BODY MASS INDEX: 45 KG/M2

## 2019-06-08 DIAGNOSIS — R60.9 PERIPHERAL EDEMA: Primary | ICD-10-CM

## 2019-06-08 DIAGNOSIS — L03.119 CELLULITIS OF LOWER EXTREMITY, UNSPECIFIED LATERALITY: ICD-10-CM

## 2019-06-08 PROCEDURE — 99285 EMERGENCY DEPT VISIT HI MDM: CPT

## 2019-06-08 PROCEDURE — 85025 COMPLETE CBC W/AUTO DIFF WBC: CPT | Performed by: EMERGENCY MEDICINE

## 2019-06-08 PROCEDURE — 93005 ELECTROCARDIOGRAM TRACING: CPT

## 2019-06-08 PROCEDURE — 71046 X-RAY EXAM CHEST 2 VIEWS: CPT | Performed by: EMERGENCY MEDICINE

## 2019-06-08 PROCEDURE — 85610 PROTHROMBIN TIME: CPT | Performed by: EMERGENCY MEDICINE

## 2019-06-08 PROCEDURE — 80053 COMPREHEN METABOLIC PANEL: CPT | Performed by: EMERGENCY MEDICINE

## 2019-06-08 PROCEDURE — 84484 ASSAY OF TROPONIN QUANT: CPT | Performed by: EMERGENCY MEDICINE

## 2019-06-08 PROCEDURE — 93010 ELECTROCARDIOGRAM REPORT: CPT

## 2019-06-08 PROCEDURE — 83880 ASSAY OF NATRIURETIC PEPTIDE: CPT | Performed by: EMERGENCY MEDICINE

## 2019-06-08 PROCEDURE — 36415 COLL VENOUS BLD VENIPUNCTURE: CPT

## 2019-06-08 RX ORDER — CEPHALEXIN 500 MG/1
500 CAPSULE ORAL 4 TIMES DAILY
Qty: 40 CAPSULE | Refills: 0 | Status: SHIPPED | OUTPATIENT
Start: 2019-06-08 | End: 2019-06-18

## 2019-06-08 RX ORDER — FLUCONAZOLE 150 MG/1
150 TABLET ORAL ONCE
Qty: 1 TABLET | Refills: 0 | Status: SHIPPED | OUTPATIENT
Start: 2019-06-08 | End: 2019-06-08

## 2019-06-09 NOTE — ED PROVIDER NOTES
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 No Rales, no rhonchi, no wheezing, no stridor. ABDOMEN: Soft, nondistended,non tender  EXTREMITIES: 2+ bilateral lower extremity edema, no calf tenderness, dorsal pedal pulses present and equal bilaterally.   There is mi patient does have chronic bilateral lower extremity edema, there is mild cellulitis, patient given prescription for Keflex and was instructed to follow-up with her primary care physician and cardiologist for further evaluation of her peripheral edema.   Carolyn Barker

## 2019-06-09 NOTE — ED INITIAL ASSESSMENT (HPI)
Pt, who sts she has a chronic hx of cellulitis to her lower legs, presents to ER tonight because of increased swelling to both feet and weeping.

## 2019-06-10 ENCOUNTER — TELEPHONE (OUTPATIENT)
Dept: SURGERY | Facility: CLINIC | Age: 64
End: 2019-06-10

## 2019-06-10 ENCOUNTER — OFFICE VISIT (OUTPATIENT)
Dept: FAMILY MEDICINE CLINIC | Facility: CLINIC | Age: 64
End: 2019-06-10
Payer: COMMERCIAL

## 2019-06-10 VITALS
BODY MASS INDEX: 45.64 KG/M2 | WEIGHT: 284 LBS | HEART RATE: 60 BPM | SYSTOLIC BLOOD PRESSURE: 134 MMHG | RESPIRATION RATE: 16 BRPM | DIASTOLIC BLOOD PRESSURE: 80 MMHG | HEIGHT: 66 IN | TEMPERATURE: 99 F | OXYGEN SATURATION: 98 %

## 2019-06-10 DIAGNOSIS — I83.813 VARICOSE VEINS OF BILATERAL LOWER EXTREMITIES WITH PAIN: ICD-10-CM

## 2019-06-10 DIAGNOSIS — R60.9 EDEMA, UNSPECIFIED TYPE: ICD-10-CM

## 2019-06-10 DIAGNOSIS — L03.116 CELLULITIS OF LEFT LOWER EXTREMITY: Primary | ICD-10-CM

## 2019-06-10 DIAGNOSIS — L03.115 CELLULITIS OF RIGHT LOWER EXTREMITY: ICD-10-CM

## 2019-06-10 PROCEDURE — 99214 OFFICE O/P EST MOD 30 MIN: CPT | Performed by: NURSE PRACTITIONER

## 2019-06-10 RX ORDER — FLUCONAZOLE 150 MG/1
TABLET ORAL
Refills: 0 | COMMUNITY
Start: 2019-06-08 | End: 2019-10-08 | Stop reason: ALTCHOICE

## 2019-06-10 RX ORDER — BUMETANIDE 2 MG/1
4 TABLET ORAL 2 TIMES DAILY
Qty: 16 TABLET | Refills: 0 | Status: SHIPPED | OUTPATIENT
Start: 2019-06-10 | End: 2019-06-14

## 2019-06-10 RX ORDER — BUMETANIDE 2 MG/1
TABLET ORAL
Qty: 60 TABLET | Refills: 0 | Status: SHIPPED | OUTPATIENT
Start: 2019-06-10 | End: 2020-04-08

## 2019-06-10 NOTE — TELEPHONE ENCOUNTER
Pt is interested in either BGS or SCS, pt unsure which, pt would like call back to see if either of these would be helpful for her; pt states injections & tramadol aren't working, pt interested in further treatments

## 2019-06-10 NOTE — TELEPHONE ENCOUNTER
Patient is interested in pursuing further treatment, specifically asking for BGS SCS, Tramadol not working. Recommendations from last OV on 06/20/2017 were to return around 09/20/2017, patient never did f/u afterwards.   Would you like us to call her for a

## 2019-06-10 NOTE — TELEPHONE ENCOUNTER
Pt requesting further tx (BGS or SCS). LOV was 5/20/19. No indication of these tx or addt'l injections.      ASSESSMENT AND PLAN:   Chronic narcotic use  (primary encounter diagnosis)  Lumbar radiculopathy  Sacroiliitis (hcc)      1) Continue tramdol

## 2019-06-10 NOTE — PATIENT INSTRUCTIONS
Taking a Diuretic  Your healthcare provider has prescribed a diuretic, or “water pill,” to help your body get rid of excess water and salt and maintain appropriate fluid balance.  Taking your diuretic can help you feel better, breathe better, move more ea · Ask your healthcare provider or pharmacist before you take any other prescription or nonprescription medicine or herbal supplements. Some of them may interact with your diuretic and keep it from working correctly. · Limit exposure to sunlight.  ANUSHA martinez © 0402-7745 [employer] [address]. [address] [facility] [place], [address] [place]. All rights reserved. This information is not intended as a substitute for professional medical care. Always follow your healthcare professional's instructions.         Britney Vega When to seek medical advice  Call your healthcare provider right away if any of these occur:  · Red areas that spread  · Swelling or pain that gets worse  · Fluid leaking from the skin (pus)  · Fever higher of 100.4º F (38.0º C) or higher after 2 days on a

## 2019-06-10 NOTE — TELEPHONE ENCOUNTER
It does not appear that we have discussed SCS with her, so please have her make an office visit with Dr. Mega Grey to discuss if she is a candidate for SCS.

## 2019-06-10 NOTE — PROGRESS NOTES
Patient presents with:  ER F/U: ER follow up for cellulitis bilateral legs      HPI:    Yuliana Mcgrath is a 59year old female presents with ER follow up. Cellulitis: Bilateral lower extremities.  Reports  erythema, increased swelling to bilateral lower Potassium Chloride ER 10 MEQ Oral Tab CR Take 1 tablet (10 mEq total) by mouth 2 (two) times daily. Disp: 180 tablet Rfl: 4   Vitamin D3 (VITAMIN D3) 2000 UNITS Oral Cap Take 2,000 Units by mouth daily.  Disp:  Rfl:    BIOTIN OR Take 10,000 mcg by mouth d STEROID INJECTION MULTIPLE LEVEL Left 12/7/2015    Performed by Ivania Gastelum MD at [facility] MAIN OR   • UPPER ARM/ELBOW SURGERY UNLISTED  1/1/96    left      Family History   Problem Relation Age of Onset   • Hypertension Mother    • Hypertension Father and all orders for this visit:    Cellulitis of left lower extremity  -     US VENOUS DOPPLER LEG BILAT - DIAG IMG (CPT=93970);  Future  -     OP RERERRAL TO WOUND VISIT    Cellulitis of right lower extremity  -     US VENOUS DOPPLER LEG BILAT - DIAG IMG (C

## 2019-06-11 ENCOUNTER — PATIENT MESSAGE (OUTPATIENT)
Dept: FAMILY MEDICINE CLINIC | Facility: CLINIC | Age: 64
End: 2019-06-11

## 2019-06-11 NOTE — TELEPHONE ENCOUNTER
From: Bartolo Lew  To: Dragan Michelle MD  Sent: 6/11/2019 3:57 AM CDT  Subject: Other    I have a dental appointment today at 6 PM, since I am already on Keflex antibiotic for the problem in my legs do I still need to take the 4 Amoxicillin tablets?

## 2019-06-11 NOTE — TELEPHONE ENCOUNTER
Patient was seen in the ER on 6/8/19 and started on Keflex for cellulitis. Patient has a dental appointment today. Typically patient takes 2gm of Amoxicillin (4 capsules) prior to her appointment.   Patient is asking since she is on Keflex if she should s

## 2019-06-17 ENCOUNTER — APPOINTMENT (OUTPATIENT)
Dept: WOUND CARE | Age: 64
End: 2019-06-17
Attending: NURSE PRACTITIONER
Payer: COMMERCIAL

## 2019-06-17 RX ORDER — MELOXICAM 15 MG/1
TABLET ORAL
Qty: 30 TABLET | Refills: 0 | Status: SHIPPED | OUTPATIENT
Start: 2019-06-17 | End: 2019-07-14

## 2019-06-18 DIAGNOSIS — M46.1 SACROILIITIS (HCC): ICD-10-CM

## 2019-06-18 DIAGNOSIS — M54.16 LUMBAR RADICULOPATHY: ICD-10-CM

## 2019-06-18 RX ORDER — TRAMADOL HYDROCHLORIDE 100 MG/1
100 TABLET, EXTENDED RELEASE ORAL
Qty: 30 TABLET | Refills: 0 | Status: SHIPPED
Start: 2019-06-18 | End: 2019-07-21

## 2019-06-18 NOTE — TELEPHONE ENCOUNTER
Medication:     traMADol HCl  MG Oral Tablet 24 Hr            Date of last refill:     traMADol HCl  MG Oral Tablet 24 Hr (5/20/19)          [place]     traMADol HCl  MG Oral Tablet 24 Hr (5/20/19)       Radiology orders and consultations:None     The patient indicates understanding of these issues and agrees to the plan.   Return in about 3 months (around 8/20/2019) for Continuation of medication.     NAJMA Nichols, 5/20/2019, 9:14 AM

## 2019-06-24 ENCOUNTER — OFFICE VISIT (OUTPATIENT)
Dept: PAIN CLINIC | Facility: CLINIC | Age: 64
End: 2019-06-24
Payer: COMMERCIAL

## 2019-06-24 VITALS — WEIGHT: 284 LBS | BODY MASS INDEX: 45.64 KG/M2 | OXYGEN SATURATION: 99 % | HEART RATE: 62 BPM | HEIGHT: 66 IN

## 2019-06-24 DIAGNOSIS — M54.14 THORACIC RADICULITIS: ICD-10-CM

## 2019-06-24 DIAGNOSIS — M54.16 LUMBAR RADICULITIS: Primary | ICD-10-CM

## 2019-06-24 PROCEDURE — 99214 OFFICE O/P EST MOD 30 MIN: CPT | Performed by: ANESTHESIOLOGY

## 2019-06-24 RX ORDER — PRAVASTATIN SODIUM 20 MG
TABLET ORAL
Qty: 30 TABLET | Refills: 0 | Status: SHIPPED | OUTPATIENT
Start: 2019-06-24 | End: 2019-07-28

## 2019-06-24 RX ORDER — METOPROLOL SUCCINATE 50 MG/1
TABLET, EXTENDED RELEASE ORAL
Qty: 30 TABLET | Refills: 0 | Status: SHIPPED | OUTPATIENT
Start: 2019-06-24 | End: 2019-07-21

## 2019-06-24 NOTE — PROGRESS NOTES
Last dose Tramadol ER last night    Tramadol 50mg 1330    With , ok to hear PHI    Low back pain 7/10, groin area 7/10 (pt reports it feels like menstrual cramps), radiating down left leg into shin 7/10    Right buttocks 7/10    Would like to know i

## 2019-06-26 NOTE — PROGRESS NOTES
Name: Chestine Lanes   : 1955   DOS: 2019     Pain Clinic Follow Up Visit:   Chestine Lanes is a 59year old female who presents for recheck of her chronic low back pain.   She is status post bilateral sacroiliac joint and lumbar epidural ara Rfl: 0   BACLOFEN 10 MG Oral Tab TAKE 1 TABLET(10 MG) BY MOUTH EVERY 6 HOURS AS NEEDED FOR MUSCLE SPASM Disp: 120 tablet Rfl: 0   Levothyroxine Sodium (SYNTHROID) 175 MCG Oral Tab Take 1 tablet (175 mcg total) by mouth before breakfast. Disp: 90 tablet Rfl patient. The patient wanted to proceed with the procedure. The patient will be seen by Dr. Antonino Malik for a psychological evaluation.   If the psychological evaluation is favorable, she will be scheduled for spinal cord stimulator trial.  If the trial

## 2019-07-15 ENCOUNTER — HOSPITAL ENCOUNTER (OUTPATIENT)
Dept: MRI IMAGING | Age: 64
Discharge: HOME | End: 2019-07-15
Attending: ANESTHESIOLOGY
Payer: COMMERCIAL

## 2019-07-15 DIAGNOSIS — M54.16 LUMBAR RADICULITIS: ICD-10-CM

## 2019-07-15 DIAGNOSIS — M54.14 THORACIC RADICULITIS: ICD-10-CM

## 2019-07-15 PROCEDURE — 72148 MRI LUMBAR SPINE W/O DYE: CPT | Performed by: ANESTHESIOLOGY

## 2019-07-15 PROCEDURE — 72146 MRI CHEST SPINE W/O DYE: CPT | Performed by: ANESTHESIOLOGY

## 2019-07-15 RX ORDER — BUMETANIDE 2 MG/1
TABLET ORAL
Qty: 180 TABLET | Refills: 0 | Status: SHIPPED | OUTPATIENT
Start: 2019-07-15 | End: 2019-10-08 | Stop reason: ALTCHOICE

## 2019-07-15 RX ORDER — MELOXICAM 15 MG/1
TABLET ORAL
Qty: 30 TABLET | Refills: 0 | Status: SHIPPED | OUTPATIENT
Start: 2019-07-15 | End: 2019-08-11

## 2019-07-15 NOTE — TELEPHONE ENCOUNTER
Medication: MELOXICAM 15 MG Oral Tab    Date of last refill: 6/17/19  [place]    Last office visit: 6/24/19  Due back to clinic per last office note:  Not indicated  Date next office visit scheduled:  None scheduled file.  CORINA Mcwilliams MD, 6/24/2019, 6:54 PM

## 2019-07-15 NOTE — TELEPHONE ENCOUNTER
Medication(s) to Refill:   Requested Prescriptions     Pending Prescriptions Disp Refills   • BUMETANIDE 2 MG Oral Tab [Pharmacy Med Name: BUMETANIDE 2MG TABLETS] 180 tablet 0     Sig: TAKE 2 TABLETS BY MOUTH EVERY MORNING         Reason for Medication Ref

## 2019-07-21 DIAGNOSIS — M46.1 SACROILIITIS (HCC): ICD-10-CM

## 2019-07-21 DIAGNOSIS — M54.16 LUMBAR RADICULOPATHY: ICD-10-CM

## 2019-07-22 RX ORDER — METOPROLOL SUCCINATE 50 MG/1
TABLET, EXTENDED RELEASE ORAL
Qty: 30 TABLET | Refills: 0 | Status: SHIPPED | OUTPATIENT
Start: 2019-07-22 | End: 2019-08-25

## 2019-07-22 RX ORDER — TRAMADOL HYDROCHLORIDE 100 MG/1
TABLET, EXTENDED RELEASE ORAL
Qty: 30 TABLET | Refills: 0 | Status: SHIPPED
Start: 2019-07-22 | End: 2019-08-26

## 2019-07-22 NOTE — TELEPHONE ENCOUNTER
Medication(s) to Refill:   Requested Prescriptions     Pending Prescriptions Disp Refills   • METOPROLOL SUCCINATE ER 50 MG Oral Tablet 24 Hr [Pharmacy Med Name: METOPROLOL ER SUCCINATE 50MG TABS] 30 tablet 0     Sig: TAKE 1 TABLET BY MOUTH EVERY DAY

## 2019-07-22 NOTE — TELEPHONE ENCOUNTER
Medication: traMADol HCl  MG Oral Tablet 24 Hr    Date of last refill: 6/18/19  [place] 8[place]88    Last office visit: 6/24/19  Due back to clinic per last office note:  Not indicated  Date next office visit schedul file.  CORINA Mcwilliams MD, 6/24/2019, 6:54 PM

## 2019-07-29 RX ORDER — PRAVASTATIN SODIUM 20 MG
TABLET ORAL
Qty: 30 TABLET | Refills: 0 | Status: SHIPPED | OUTPATIENT
Start: 2019-07-29 | End: 2019-08-25

## 2019-07-29 NOTE — TELEPHONE ENCOUNTER
Medication(s) to Refill:   Requested Prescriptions     Pending Prescriptions Disp Refills   • PRAVASTATIN SODIUM 20 MG Oral Tab [Pharmacy Med Name: PRAVASTATIN 20MG TABLETS] 30 tablet 0     Sig: TAKE 1 TABLET BY MOUTH EVERY EVENING         Reason for Medic

## 2019-08-05 ENCOUNTER — TELEPHONE (OUTPATIENT)
Dept: SURGERY | Facility: CLINIC | Age: 64
End: 2019-08-05

## 2019-08-05 NOTE — TELEPHONE ENCOUNTER
Noted patient completed MRI Spine Thoracic and Lumbar 7/15/19. Will forward to provider for review. Also need Neuropsych eval report.

## 2019-08-05 NOTE — TELEPHONE ENCOUNTER
Per last OV notes 6/24/19     ASSESSMENT AND PLAN:   Lumbar radiculitis  Lumbar facet arthropathy  Sacroiliitis  Lumbosacral spondylosis     Today I had long discussion with the patient and her  about her management.   In the past I sent her to Dr. Annie Shultz

## 2019-08-08 NOTE — TELEPHONE ENCOUNTER
I believe the MRIs were done in consideration for SCS. Dr. Mathieu Dominguez wants to have her follow up in the office for further discussion prior to scheduling additional procedures. Thanks!

## 2019-08-09 NOTE — TELEPHONE ENCOUNTER
Noted patient scheduled to see Dr Mega Grey 8/21/19. Left a detailed message on patient's confidential voicemail informing below. Encouraged to call office back with any questions or concerns.

## 2019-08-12 RX ORDER — MELOXICAM 15 MG/1
TABLET ORAL
Qty: 30 TABLET | Refills: 2 | Status: SHIPPED | OUTPATIENT
Start: 2019-08-12 | End: 2019-10-09

## 2019-08-13 ENCOUNTER — TELEPHONE (OUTPATIENT)
Dept: PAIN CLINIC | Facility: CLINIC | Age: 64
End: 2019-08-13

## 2019-08-13 DIAGNOSIS — M54.16 LUMBAR RADICULOPATHY: Primary | ICD-10-CM

## 2019-08-13 NOTE — TELEPHONE ENCOUNTER
Receive neuropsych evaluation from Dr. Tiny Tapia and she is not a candidate for SCS trial at this time.  It is noted that she will benefit from 6-8 sessions with a pain psychologist to help develop positive coping and after which she can be re-evaluated for t

## 2019-08-21 ENCOUNTER — OFFICE VISIT (OUTPATIENT)
Dept: PAIN CLINIC | Facility: CLINIC | Age: 64
End: 2019-08-21
Payer: COMMERCIAL

## 2019-08-21 VITALS
HEART RATE: 62 BPM | DIASTOLIC BLOOD PRESSURE: 70 MMHG | BODY MASS INDEX: 44.68 KG/M2 | SYSTOLIC BLOOD PRESSURE: 132 MMHG | WEIGHT: 278 LBS | HEIGHT: 66 IN | OXYGEN SATURATION: 97 %

## 2019-08-21 DIAGNOSIS — M54.16 LUMBAR RADICULOPATHY: Primary | ICD-10-CM

## 2019-08-21 PROCEDURE — 99214 OFFICE O/P EST MOD 30 MIN: CPT | Performed by: ANESTHESIOLOGY

## 2019-08-21 RX ORDER — POTASSIUM CHLORIDE 750 MG/1
TABLET, FILM COATED, EXTENDED RELEASE ORAL
Refills: 0 | COMMUNITY
Start: 2019-08-11 | End: 2019-10-08 | Stop reason: ALTCHOICE

## 2019-08-21 NOTE — PROGRESS NOTES
Patient presents in office today with reported pain in pelvic area, low back, groin, down both legs, right buttocks    Current pain level reported = 6/10    Last reported dose of tramadol 100 mg last night     Tramadol mg 50 today     Imagin/15/19 MRI

## 2019-08-21 NOTE — PROGRESS NOTES
Spoke to patient regarding insurance coverage, provided guidance on what benefits and coverage information to request regarding coverage for SCS Trial/Implantation ([id number]) and Consultation with Dr. Christa Harp ([id number], [id number]).  Patient appreciative of informatio

## 2019-08-22 DIAGNOSIS — M54.16 LUMBAR RADICULITIS: Primary | ICD-10-CM

## 2019-08-22 RX ORDER — TRAMADOL HYDROCHLORIDE 50 MG/1
TABLET ORAL
Qty: 240 TABLET | Refills: 0 | Status: SHIPPED
Start: 2019-08-22 | End: 2019-10-02

## 2019-08-22 NOTE — TELEPHONE ENCOUNTER
Medication: Tramadol 50 mg tabs     Date of last refill: 5/20/19  [place] 6[place]59[place]73    Last office visit: 8/21/19   Due back to clinic per last office note:  None noted   Date next office visit scheduled:  None     Last UR

## 2019-08-24 NOTE — PROGRESS NOTES
Name: Ramya Ham   : 1955   DOS: 2019     Pain Clinic Follow Up Visit:   Ramya Ham is a 59year old female who presents for recheck of her chronic low back pain.   She is status post bilateral sacroiliac joint and lumbar epidural ara BY MOUTH DAILY Disp: 60 tablet Rfl: 0   fluconazole 150 MG Oral Tab TK 1 T PO QD FOR 1 DOSE Disp:  Rfl: 0   Naproxen Sodium (ALEVE) 220 MG Oral Tab Take by mouth.  Disp:  Rfl:    BACLOFEN 10 MG Oral Tab TAKE 1 TABLET(10 MG) BY MOUTH EVERY 6 HOURS AS NEEDED surgery can make her pain significantly worse. The surgery would be also very extensive surgery and the patient might not tolerate the surgery. The patient also failed the interventional treatment.   Today patient was here to discuss about psychological t

## 2019-08-26 DIAGNOSIS — M46.1 SACROILIITIS (HCC): ICD-10-CM

## 2019-08-26 DIAGNOSIS — M54.16 LUMBAR RADICULOPATHY: ICD-10-CM

## 2019-08-26 RX ORDER — METOPROLOL SUCCINATE 50 MG/1
TABLET, EXTENDED RELEASE ORAL
Qty: 30 TABLET | Refills: 0 | Status: SHIPPED | OUTPATIENT
Start: 2019-08-26 | End: 2019-09-22

## 2019-08-26 RX ORDER — PRAVASTATIN SODIUM 20 MG
TABLET ORAL
Qty: 30 TABLET | Refills: 0 | Status: SHIPPED | OUTPATIENT
Start: 2019-08-26 | End: 2019-09-29

## 2019-08-26 RX ORDER — TRAMADOL HYDROCHLORIDE 100 MG/1
100 TABLET, EXTENDED RELEASE ORAL
Qty: 30 TABLET | Refills: 0 | Status: SHIPPED
Start: 2019-08-26 | End: 2019-09-27

## 2019-08-26 NOTE — TELEPHONE ENCOUNTER
Received a refill request via fax from [facility] requesting Tramadol  mg tabs     Medication: TRAMADOL HCL  MG Oral Tablet 24 Hr    Date of last refill: 7/22/19   [place] 9[place]     Last office visi

## 2019-08-28 NOTE — TELEPHONE ENCOUNTER
Patient re-evaluated for SCS trial after office visit with Dr. Remington Izquierdo and Dr. Jocy Martinez and richard to proceed with SCS trial with concurrent therapy sessions. Please start PA and call patient to schedule when appropriate.

## 2019-08-28 NOTE — TELEPHONE ENCOUNTER
Spoke with Silvino Fraser at [address]  Pending Case #:[id number]    Time:13:59    Created Case for SCS Gary ([id number]) per [place] needs to be faxed for medical review -Attention Linh Hobbs at [phone] -Clinicals Faxed;Confirmation Heidy Omalley

## 2019-08-28 NOTE — TELEPHONE ENCOUNTER
Please begin prior auth for:    SCS trial with Dr. Elmer Taylor. Copy of evaluation provided to prior auth.

## 2019-09-03 NOTE — TELEPHONE ENCOUNTER
Spoke with Moo Minor at [address]  Time:6:42    Per Frank Ortiz Clinical Information was received on 08/28/19, and as of today case is still under medical review. Once Clinicals have been received it can take up to 5-7 business days for review.

## 2019-09-05 NOTE — TELEPHONE ENCOUNTER
Spoke with Shahrzad at [address] [place] [address]    Per Shahrzad Case is still pending and is under medical review.

## 2019-09-08 DIAGNOSIS — M54.16 LUMBAR RADICULITIS: ICD-10-CM

## 2019-09-08 DIAGNOSIS — M47.816 LUMBAR SPONDYLOSIS: ICD-10-CM

## 2019-09-09 RX ORDER — BACLOFEN 10 MG/1
TABLET ORAL
Qty: 120 TABLET | Refills: 0 | Status: SHIPPED | OUTPATIENT
Start: 2019-09-09 | End: 2019-11-10

## 2019-09-09 NOTE — TELEPHONE ENCOUNTER
Spoke with Jason Morgan at [address] [place] [address] Jason Morgan Case is still under medical review as of 09/09/19 and is pending.

## 2019-09-12 NOTE — TELEPHONE ENCOUNTER
Prior authorization request completed for: SCS Trial   Authorization #:[id number]  Spoke with Liss Garcia at [address]  Confirmed Authorization     Authorization dates: 08/28/19 - 11/28/19 at [facility]   CPT codes approved: 81454  Number of visits/keenan

## 2019-09-19 ENCOUNTER — TELEPHONE (OUTPATIENT)
Dept: SURGERY | Facility: CLINIC | Age: 64
End: 2019-09-19

## 2019-09-19 NOTE — TELEPHONE ENCOUNTER
Left message for patient, confirmed procedure date of Sept. 24th and to be checked in at outpatient registration at 1:15 pm. Patient instructed to call pre-procedure line before procedure at [phone].  Patient instructed to call office if there are vanda

## 2019-09-22 RX ORDER — METOPROLOL SUCCINATE 50 MG/1
TABLET, EXTENDED RELEASE ORAL
Qty: 30 TABLET | Refills: 0 | Status: SHIPPED | OUTPATIENT
Start: 2019-09-22 | End: 2019-10-27

## 2019-09-24 ENCOUNTER — HOSPITAL ENCOUNTER (OUTPATIENT)
Facility: HOSPITAL | Age: 64
Setting detail: HOSPITAL OUTPATIENT SURGERY
Discharge: HOME | End: 2019-09-24
Attending: ANESTHESIOLOGY | Admitting: ANESTHESIOLOGY
Payer: COMMERCIAL

## 2019-09-24 ENCOUNTER — TELEPHONE (OUTPATIENT)
Dept: PAIN CLINIC | Facility: CLINIC | Age: 64
End: 2019-09-24

## 2019-09-24 ENCOUNTER — APPOINTMENT (OUTPATIENT)
Dept: GENERAL RADIOLOGY | Facility: HOSPITAL | Age: 64
End: 2019-09-24
Attending: ANESTHESIOLOGY
Payer: COMMERCIAL

## 2019-09-24 VITALS
OXYGEN SATURATION: 98 % | RESPIRATION RATE: 18 BRPM | DIASTOLIC BLOOD PRESSURE: 70 MMHG | HEART RATE: 70 BPM | SYSTOLIC BLOOD PRESSURE: 137 MMHG | TEMPERATURE: 98 F

## 2019-09-24 DIAGNOSIS — M54.16 LUMBAR RADICULOPATHY: ICD-10-CM

## 2019-09-24 PROCEDURE — 00HU3MZ INSERTION OF NEUROSTIMULATOR LEAD INTO SPINAL CANAL, PERCUTANEOUS APPROACH: ICD-10-PCS | Performed by: ANESTHESIOLOGY

## 2019-09-24 PROCEDURE — 99152 MOD SED SAME PHYS/QHP 5/>YRS: CPT | Performed by: ANESTHESIOLOGY

## 2019-09-24 RX ORDER — FLUCONAZOLE 150 MG/1
150 TABLET ORAL ONCE
Qty: 1 TABLET | Refills: 0 | Status: SHIPPED | OUTPATIENT
Start: 2019-09-24 | End: 2019-09-24

## 2019-09-24 RX ORDER — ONDANSETRON 2 MG/ML
INJECTION INTRAMUSCULAR; INTRAVENOUS
Status: DISCONTINUED
Start: 2019-09-24 | End: 2019-09-24

## 2019-09-24 RX ORDER — ONDANSETRON 2 MG/ML
4 INJECTION INTRAMUSCULAR; INTRAVENOUS ONCE AS NEEDED
Status: DISCONTINUED | OUTPATIENT
Start: 2019-09-24 | End: 2019-09-24

## 2019-09-24 RX ORDER — ONDANSETRON 2 MG/ML
4 INJECTION INTRAMUSCULAR; INTRAVENOUS ONCE AS NEEDED
Status: COMPLETED | OUTPATIENT
Start: 2019-09-24 | End: 2019-09-24

## 2019-09-24 RX ORDER — SODIUM CHLORIDE, SODIUM LACTATE, POTASSIUM CHLORIDE, CALCIUM CHLORIDE 600; 310; 30; 20 MG/100ML; MG/100ML; MG/100ML; MG/100ML
100 INJECTION, SOLUTION INTRAVENOUS CONTINUOUS
Status: DISCONTINUED | OUTPATIENT
Start: 2019-09-24 | End: 2019-09-24

## 2019-09-24 RX ORDER — CEPHALEXIN 500 MG/1
500 CAPSULE ORAL EVERY 6 HOURS
Qty: 40 CAPSULE | Refills: 0 | Status: SHIPPED | OUTPATIENT
Start: 2019-09-25 | End: 2019-10-05

## 2019-09-24 RX ORDER — CEFAZOLIN SODIUM/WATER 2 G/20 ML
SYRINGE (ML) INTRAVENOUS
Status: DISCONTINUED
Start: 2019-09-24 | End: 2019-09-24

## 2019-09-24 RX ORDER — MIDAZOLAM HYDROCHLORIDE 1 MG/ML
INJECTION INTRAMUSCULAR; INTRAVENOUS AS NEEDED
Status: DISCONTINUED | OUTPATIENT
Start: 2019-09-24 | End: 2019-09-24

## 2019-09-24 RX ORDER — DIPHENHYDRAMINE HYDROCHLORIDE 50 MG/ML
50 INJECTION INTRAMUSCULAR; INTRAVENOUS ONCE AS NEEDED
Status: DISCONTINUED | OUTPATIENT
Start: 2019-09-24 | End: 2019-09-24

## 2019-09-24 RX ORDER — CEFAZOLIN SODIUM/WATER 2 G/20 ML
2 SYRINGE (ML) INTRAVENOUS ONCE
Status: COMPLETED | OUTPATIENT
Start: 2019-09-24 | End: 2019-09-24

## 2019-09-24 RX ORDER — LIDOCAINE HYDROCHLORIDE 10 MG/ML
INJECTION, SOLUTION EPIDURAL; INFILTRATION; INTRACAUDAL; PERINEURAL AS NEEDED
Status: DISCONTINUED | OUTPATIENT
Start: 2019-09-24 | End: 2019-09-24

## 2019-09-25 ENCOUNTER — TELEPHONE (OUTPATIENT)
Dept: SURGERY | Facility: CLINIC | Age: 64
End: 2019-09-25

## 2019-09-25 NOTE — TELEPHONE ENCOUNTER
Spoke to Shemar Chauhan, she stated patient is to call office after she has had her leads pulled by Dr. Sylvia Car on 10/02/19. We also still are working on a site for spinal cord STIMS.   We will place her on the list as well

## 2019-09-25 NOTE — OPERATIVE REPORT
[facility]  Operative Report  2019     Titus Verde Patient Status:  Hospital Outpatient Surgery    1955 MRN [id number]   [facility] ENDOSCOPY Attending No att. providers found   Hosp Day # 0 PCP Kellie Kent MD     In with intermittent IV propofol, midazolam, and Fentanyl. The skin and subcutaneous tissue was anesthetized with 1% lidocaine at the L3 level around the midline. A #11 blade was used to make a small skin incision.   A 14-gauge Tuohy needle was inserted and relief.   At that time, the trial lead will be removed and the future decision for permanent trial or abandoning this option will be determined according to the patient's response during this week of trial.  The patient understood fully and was discharged i

## 2019-09-25 NOTE — TELEPHONE ENCOUNTER
Patient called office this morning inidicating that she had SCS trial put in yesterday by Dr. Jerris Cranker. Needs appointment with Dr. Narcisa Cervantes by october 2nd to discuss Spinial cord implant. Where can I add her on at?   It looks like she was last seen by Dr. Neva Pennington

## 2019-09-26 DIAGNOSIS — M46.1 SACROILIITIS (HCC): ICD-10-CM

## 2019-09-26 DIAGNOSIS — M54.16 LUMBAR RADICULOPATHY: ICD-10-CM

## 2019-09-27 RX ORDER — TRAMADOL HYDROCHLORIDE 100 MG/1
100 TABLET, EXTENDED RELEASE ORAL
Qty: 30 TABLET | Refills: 0 | Status: SHIPPED | OUTPATIENT
Start: 2019-09-27 | End: 2019-10-10

## 2019-09-30 RX ORDER — PRAVASTATIN SODIUM 20 MG
TABLET ORAL
Qty: 30 TABLET | Refills: 0 | Status: SHIPPED | OUTPATIENT
Start: 2019-09-30 | End: 2019-10-08

## 2019-10-02 ENCOUNTER — OFFICE VISIT (OUTPATIENT)
Dept: PAIN CLINIC | Facility: CLINIC | Age: 64
End: 2019-10-02
Payer: COMMERCIAL

## 2019-10-02 VITALS — HEIGHT: 66 IN | HEART RATE: 69 BPM | WEIGHT: 270 LBS | OXYGEN SATURATION: 94 % | BODY MASS INDEX: 43.39 KG/M2

## 2019-10-02 DIAGNOSIS — M54.16 LUMBAR RADICULITIS: ICD-10-CM

## 2019-10-02 PROCEDURE — 99214 OFFICE O/P EST MOD 30 MIN: CPT | Performed by: ANESTHESIOLOGY

## 2019-10-02 RX ORDER — TRAMADOL HYDROCHLORIDE 50 MG/1
TABLET ORAL
Qty: 240 TABLET | Refills: 0 | Status: SHIPPED | OUTPATIENT
Start: 2019-10-02 | End: 2019-10-02

## 2019-10-02 RX ORDER — TRAMADOL HYDROCHLORIDE 50 MG/1
TABLET ORAL
Qty: 240 TABLET | Refills: 0 | Status: SHIPPED | OUTPATIENT
Start: 2019-10-02 | End: 2019-11-10

## 2019-10-02 NOTE — PROGRESS NOTES
Patient presents in office today with reported pain in bilateral low back, legs, hips.  Patient here for SCS trial removal    Current pain level reported = 10/10    Last reported dose of    traMADol HCl  MG Oral Tablet 24 Hr = 2300 last night    traMA

## 2019-10-06 NOTE — PROGRESS NOTES
Name: Gregg Hanson   : 1955   DOS: 10/2/2019     Pain Clinic Follow Up Visit:   Gregg Hanson is a 59year old female who presents for recheck of her chronic low back pain.   She is status post bilateral sacroiliac joint and lumbar epidural ara EVENING Disp: 30 tablet Rfl: 0   traMADol HCl  MG Oral Tablet 24 Hr Take 1 tablet (100 mg total) by mouth once daily. Disp: 30 tablet Rfl: 0   cephALEXin (KEFLEX) 500 MG Oral Cap Take 1 capsule (500 mg total) by mouth every 6 (six) hours for 10 days. positive on the left at 15 degrees and negative on the right. Motor 5 out of 5, sensory is intact and reflexes 2+. Dorsalis pedis is palpable bilaterally.  Heel walking and toe walking is normal.      ASSESSMENT AND PLAN:   Spinal stenosis with neurogenic c

## 2019-10-08 ENCOUNTER — OFFICE VISIT (OUTPATIENT)
Dept: FAMILY MEDICINE CLINIC | Facility: CLINIC | Age: 64
End: 2019-10-08
Payer: COMMERCIAL

## 2019-10-08 VITALS
HEIGHT: 66 IN | RESPIRATION RATE: 22 BRPM | OXYGEN SATURATION: 98 % | SYSTOLIC BLOOD PRESSURE: 128 MMHG | TEMPERATURE: 99 F | WEIGHT: 279 LBS | HEART RATE: 78 BPM | BODY MASS INDEX: 44.84 KG/M2 | DIASTOLIC BLOOD PRESSURE: 78 MMHG

## 2019-10-08 DIAGNOSIS — E78.00 PURE HYPERCHOLESTEROLEMIA: ICD-10-CM

## 2019-10-08 DIAGNOSIS — Z13.29 SCREENING FOR ENDOCRINE, NUTRITIONAL, METABOLIC AND IMMUNITY DISORDER: ICD-10-CM

## 2019-10-08 DIAGNOSIS — M54.50 CHRONIC BILATERAL LOW BACK PAIN WITHOUT SCIATICA: ICD-10-CM

## 2019-10-08 DIAGNOSIS — G89.29 CHRONIC BILATERAL LOW BACK PAIN WITHOUT SCIATICA: ICD-10-CM

## 2019-10-08 DIAGNOSIS — Z12.11 SCREENING FOR COLON CANCER: ICD-10-CM

## 2019-10-08 DIAGNOSIS — Z13.0 SCREENING FOR ENDOCRINE, NUTRITIONAL, METABOLIC AND IMMUNITY DISORDER: ICD-10-CM

## 2019-10-08 DIAGNOSIS — E03.9 ACQUIRED HYPOTHYROIDISM: Chronic | ICD-10-CM

## 2019-10-08 DIAGNOSIS — Z13.21 SCREENING FOR ENDOCRINE, NUTRITIONAL, METABOLIC AND IMMUNITY DISORDER: ICD-10-CM

## 2019-10-08 DIAGNOSIS — Z00.00 ROUTINE GENERAL MEDICAL EXAMINATION AT A HEALTH CARE FACILITY: Primary | ICD-10-CM

## 2019-10-08 DIAGNOSIS — Z12.31 VISIT FOR SCREENING MAMMOGRAM: ICD-10-CM

## 2019-10-08 DIAGNOSIS — Z13.228 SCREENING FOR ENDOCRINE, NUTRITIONAL, METABOLIC AND IMMUNITY DISORDER: ICD-10-CM

## 2019-10-08 PROCEDURE — 99213 OFFICE O/P EST LOW 20 MIN: CPT | Performed by: FAMILY MEDICINE

## 2019-10-08 PROCEDURE — 99396 PREV VISIT EST AGE 40-64: CPT | Performed by: FAMILY MEDICINE

## 2019-10-08 RX ORDER — PRAVASTATIN SODIUM 20 MG
TABLET ORAL
Qty: 90 TABLET | Refills: 4 | Status: SHIPPED | OUTPATIENT
Start: 2019-10-08 | End: 2020-11-10

## 2019-10-08 NOTE — PATIENT INSTRUCTIONS
No NSAIDs. Leg Swelling in Both Legs    Swelling of the feet, ankles, and legs is called edema. It is caused by excess fluid that has collected in the tissues. Extra fluid in the body settles in the lowest part because of gravity.  This is why the legs a It helps circulate the blood that has collected in your leg. Talk with your provider about using support stockings to stop daytime leg swelling.   · If your provider says that heart disease is causing your leg swelling, follow a low-salt diet to stop extra

## 2019-10-09 ENCOUNTER — TELEPHONE (OUTPATIENT)
Dept: PAIN CLINIC | Facility: CLINIC | Age: 64
End: 2019-10-09

## 2019-10-09 DIAGNOSIS — M46.1 SACROILIITIS (HCC): ICD-10-CM

## 2019-10-09 DIAGNOSIS — M54.16 LUMBAR RADICULOPATHY: ICD-10-CM

## 2019-10-09 NOTE — PROGRESS NOTES
Titus Verde is a 59year old female who presents for a complete physical exam, no gyn. HPI:     Patient presents with:  Physical: Back pain      Patient feels well, dental visit up to date, no hearing problem. Vaccinations: pt refuses.     Pt has ch Oral Cap Take 2,000 Units by mouth daily. Disp:  Rfl:    BIOTIN OR Take 10,000 mcg by mouth daily.  Disp:  Rfl:       Past Medical History:   Diagnosis Date   • Arrhythmia     A-fib   • Back problem    • Cancer ([facility] 2012    skin cancer removed  from leg UNLISTED  1/1/96    left      Family History   Problem Relation Age of Onset   • Hypertension Mother    • Hypertension Father       Social History    Tobacco Use      Smoking status: Current Every Day Smoker        Packs/day: 0.25        Years: 32.00 normal and symmetric. Sensation intact. Extremities: are symmetric with no cyanosis, clubbing, 2+ pitting edema. MS: limited ROM of the lower back. SKIN: Normal color, turgor, no lesions, rashes or wounds. PSYCH: Normal affect and mood.           ASSESSM

## 2019-10-10 RX ORDER — TRAMADOL HYDROCHLORIDE 100 MG/1
200 TABLET, EXTENDED RELEASE ORAL
Qty: 60 TABLET | Refills: 0 | COMMUNITY
Start: 2019-10-10 | End: 2019-10-27

## 2019-10-14 ENCOUNTER — PATIENT MESSAGE (OUTPATIENT)
Dept: FAMILY MEDICINE CLINIC | Facility: CLINIC | Age: 64
End: 2019-10-14

## 2019-10-19 ENCOUNTER — APPOINTMENT (OUTPATIENT)
Dept: LAB | Age: 64
End: 2019-10-19
Attending: FAMILY MEDICINE
Payer: COMMERCIAL

## 2019-10-19 LAB
ABSOLUTE IMMATURE GRANULOCYTES (OFFPRE24): NORMAL
ALBUMIN SERPL-MCNC: 3.4 G/DL
ALBUMIN/GLOB SERPL: NORMAL {RATIO}
ALP SERPL-CCNC: 87 U/L
ALT SERPL-CCNC: 19 U/L
ANION GAP SERPL CALC-SCNC: NORMAL MMOL/L
AST SERPL-CCNC: 19 U/L
BASO+EOS+MONOS # BLD: NORMAL 10*3/UL
BASO+EOS+MONOS NFR BLD: NORMAL %
BASOPHILS # BLD: NORMAL 10*3/UL
BASOPHILS NFR BLD: NORMAL %
BILIRUB SERPL-MCNC: 0.6 MG/DL
BUN SERPL-MCNC: 21 MG/DL
BUN/CREAT SERPL: NORMAL
CALCIUM SERPL-MCNC: 9.1 MG/DL
CHLORIDE SERPL-SCNC: 108 MMOL/L
CHOLEST SERPL-MCNC: 150 MG/DL
CHOLEST/HDLC SERPL: NORMAL {RATIO}
CO2 SERPL-SCNC: NORMAL MMOL/L
CREAT SERPL-MCNC: 0.83 MG/DL
DIFFERENTIAL METHOD BLD: NORMAL
EOSINOPHIL # BLD: NORMAL 10*3/UL
EOSINOPHIL NFR BLD: NORMAL %
ERYTHROCYTE [DISTWIDTH] IN BLOOD: NORMAL %
GLOBULIN SER-MCNC: 3.1 G/DL
GLUCOSE SERPL-MCNC: 98 MG/DL
HCT VFR BLD CALC: 38.7 %
HDLC SERPL-MCNC: 48 MG/DL
HGB BLD-MCNC: 12.2 G/DL
IMMATURE GRANULOCYTES (OFFPRE25): NORMAL
LDLC SERPL CALC-MCNC: 88 MG/DL
LENGTH OF FAST TIME PATIENT: NORMAL H
LENGTH OF FAST TIME PATIENT: NORMAL H
LYMPHOCYTES # BLD: NORMAL 10*3/UL
LYMPHOCYTES NFR BLD: NORMAL %
MCH RBC QN AUTO: NORMAL PG
MCHC RBC AUTO-ENTMCNC: NORMAL G/DL
MCV RBC AUTO: NORMAL FL
MONOCYTES # BLD: NORMAL 10*3/UL
MONOCYTES NFR BLD: NORMAL %
MPV (OFFPRE2): NORMAL
NEUTROPHILS # BLD: NORMAL 10*3/UL
NEUTROPHILS NFR BLD: NORMAL %
NONHDLC SERPL-MCNC: 102 MG/DL
NRBC BLD MANUAL-RTO: NORMAL %
PLAT MORPH BLD: NORMAL
PLATELET # BLD: 297 10*3/UL
POTASSIUM SERPL-SCNC: 3.9 MMOL/L
PROT SERPL-MCNC: 6.5 G/DL
RBC # BLD: 4.17 10*6/UL
RBC MORPH BLD: NORMAL
SODIUM SERPL-SCNC: 143 MMOL/L
TRIGL SERPL-MCNC: 70 MG/DL
TSH SERPL-ACNC: 0.22 M[IU]/L
VLDLC SERPL CALC-MCNC: NORMAL MG/DL
WBC # BLD: 6.5 10*3/UL
WBC MORPH BLD: NORMAL

## 2019-10-21 ENCOUNTER — TELEPHONE (OUTPATIENT)
Dept: FAMILY MEDICINE CLINIC | Facility: CLINIC | Age: 64
End: 2019-10-21

## 2019-10-21 RX ORDER — BUMETANIDE 2 MG/1
TABLET ORAL
Qty: 180 TABLET | Refills: 1 | Status: SHIPPED | OUTPATIENT
Start: 2019-10-21 | End: 2020-05-18

## 2019-10-21 RX ORDER — POTASSIUM CHLORIDE 750 MG/1
TABLET, FILM COATED, EXTENDED RELEASE ORAL
Qty: 180 TABLET | Refills: 0 | Status: SHIPPED | OUTPATIENT
Start: 2019-10-21 | End: 2020-04-13

## 2019-10-21 NOTE — TELEPHONE ENCOUNTER
Pt needs a note for work stating that she uses a walker. Pt also needs a closer parking spot at work due to her using a walker. Pt is also asking what dx code is on her disability papers. Pt said she will go into more detail with the nurse.

## 2019-10-21 NOTE — TELEPHONE ENCOUNTER
Medication(s) to Refill:   Requested Prescriptions     Pending Prescriptions Disp Refills   • BUMETANIDE 2 MG Oral Tab [Pharmacy Med Name: BUMETANIDE 2MG TABLETS] 180 tablet 0     Sig: TAKE 2 TABLETS BY MOUTH EVERY MORNING             Last Time Medication

## 2019-10-22 ENCOUNTER — PATIENT MESSAGE (OUTPATIENT)
Dept: FAMILY MEDICINE CLINIC | Facility: CLINIC | Age: 64
End: 2019-10-22

## 2019-10-22 RX ORDER — MELOXICAM 15 MG/1
TABLET ORAL
Qty: 15 TABLET | Refills: 2 | COMMUNITY
Start: 2019-10-22 | End: 2019-10-22

## 2019-10-22 RX ORDER — MELOXICAM 15 MG/1
TABLET ORAL
Qty: 15 TABLET | Refills: 1 | Status: SHIPPED | OUTPATIENT
Start: 2019-10-22 | End: 2019-12-30

## 2019-10-22 NOTE — TELEPHONE ENCOUNTER
From: Aurelia Farmer  Sent: 10/22/2019 4:59 PM CDT  To: [facility] Clinical Staff  Subject: RE:Follow up    Thank you Samreen, I had people eavesdropping this morning when you called so I apologize if I was short on the phone.  Dr Elizabeth Coyle office previously filled m

## 2019-10-22 NOTE — TELEPHONE ENCOUNTER
Pt is asking for a note for work stating she uses a walker. LOV 10/8. OK for note?     Pt is also asking if she can resume her Meloxicam, she states right before she saw you, she was not taking her Meloxicam because she was waiting to get her Rx so she was

## 2019-10-22 NOTE — TELEPHONE ENCOUNTER
Pt is asking if you will fill her Meloxicam QOD prescriptions or should she continue to have Dr. Denney Primrose fill this?

## 2019-10-27 DIAGNOSIS — M54.16 LUMBAR RADICULOPATHY: ICD-10-CM

## 2019-10-27 DIAGNOSIS — M46.1 SACROILIITIS (HCC): ICD-10-CM

## 2019-10-28 RX ORDER — METOPROLOL SUCCINATE 50 MG/1
TABLET, EXTENDED RELEASE ORAL
Qty: 90 TABLET | Refills: 0 | Status: SHIPPED | OUTPATIENT
Start: 2019-10-28 | End: 2020-02-03

## 2019-10-28 RX ORDER — TRAMADOL HYDROCHLORIDE 100 MG/1
200 TABLET, EXTENDED RELEASE ORAL
Qty: 90 TABLET | Refills: 3 | Status: SHIPPED | OUTPATIENT
Start: 2019-10-28 | End: 2019-11-04

## 2019-10-28 NOTE — TELEPHONE ENCOUNTER
Medication(s) to Refill:   Requested Prescriptions     Pending Prescriptions Disp Refills   • traMADol HCl  MG Oral Tablet 24 Hr 60 tablet 0     Sig: Take 2 tablets (200 mg total) by mouth once daily.          Reason for Medication Refill being sent t

## 2019-10-30 ENCOUNTER — TELEPHONE (OUTPATIENT)
Dept: FAMILY MEDICINE CLINIC | Facility: CLINIC | Age: 64
End: 2019-10-30

## 2019-10-30 NOTE — TELEPHONE ENCOUNTER
----- Message from Meche Calhoun MD sent at 10/29/2019 12:35 PM CDT -----  Normal, TSH is little low, ok to forward it to Dr. Kaycee Aguirre

## 2019-10-31 ENCOUNTER — DOCUMENTATION (OUTPATIENT)
Dept: CARDIOLOGY | Age: 64
End: 2019-10-31

## 2019-10-31 ENCOUNTER — PATIENT MESSAGE (OUTPATIENT)
Dept: FAMILY MEDICINE CLINIC | Facility: CLINIC | Age: 64
End: 2019-10-31

## 2019-10-31 ENCOUNTER — CLINICAL ABSTRACT (OUTPATIENT)
Dept: CARDIOLOGY | Age: 64
End: 2019-10-31

## 2019-10-31 ENCOUNTER — TELEPHONE (OUTPATIENT)
Dept: FAMILY MEDICINE CLINIC | Facility: CLINIC | Age: 64
End: 2019-10-31

## 2019-10-31 NOTE — TELEPHONE ENCOUNTER
From: Lauryn De  To: Emelyn Silva MD  Sent: 10/31/2019 6:18 AM CDT  Subject: Test Results Question    I have an appointment with Dr. Fareed De La Rosa next week. ..can you please send his office a copy of my test results. Juancarlos Frias .he will not have to order any lab

## 2019-10-31 NOTE — TELEPHONE ENCOUNTER
Patient called regarding her traMADol HCl  MG Oral Tablet 24 Hr, it was denied, Pt was told that if the prescription is for 200mg pills 1 pill at bed time, it should get covered.

## 2019-11-04 RX ORDER — TRAMADOL HYDROCHLORIDE 200 MG/1
200 TABLET, EXTENDED RELEASE ORAL
Qty: 90 TABLET | Refills: 3 | COMMUNITY
Start: 2019-11-04 | End: 2020-04-08

## 2019-11-07 ENCOUNTER — PATIENT MESSAGE (OUTPATIENT)
Dept: FAMILY MEDICINE CLINIC | Facility: CLINIC | Age: 64
End: 2019-11-07

## 2019-11-08 NOTE — TELEPHONE ENCOUNTER
----- Message from Zully Shi MD sent at 11/8/2019  2:46 PM CST -----  Regarding: FW: Non-Urgent Medical Question  Contact: [phone]  yes  ----- Message -----  From: Lizabeth Rossi RN  Sent: 11/7/2019   8:40 PM CST  To: Zully Shi MD  Subject

## 2019-11-10 DIAGNOSIS — M54.16 LUMBAR RADICULITIS: ICD-10-CM

## 2019-11-10 DIAGNOSIS — M47.816 LUMBAR SPONDYLOSIS: ICD-10-CM

## 2019-11-11 RX ORDER — BACLOFEN 10 MG/1
TABLET ORAL
Qty: 120 TABLET | Refills: 0 | Status: SHIPPED | OUTPATIENT
Start: 2019-11-11 | End: 2019-12-26

## 2019-11-11 RX ORDER — TRAMADOL HYDROCHLORIDE 50 MG/1
TABLET ORAL
Qty: 240 TABLET | Refills: 0 | Status: SHIPPED | OUTPATIENT
Start: 2019-11-11 | End: 2020-01-02

## 2019-11-11 NOTE — TELEPHONE ENCOUNTER
2 Rx requests    Medication:     BACLOFEN 10 MG Oral Tab    And    traMADol HCl  MG Oral Tablet 24 Hr    Date of last refill:    BACLOFEN 10 MG Oral Tab (9/9/19)    And    traMADol HCl  MG Oral Tablet 24 Hr (10/28/19)    Date last filled per [place] cord stimulator after the [facility].   Orders:No orders of the defined types were placed in this encounter.        Medications filled today:  Requested Prescriptions             Signed Prescriptions Disp Refills   • traMADol HCl 50 MG Oral Tab 240 tablet 0

## 2019-11-14 ENCOUNTER — TELEPHONE (OUTPATIENT)
Dept: PAIN CLINIC | Facility: CLINIC | Age: 64
End: 2019-11-14

## 2019-11-14 RX ORDER — NAPROXEN 500 MG/1
TABLET ORAL
Qty: 60 TABLET | Refills: 0 | Status: SHIPPED | OUTPATIENT
Start: 2019-11-14 | End: 2019-11-18 | Stop reason: ALTCHOICE

## 2019-11-14 NOTE — TELEPHONE ENCOUNTER
Please let her know that she cannot take naproxen on the same days that she take meloxicam, if she is still taking it. Thanks!

## 2019-12-25 DIAGNOSIS — M47.816 LUMBAR SPONDYLOSIS: ICD-10-CM

## 2019-12-25 DIAGNOSIS — M54.16 LUMBAR RADICULITIS: ICD-10-CM

## 2019-12-26 RX ORDER — BACLOFEN 10 MG/1
TABLET ORAL
Qty: 120 TABLET | Refills: 0 | Status: SHIPPED | OUTPATIENT
Start: 2019-12-26 | End: 2020-01-22

## 2019-12-26 NOTE — TELEPHONE ENCOUNTER
Medication: BACLOFEN 10 MG Oral Tab    Date of last refill: 11/11/19  [place]    Last office visit: 10/02/19  Due back to clinic per last office note: Not indicated   Date next office visit scheduled: None    Last UR     Sig: TAKE 1 TO 2 TABLETS BY MOUTH EVERY 6 HOURS AS NEEDED         Radiology orders and consultations:None     The patient indicates understanding of these issues and agrees to the plan.   No follow-ups on file.  [address], MD, 10/2/2019, 6:54

## 2019-12-29 NOTE — PROGRESS NOTES
"-- DO NOT REPLY / DO NOT REPLY ALL --  -- Message is from the [employer]--    General Patient Message      Reason for Call: Patient is attempting to schedule an appointment, per your request for refill on blood pressure medication; however, there are no availabilities at this time. Patient has one pill remaining at this time and would like to come in to the office on Tuesday since this is her off day. Please call patient to advise. Caller Information       Type Contact Phone    12/29/2019 07:12 AM Phone (Incoming) Roxy Skaggs (Self) [phone] (M)          Alternative phone number: Na    Turnaround time given to caller: ""This message will be sent to [facility] Provider's name]. The clinical team will fulfill your request as soon as they review your message when the office opens tomorrow. \""}    " Name: Tori Key   : 1955   DOS: 10/8/2018     Pain Clinic Follow Up Visit:   Tori Key is a 65year old female who presents for recheck of medications for her chronic low back pain which is in the lower back and radiating into the left associated with the pain. Current Outpatient Medications:  baclofen 10 MG Oral Tab Take 1 tablet (10 mg total) by mouth every 8 (eight) hours as needed (for muscle spasm).  Disp: 90 tablet Rfl: 0   TraMADol HCl 50 MG Oral Tab Take one to two tablets ev pain to palpation over hip joints bilaterally. Positive for 1+ bilateral lower extremity edema and skin discoloration consistent with venous insufficiency.     ASSESSMENT AND PLAN:   Long term (current) use of opiate analgesic  Sacroiliitis (hcc)  (primary

## 2019-12-30 RX ORDER — MELOXICAM 15 MG/1
TABLET ORAL
Qty: 15 TABLET | Refills: 1 | Status: SHIPPED | OUTPATIENT
Start: 2019-12-30 | End: 2020-03-02

## 2019-12-30 NOTE — TELEPHONE ENCOUNTER
Medication(s) to Refill:   Requested Prescriptions     Pending Prescriptions Disp Refills   • MELOXICAM 15 MG Oral Tab [Pharmacy Med Name: MELOXICAM 15MG TABLETS] 15 tablet 1     Sig: TAKE 1 TABLET(15 MG) BY MOUTH EVERY OTHER DAY         Reason for Medicat

## 2020-01-01 DIAGNOSIS — M54.16 LUMBAR RADICULITIS: ICD-10-CM

## 2020-01-02 RX ORDER — TRAMADOL HYDROCHLORIDE 50 MG/1
TABLET ORAL
Qty: 240 TABLET | Refills: 0 | Status: SHIPPED | OUTPATIENT
Start: 2020-01-02 | End: 2020-04-08

## 2020-01-14 ENCOUNTER — PATIENT MESSAGE (OUTPATIENT)
Dept: FAMILY MEDICINE CLINIC | Facility: CLINIC | Age: 65
End: 2020-01-14

## 2020-01-15 NOTE — TELEPHONE ENCOUNTER
From: Lauryn De  To: Emelyn Silva MD  Sent: 1/14/2020 4:37 PM CST  Subject: Other    I have an appointment [facility]/20 [facility]. They did not tell me which Doctor, at least not today. I am bringing a disc with my last MRI.  I wi

## 2020-01-22 DIAGNOSIS — M54.16 LUMBAR RADICULITIS: ICD-10-CM

## 2020-01-22 DIAGNOSIS — M47.816 LUMBAR SPONDYLOSIS: ICD-10-CM

## 2020-01-22 RX ORDER — BACLOFEN 10 MG/1
TABLET ORAL
Qty: 120 TABLET | Refills: 0 | Status: SHIPPED | OUTPATIENT
Start: 2020-01-22 | End: 2020-04-08 | Stop reason: ALTCHOICE

## 2020-01-22 NOTE — TELEPHONE ENCOUNTER
Medication: BACLOFEN 10 MG Oral Tab    Date of last refill: 12/26/19  [place]    Last office visit: 10/02/19  Due back to clinic per last office note: Not indicared  Date next office visit scheduled:  None    Last UR 0       Sig: TAKE 1 TO 2 TABLETS BY MOUTH EVERY 6 HOURS AS NEEDED         Radiology orders and consultations:None     The patient indicates understanding of these issues and agrees to the plan.   No follow-ups on file.  [address], MD, 10/2/2019, 6:

## 2020-01-28 ENCOUNTER — MED REC SCAN ONLY (OUTPATIENT)
Dept: FAMILY MEDICINE CLINIC | Facility: CLINIC | Age: 65
End: 2020-01-28

## 2020-02-03 RX ORDER — METOPROLOL SUCCINATE 50 MG/1
TABLET, EXTENDED RELEASE ORAL
Qty: 90 TABLET | Refills: 0 | Status: SHIPPED | OUTPATIENT
Start: 2020-02-03 | End: 2020-05-04

## 2020-02-03 NOTE — TELEPHONE ENCOUNTER
Medication(s) to Refill:   Requested Prescriptions     Pending Prescriptions Disp Refills   • METOPROLOL SUCCINATE ER 50 MG Oral Tablet 24 Hr [Pharmacy Med Name: METOPROLOL ER SUCCINATE 50MG TABS] 90 tablet 0     Sig: TAKE 1 TABLET BY MOUTH EVERY DAY

## 2020-02-26 ENCOUNTER — OFFICE VISIT (OUTPATIENT)
Dept: FAMILY MEDICINE CLINIC | Facility: CLINIC | Age: 65
End: 2020-02-26
Payer: COMMERCIAL

## 2020-02-26 VITALS
RESPIRATION RATE: 18 BRPM | SYSTOLIC BLOOD PRESSURE: 128 MMHG | HEIGHT: 67.2 IN | TEMPERATURE: 98 F | BODY MASS INDEX: 41.88 KG/M2 | DIASTOLIC BLOOD PRESSURE: 70 MMHG | WEIGHT: 270 LBS | HEART RATE: 68 BPM

## 2020-02-26 DIAGNOSIS — L03.116 BILATERAL LOWER LEG CELLULITIS: Primary | ICD-10-CM

## 2020-02-26 DIAGNOSIS — L03.115 BILATERAL LOWER LEG CELLULITIS: Primary | ICD-10-CM

## 2020-02-26 PROCEDURE — 99214 OFFICE O/P EST MOD 30 MIN: CPT | Performed by: NURSE PRACTITIONER

## 2020-02-26 RX ORDER — FLUCONAZOLE 150 MG/1
150 TABLET ORAL ONCE
Qty: 2 TABLET | Refills: 0 | Status: SHIPPED | OUTPATIENT
Start: 2020-02-26 | End: 2020-02-26

## 2020-02-26 RX ORDER — CEPHALEXIN 500 MG/1
500 CAPSULE ORAL 3 TIMES DAILY
Qty: 21 CAPSULE | Refills: 0 | Status: SHIPPED | OUTPATIENT
Start: 2020-02-26 | End: 2020-03-04

## 2020-02-26 NOTE — PATIENT INSTRUCTIONS
Discharge Instructions for Cellulitis  You have been diagnosed with cellulitis. This is an infection in the deepest layer of the skin and tissue beneath the skin. In some cases, the infection also affects the muscle. Cellulitis is caused by bacteria.  The · Vomiting  Date Last Reviewed: 8/1/2016  © 3996-9857 [employer] [address]. [address] [facility], [facility] [place]. All rights reserved. This information is not intended as a substitute for professional medical care.  Always follow your healthcare p

## 2020-02-26 NOTE — PROGRESS NOTES
No chief complaint on file. HPI:    Peyton Zhu is a 62year old female presents with erythema, increased warmth,some tenderness to the palpation over bilateral lower extremities. The current episode started in the past  1 week .  The problem has b History:   Diagnosis Date   • Arrhythmia     A-fib   • Back problem    • Cancer ([facility] [address] 2012    skin cancer removed  from leg   • Cellulitis     Rt. leg ankle   • Cellulitis    • Hip arthrosis    • Migraines    • Osteoarthrosis, unspecified whether generaliz Social History    Tobacco Use      Smoking status: Current Every Day Smoker        Packs/day: 0.25        Years: 32.00        Pack years: 8      Smokeless tobacco: Never Used      Tobacco comment: 1/2 pack daily    Alcohol use: No      Alcohol/week: 0.0 1 dose. Kerlix and Ace wrap applied. Keep   extremities elevated   Low salt diet   Continue diuretics as prescribed    Skin care d/w the pt. Rx as below. The patient indicates understanding of these issues and agrees to the plan.   The patient is asked

## 2020-02-28 ENCOUNTER — PATIENT MESSAGE (OUTPATIENT)
Dept: FAMILY MEDICINE CLINIC | Facility: CLINIC | Age: 65
End: 2020-02-28

## 2020-02-28 NOTE — TELEPHONE ENCOUNTER
From: Ranjeet Gonzalez  To: Kulwinder Douglas MD  Sent: 2/28/2020 11:57 AM CST  Subject: Visit Follow-up Question    Hi,  Just following up to see if the [place] papers I left for Dr. Salo Gerardo on 2/26 have been completed?  I had an office visit with Sharma Severin and I

## 2020-03-02 ENCOUNTER — MED REC SCAN ONLY (OUTPATIENT)
Dept: FAMILY MEDICINE CLINIC | Facility: CLINIC | Age: 65
End: 2020-03-02

## 2020-03-02 ENCOUNTER — TELEPHONE (OUTPATIENT)
Dept: FAMILY MEDICINE CLINIC | Facility: CLINIC | Age: 65
End: 2020-03-02

## 2020-03-02 RX ORDER — MELOXICAM 15 MG/1
TABLET ORAL
Qty: 15 TABLET | Refills: 1 | Status: SHIPPED | OUTPATIENT
Start: 2020-03-02 | End: 2020-04-29

## 2020-03-02 NOTE — TELEPHONE ENCOUNTER
Patient called back and gave authorization for her son Esha Bell to  her forms. Patient unable to walk.

## 2020-04-13 RX ORDER — POTASSIUM CHLORIDE 750 MG/1
TABLET, FILM COATED, EXTENDED RELEASE ORAL
Qty: 180 TABLET | Refills: 0 | Status: SHIPPED | OUTPATIENT
Start: 2020-04-13 | End: 2020-11-30

## 2020-04-29 DIAGNOSIS — M54.16 LUMBAR RADICULOPATHY: Primary | ICD-10-CM

## 2020-04-29 RX ORDER — MELOXICAM 15 MG/1
TABLET ORAL
Qty: 15 TABLET | Refills: 1 | Status: SHIPPED | OUTPATIENT
Start: 2020-04-29 | End: 2020-05-11

## 2020-04-29 NOTE — TELEPHONE ENCOUNTER
Medication(s) to Refill:   Requested Prescriptions     Pending Prescriptions Disp Refills   • Meloxicam 15 MG Oral Tab 15 tablet 1         Reason for Medication Refill being sent to Provider / Reason Protocol Failed:  [] 90 day refill has already been gran

## 2020-05-04 RX ORDER — METOPROLOL SUCCINATE 50 MG/1
TABLET, EXTENDED RELEASE ORAL
Qty: 90 TABLET | Refills: 0 | Status: SHIPPED | OUTPATIENT
Start: 2020-05-04 | End: 2020-08-03

## 2020-05-12 ENCOUNTER — PATIENT MESSAGE (OUTPATIENT)
Dept: FAMILY MEDICINE CLINIC | Facility: CLINIC | Age: 65
End: 2020-05-12

## 2020-05-13 ENCOUNTER — TELEMEDICINE (OUTPATIENT)
Dept: FAMILY MEDICINE CLINIC | Facility: CLINIC | Age: 65
End: 2020-05-13
Payer: COMMERCIAL

## 2020-05-13 ENCOUNTER — PATIENT MESSAGE (OUTPATIENT)
Dept: FAMILY MEDICINE CLINIC | Facility: CLINIC | Age: 65
End: 2020-05-13

## 2020-05-13 ENCOUNTER — TELEPHONE (OUTPATIENT)
Dept: FAMILY MEDICINE CLINIC | Facility: CLINIC | Age: 65
End: 2020-05-13

## 2020-05-13 DIAGNOSIS — M41.26 OTHER IDIOPATHIC SCOLIOSIS, LUMBAR REGION: Primary | ICD-10-CM

## 2020-05-13 DIAGNOSIS — M47.816 LUMBAR SPONDYLOSIS: ICD-10-CM

## 2020-05-13 PROCEDURE — 99214 OFFICE O/P EST MOD 30 MIN: CPT | Performed by: FAMILY MEDICINE

## 2020-05-13 NOTE — TELEPHONE ENCOUNTER
----- Message from Tye Hyman MD sent at 5/13/2020 11:32 AM CDT -----  She needs a letter that she needs scooter secondary to back issues, she has problems even with walking at home. ok for the letter she will call us where to fax it, thanks.

## 2020-05-13 NOTE — TELEPHONE ENCOUNTER
----- Message from Andrey Ramos MD sent at 5/13/2020 11:32 AM CDT -----  She needs a letter that she needs scooter secondary to back issues, she has problems even with walking at home. ok for the letter she will call us where to fax it, thanks.

## 2020-05-13 NOTE — TELEPHONE ENCOUNTER
Patient had a video visit today(5/13/20) with Dr Vahe Guerrier and Dr states she needs a letter of medical necessity for a motorized scooter faxed to [phone].  Please call patient with any questions at [phone]

## 2020-05-13 NOTE — TELEPHONE ENCOUNTER
From: Irina Murillo  To: Zully Shi MD  Sent: 5/12/2020 3:00 PM CDT  Subject: Visit Follow-up Question    I have requested doctors notes from Dr Jose Gordon office to be sent to Dr Sivakumar Abdi. Once she receives them can we set up a video or phone visit?   Thanks

## 2020-05-13 NOTE — PROGRESS NOTES
Peyton Zhu verbally consents to a Virtual/Telephone Check-In service on 05/13/20. Time spent:22 min. CC: back pain. HPI: Lower back has been hurting for few years, worsening, also pain in the \"all muscles\". Cramps  in character,  radiati SURGERY PROC UNLISTED  11/13/05    jorgeion, right foot hammertoe   • HERNIA SURGERY  2012    repair of umbilical hernia   • HIP TOTAL REPLACEMENT Left 5/8/2014    Performed by oYly Candelario MD at [facility] MAIN OR   • HYSTERECTOMY  1/1/00   • LUMBAR Jose Rosa normal voice  LUNGS: no SOB  PSYCH: normal mood. A/P  Diagnoses and all orders for this visit:    Other idiopathic scoliosis, lumbar region    Lumbar spondylosis        Rx for Scooter, pt will cont. F/u with Dr. Amy Albert and Dr. Tasia Albert.     F/u in [facility]

## 2020-05-18 RX ORDER — BUMETANIDE 2 MG/1
TABLET ORAL
Qty: 180 TABLET | Refills: 1 | Status: SHIPPED | OUTPATIENT
Start: 2020-05-18 | End: 2020-12-14

## 2020-05-19 ENCOUNTER — PATIENT MESSAGE (OUTPATIENT)
Dept: FAMILY MEDICINE CLINIC | Facility: CLINIC | Age: 65
End: 2020-05-19

## 2020-05-19 NOTE — TELEPHONE ENCOUNTER
From: Bartolo Lew  To: Nilton Gamino MD  Sent: 5/19/2020 12:22 PM CDT  Subject: Visit Follow-up Question    I need a form filled out in order to get an approval for a mobility scooter. Does Dr Farideh Arora need to see me in order to complete the form?   Thanks

## 2020-05-21 ENCOUNTER — TELEPHONE (OUTPATIENT)
Dept: FAMILY MEDICINE CLINIC | Facility: CLINIC | Age: 65
End: 2020-05-21

## 2020-05-21 NOTE — TELEPHONE ENCOUNTER
Patient's  dropped off forms for Dr. Meenu Carbajal to fill out. He stated he will come in to pick them up when they are ready. Paperwork placed in Dr. Shantelle Kaur folder.

## 2020-05-28 NOTE — TELEPHONE ENCOUNTER
Spoke with patient Re: Mobility wheel chair form completed by Dr. Rayleen Boxer copy to scan original placed at  for pt  the pt will call when arriving from car and request form.

## 2020-06-09 ENCOUNTER — PATIENT MESSAGE (OUTPATIENT)
Dept: FAMILY MEDICINE CLINIC | Facility: CLINIC | Age: 65
End: 2020-06-09

## 2020-06-09 NOTE — TELEPHONE ENCOUNTER
From: Gregg Hanson  To: Mariely Fregoso MD  Sent: 6/9/2020 11:10 AM CDT  Subject: Prescription Question    Good Morning,  Because of my hip replacement I need to pre-medicate before my dental appointments.  I also get yeast infection from antibiotics so

## 2020-06-10 RX ORDER — AMOXICILLIN 500 MG/1
2000 CAPSULE ORAL ONCE
Qty: 4 CAPSULE | Refills: 0 | Status: SHIPPED | OUTPATIENT
Start: 2020-06-10 | End: 2021-06-07

## 2020-06-10 RX ORDER — FLUCONAZOLE 150 MG/1
150 TABLET ORAL DAILY
Qty: 2 TABLET | Refills: 3 | Status: SHIPPED | OUTPATIENT
Start: 2020-06-10 | End: 2022-01-12

## 2020-06-10 NOTE — TELEPHONE ENCOUNTER
Mary Mendoza MD  You 20 hours ago (12:42 PM)      Rx Diflucan 150mg po qd for 2 days, 2 pills, 3 refills. Rx MOXICILLIN 500MG, 2 G PO TIMES ONE ONE HOUR BEFORE DENTAL APPT.     Routing comment

## 2020-07-01 ENCOUNTER — PATIENT MESSAGE (OUTPATIENT)
Dept: FAMILY MEDICINE CLINIC | Facility: CLINIC | Age: 65
End: 2020-07-01

## 2020-07-02 NOTE — TELEPHONE ENCOUNTER
From: Rodrigo Holt  To: Andrey Ramso MD  Sent: 7/1/2020 6:11 PM CDT  Subject: Other    I called insurance company regarding denial for [facility] , they appoved it today.

## 2020-08-03 RX ORDER — METOPROLOL SUCCINATE 50 MG/1
TABLET, EXTENDED RELEASE ORAL
Qty: 90 TABLET | Refills: 0 | Status: SHIPPED | OUTPATIENT
Start: 2020-08-03 | End: 2020-11-09

## 2020-08-11 ENCOUNTER — TELEPHONE (OUTPATIENT)
Dept: FAMILY MEDICINE CLINIC | Facility: CLINIC | Age: 65
End: 2020-08-11

## 2020-08-11 NOTE — TELEPHONE ENCOUNTER
Pt's  dropped off a Request for Certification the Americans with Disabilities Act form for completion. Copy made and filed in the black accordion, original forwarded to to Triage.

## 2020-08-14 NOTE — TELEPHONE ENCOUNTER
Form completed & placed at  for . Copy sent to scanning. Message sent to pt via [employer] it is ready for .

## 2020-08-17 ENCOUNTER — TELEPHONE (OUTPATIENT)
Dept: FAMILY MEDICINE CLINIC | Facility: CLINIC | Age: 65
End: 2020-08-17

## 2020-08-17 NOTE — TELEPHONE ENCOUNTER
Spouse Farzad Freeman picked up electric scooter forms. I spoke to pt and made sure it was ok since Spouse isn't on hipaa.

## 2020-09-01 ENCOUNTER — PATIENT MESSAGE (OUTPATIENT)
Dept: FAMILY MEDICINE CLINIC | Facility: CLINIC | Age: 65
End: 2020-09-01

## 2020-09-01 NOTE — TELEPHONE ENCOUNTER
From: Lauryn De  To: Mortimer Gosselin, APRN  Sent: 9/1/2020 7:22 AM CDT  Subject: Other    Did you receive paperwork I mailed to you a week ago? It was [facility] extension paperwork. I only have til 9-5. I mailed the paperwork to your attention.   Thanks   Ro

## 2020-09-02 ENCOUNTER — TELEPHONE (OUTPATIENT)
Dept: FAMILY MEDICINE CLINIC | Facility: CLINIC | Age: 65
End: 2020-09-02

## 2020-09-02 NOTE — TELEPHONE ENCOUNTER
I called Pt to confirm that it is OK to release the paperwork to her , Dimitrios Walker. Pt confirmed, ID verified.

## 2020-09-05 ENCOUNTER — LAB ENCOUNTER (OUTPATIENT)
Dept: LAB | Age: 65
End: 2020-09-05
Attending: INTERNAL MEDICINE
Payer: COMMERCIAL

## 2020-09-05 DIAGNOSIS — E03.9 HYPOTHYROIDISM, UNSPECIFIED TYPE: ICD-10-CM

## 2020-09-05 LAB
ALBUMIN SERPL-MCNC: 3.8 G/DL (ref 3.4–5)
ALBUMIN/GLOB SERPL: 1.1 {RATIO} (ref 1–2)
ALP LIVER SERPL-CCNC: 81 U/L (ref 50–130)
ALT SERPL-CCNC: 20 U/L (ref 13–56)
ANION GAP SERPL CALC-SCNC: 5 MMOL/L (ref 0–18)
AST SERPL-CCNC: 14 U/L (ref 15–37)
BILIRUB SERPL-MCNC: 0.7 MG/DL (ref 0.1–2)
BUN BLD-MCNC: 27 MG/DL (ref 7–18)
BUN/CREAT SERPL: 26.5 (ref 10–20)
CALCIUM BLD-MCNC: 9.7 MG/DL (ref 8.5–10.1)
CHLORIDE SERPL-SCNC: 98 MMOL/L (ref 98–112)
CO2 SERPL-SCNC: 36 MMOL/L (ref 21–32)
CREAT BLD-MCNC: 1.02 MG/DL (ref 0.55–1.02)
GLOBULIN PLAS-MCNC: 3.5 G/DL (ref 2.8–4.4)
GLUCOSE BLD-MCNC: 84 MG/DL (ref 70–99)
M PROTEIN MFR SERPL ELPH: 7.3 G/DL (ref 6.4–8.2)
OSMOLALITY SERPL CALC.SUM OF ELEC: 292 MOSM/KG (ref 275–295)
PATIENT FASTING Y/N/NP: NO
POTASSIUM SERPL-SCNC: 3 MMOL/L (ref 3.5–5.1)
SODIUM SERPL-SCNC: 139 MMOL/L (ref 136–145)
T4 FREE SERPL-MCNC: 1.7 NG/DL (ref 0.8–1.7)
TSI SER-ACNC: 0.23 MIU/ML (ref 0.36–3.74)

## 2020-09-05 PROCEDURE — 80053 COMPREHEN METABOLIC PANEL: CPT

## 2020-09-05 PROCEDURE — 84439 ASSAY OF FREE THYROXINE: CPT

## 2020-09-05 PROCEDURE — 36415 COLL VENOUS BLD VENIPUNCTURE: CPT

## 2020-09-05 PROCEDURE — 84443 ASSAY THYROID STIM HORMONE: CPT

## 2020-11-09 DIAGNOSIS — E78.00 PURE HYPERCHOLESTEROLEMIA: ICD-10-CM

## 2020-11-09 RX ORDER — METOPROLOL SUCCINATE 50 MG/1
TABLET, EXTENDED RELEASE ORAL
Qty: 90 TABLET | Refills: 0 | Status: SHIPPED | OUTPATIENT
Start: 2020-11-09 | End: 2021-02-08

## 2020-11-09 NOTE — TELEPHONE ENCOUNTER
Medication(s) to Refill:   Requested Prescriptions     Pending Prescriptions Disp Refills   • Pravastatin Sodium 20 MG Oral Tab 90 tablet 4     Sig: TAKE 1 TABLET BY MOUTH EVERY EVENING         Reason for Medication Refill being sent to Provider / Reason P

## 2020-11-10 RX ORDER — PRAVASTATIN SODIUM 20 MG
TABLET ORAL
Qty: 90 TABLET | Refills: 4 | Status: SHIPPED | OUTPATIENT
Start: 2020-11-10 | End: 2021-12-08

## 2020-11-17 ENCOUNTER — VIRTUAL PHONE E/M (OUTPATIENT)
Dept: FAMILY MEDICINE CLINIC | Facility: CLINIC | Age: 65
End: 2020-11-17
Payer: COMMERCIAL

## 2020-11-17 DIAGNOSIS — Z12.31 VISIT FOR SCREENING MAMMOGRAM: ICD-10-CM

## 2020-11-17 DIAGNOSIS — Z91.89 AT RISK FOR OSTEOPOROSIS: Primary | ICD-10-CM

## 2020-11-17 DIAGNOSIS — T07.XXXA MULTIPLE FRACTURES: ICD-10-CM

## 2020-11-17 PROCEDURE — 99214 OFFICE O/P EST MOD 30 MIN: CPT | Performed by: FAMILY MEDICINE

## 2020-11-17 RX ORDER — PREDNISONE 20 MG/1
20 TABLET ORAL DAILY
Qty: 20 TABLET | Refills: 1 | Status: SHIPPED | OUTPATIENT
Start: 2020-11-17 | End: 2021-02-01 | Stop reason: ALTCHOICE

## 2020-11-17 NOTE — PROGRESS NOTES
CC:fractures,edema. HPI:      Bilateral legs swelling, feeling of the heaviness, water retention, last few years, worse when pt is on her feet and better in the morning. Mild to moderate. No SOB, no chest pains, no cramps.   H/o multiple fractures, clavi Osteoarthrosis, unspecified whether generalized or localized, unspecified site    • PONV (postoperative nausea and vomiting)    • Unspecified disorder of thyroid     Hashimoto's    • Unspecified sleep apnea    • Visual impairment       Past Surgical Histor comment: 1/2 pack daily    Alcohol use: No      Alcohol/week: 0.0 standard drinks      Comment: rare    Drug use: No          ROS:  GEN: no loosing height, no fatigue  CHEST: no chest pains. SKIN: no rashes  HEM: no ecchymoses  JOINTS: no  joints pain. No

## 2020-11-27 ENCOUNTER — HOSPITAL ENCOUNTER (OUTPATIENT)
Dept: BONE DENSITY | Age: 65
Discharge: HOME | End: 2020-11-27
Attending: FAMILY MEDICINE
Payer: COMMERCIAL

## 2020-11-27 DIAGNOSIS — T07.XXXA MULTIPLE FRACTURES: ICD-10-CM

## 2020-11-27 DIAGNOSIS — Z91.89 AT RISK FOR OSTEOPOROSIS: ICD-10-CM

## 2020-11-27 PROCEDURE — 77080 DXA BONE DENSITY AXIAL: CPT | Performed by: FAMILY MEDICINE

## 2020-11-30 ENCOUNTER — TELEPHONE (OUTPATIENT)
Dept: FAMILY MEDICINE CLINIC | Facility: CLINIC | Age: 65
End: 2020-11-30

## 2020-11-30 RX ORDER — POTASSIUM CHLORIDE 750 MG/1
10 TABLET, FILM COATED, EXTENDED RELEASE ORAL 2 TIMES DAILY
Qty: 180 TABLET | Refills: 0 | Status: SHIPPED | OUTPATIENT
Start: 2020-11-30 | End: 2021-03-01

## 2020-11-30 NOTE — TELEPHONE ENCOUNTER
Medication(s) to Refill:   Requested Prescriptions     Pending Prescriptions Disp Refills   • Potassium Chloride ER 10 MEQ Oral Tab  tablet 0     Sig: Take 1 tablet (10 mEq total) by mouth 2 (two) times daily.          Reason for Medication Refill adrien

## 2020-11-30 NOTE — TELEPHONE ENCOUNTER
----- Message from Jason Gautam MD sent at 11/30/2020  7:51 AM CST -----  Cont. Vit. D, staying active, high yogurt, milk diet. This most likely represents a vascular necrosis. This needs to be seen by Dr. Nuha Walker.   Dr Kate Escudero

## 2020-12-01 NOTE — TELEPHONE ENCOUNTER
Pt notified of bone scan results & below orders. Pt requests Dr. Tristen Patel info be sent to her via [address] [place] [address] [address]. Pt sees Dr. Shameka De La Torre for pain, she will discuss results with him also to get his opinion. All questions answered, pt expresses understanding.

## 2020-12-14 RX ORDER — BUMETANIDE 2 MG/1
4 TABLET ORAL EVERY MORNING
Qty: 180 TABLET | Refills: 1 | Status: SHIPPED | OUTPATIENT
Start: 2020-12-14 | End: 2021-07-06

## 2020-12-14 NOTE — TELEPHONE ENCOUNTER
Medication(s) to Refill:   Requested Prescriptions     Pending Prescriptions Disp Refills   • bumetanide 2 MG Oral Tab 180 tablet 1     Sig: Take 2 tablets (4 mg total) by mouth every morning.          Reason for Medication Refill being sent to Provider / Rimma Johnson

## 2021-01-09 ENCOUNTER — LAB ENCOUNTER (OUTPATIENT)
Dept: LAB | Age: 66
End: 2021-01-09
Attending: FAMILY MEDICINE
Payer: COMMERCIAL

## 2021-01-09 DIAGNOSIS — G47.33 OBSTRUCTIVE SLEEP APNEA (ADULT) (PEDIATRIC): ICD-10-CM

## 2021-01-09 DIAGNOSIS — I10 ESSENTIAL HYPERTENSION, BENIGN: ICD-10-CM

## 2021-01-09 DIAGNOSIS — E06.3 CHRONIC LYMPHOCYTIC THYROIDITIS: ICD-10-CM

## 2021-01-09 DIAGNOSIS — Z86.018 HISTORY OF PITUITARY ADENOMA: ICD-10-CM

## 2021-01-09 DIAGNOSIS — E03.9 ACQUIRED HYPOTHYROIDISM: Chronic | ICD-10-CM

## 2021-01-09 LAB
ALBUMIN SERPL-MCNC: 3.6 G/DL (ref 3.4–5)
ALBUMIN/GLOB SERPL: 1 {RATIO} (ref 1–2)
ALP LIVER SERPL-CCNC: 95 U/L
ALT SERPL-CCNC: 24 U/L
ANION GAP SERPL CALC-SCNC: 5 MMOL/L (ref 0–18)
AST SERPL-CCNC: 20 U/L (ref 15–37)
BILIRUB SERPL-MCNC: 0.6 MG/DL (ref 0.1–2)
BUN BLD-MCNC: 24 MG/DL (ref 7–18)
BUN/CREAT SERPL: 25.5 (ref 10–20)
CALCIUM BLD-MCNC: 9.4 MG/DL (ref 8.5–10.1)
CHLORIDE SERPL-SCNC: 105 MMOL/L (ref 98–112)
CO2 SERPL-SCNC: 32 MMOL/L (ref 21–32)
CREAT BLD-MCNC: 0.94 MG/DL
GLOBULIN PLAS-MCNC: 3.6 G/DL (ref 2.8–4.4)
GLUCOSE BLD-MCNC: 82 MG/DL (ref 70–99)
M PROTEIN MFR SERPL ELPH: 7.2 G/DL (ref 6.4–8.2)
OSMOLALITY SERPL CALC.SUM OF ELEC: 297 MOSM/KG (ref 275–295)
PATIENT FASTING Y/N/NP: NO
POTASSIUM SERPL-SCNC: 4.1 MMOL/L (ref 3.5–5.1)
SODIUM SERPL-SCNC: 142 MMOL/L (ref 136–145)
T4 FREE SERPL-MCNC: 1.3 NG/DL (ref 0.8–1.7)
TSI SER-ACNC: 2.95 MIU/ML (ref 0.36–3.74)
VIT D+METAB SERPL-MCNC: 40.6 NG/ML (ref 30–100)

## 2021-01-09 PROCEDURE — 36415 COLL VENOUS BLD VENIPUNCTURE: CPT

## 2021-01-09 PROCEDURE — 80053 COMPREHEN METABOLIC PANEL: CPT

## 2021-01-09 PROCEDURE — 84443 ASSAY THYROID STIM HORMONE: CPT

## 2021-01-09 PROCEDURE — 84439 ASSAY OF FREE THYROXINE: CPT

## 2021-01-09 PROCEDURE — 82306 VITAMIN D 25 HYDROXY: CPT

## 2021-01-12 PROBLEM — M85.89 OSTEOPENIA OF MULTIPLE SITES: Status: ACTIVE | Noted: 2021-01-12

## 2021-02-08 RX ORDER — METOPROLOL SUCCINATE 50 MG/1
TABLET, EXTENDED RELEASE ORAL
Qty: 90 TABLET | Refills: 0 | Status: SHIPPED | OUTPATIENT
Start: 2021-02-08 | End: 2021-05-03

## 2021-02-22 ENCOUNTER — OFFICE VISIT (OUTPATIENT)
Dept: FAMILY MEDICINE CLINIC | Facility: CLINIC | Age: 66
End: 2021-02-22
Payer: COMMERCIAL

## 2021-02-22 VITALS
TEMPERATURE: 98 F | DIASTOLIC BLOOD PRESSURE: 80 MMHG | RESPIRATION RATE: 18 BRPM | WEIGHT: 277 LBS | BODY MASS INDEX: 43.47 KG/M2 | HEART RATE: 75 BPM | SYSTOLIC BLOOD PRESSURE: 134 MMHG | HEIGHT: 67 IN | OXYGEN SATURATION: 98 %

## 2021-02-22 DIAGNOSIS — S81.802A WOUND OF LEFT LOWER EXTREMITY, INITIAL ENCOUNTER: ICD-10-CM

## 2021-02-22 DIAGNOSIS — M16.11 ARTHRITIS OF RIGHT HIP: ICD-10-CM

## 2021-02-22 DIAGNOSIS — Z00.00 ROUTINE GENERAL MEDICAL EXAMINATION AT A HEALTH CARE FACILITY: Primary | ICD-10-CM

## 2021-02-22 DIAGNOSIS — L03.115 CELLULITIS OF RIGHT LOWER EXTREMITY: ICD-10-CM

## 2021-02-22 DIAGNOSIS — G89.29 CHRONIC BILATERAL LOW BACK PAIN WITHOUT SCIATICA: ICD-10-CM

## 2021-02-22 DIAGNOSIS — Z13.21 SCREENING FOR ENDOCRINE, NUTRITIONAL, METABOLIC AND IMMUNITY DISORDER: ICD-10-CM

## 2021-02-22 DIAGNOSIS — F17.200 SMOKING: ICD-10-CM

## 2021-02-22 DIAGNOSIS — Z13.0 SCREENING FOR ENDOCRINE, NUTRITIONAL, METABOLIC AND IMMUNITY DISORDER: ICD-10-CM

## 2021-02-22 DIAGNOSIS — R60.9 CHRONIC EDEMA: ICD-10-CM

## 2021-02-22 DIAGNOSIS — E66.01 MORBID OBESITY (HCC): ICD-10-CM

## 2021-02-22 DIAGNOSIS — M85.852 OSTEOPENIA OF LEFT HIP: ICD-10-CM

## 2021-02-22 DIAGNOSIS — M54.50 CHRONIC BILATERAL LOW BACK PAIN WITHOUT SCIATICA: ICD-10-CM

## 2021-02-22 DIAGNOSIS — Z13.228 SCREENING FOR ENDOCRINE, NUTRITIONAL, METABOLIC AND IMMUNITY DISORDER: ICD-10-CM

## 2021-02-22 DIAGNOSIS — Z13.29 SCREENING FOR ENDOCRINE, NUTRITIONAL, METABOLIC AND IMMUNITY DISORDER: ICD-10-CM

## 2021-02-22 PROBLEM — IMO0001 SMOKING: Status: ACTIVE | Noted: 2021-02-22

## 2021-02-22 PROCEDURE — 99396 PREV VISIT EST AGE 40-64: CPT | Performed by: FAMILY MEDICINE

## 2021-02-22 PROCEDURE — 3075F SYST BP GE 130 - 139MM HG: CPT | Performed by: FAMILY MEDICINE

## 2021-02-22 PROCEDURE — 3008F BODY MASS INDEX DOCD: CPT | Performed by: FAMILY MEDICINE

## 2021-02-22 PROCEDURE — 99214 OFFICE O/P EST MOD 30 MIN: CPT | Performed by: FAMILY MEDICINE

## 2021-02-22 PROCEDURE — 3079F DIAST BP 80-89 MM HG: CPT | Performed by: FAMILY MEDICINE

## 2021-02-22 RX ORDER — FLUCONAZOLE 150 MG/1
TABLET ORAL
Refills: 0 | OUTPATIENT
Start: 2021-02-22

## 2021-02-22 RX ORDER — ANORECTAL OINTMENT 15.7; .44; 24; 20.6 G/100G; G/100G; G/100G; G/100G
1 OINTMENT TOPICAL 3 TIMES DAILY
Qty: 1 TUBE | Refills: 3 | Status: SHIPPED | OUTPATIENT
Start: 2021-02-22 | End: 2021-08-16 | Stop reason: ALTCHOICE

## 2021-02-22 RX ORDER — LEVOFLOXACIN 500 MG/1
500 TABLET, FILM COATED ORAL DAILY
Qty: 7 TABLET | Refills: 0 | Status: SHIPPED | OUTPATIENT
Start: 2021-02-22 | End: 2021-08-16 | Stop reason: ALTCHOICE

## 2021-02-22 NOTE — PROGRESS NOTES
Beena Ruiz is a 60year old female who presents for a complete physical exam, no gyn. Cellulitis: bilateral leg  HPI:             Patient has multiple medical problems, dental visit up to date, no hearing problem. Vaccinations: pt refuses.     Pt has tablet 0   • ibuprofen 800 MG Oral Tab Take 1 tablet (800 mg total) by mouth every 8 (eight) hours as needed (with food).  90 tablet 1   • fluconazole 150 MG Oral Tab      • Pravastatin Sodium 20 MG Oral Tab TAKE 1 TABLET BY MOUTH EVERY EVENING 90 tablet 4 3/5/2019    Performed by Ivania Gastelum MD at [facility] ENDOSCOPY   • TRANSFORAMINAL LUMBAR EPIDURAL STEROID INJECTION MULTIPLE LEVEL Left 12/7/2015    Performed by Ivania Gastelum MD at [facility] MAIN OR   • UPPER ARM/ELBOW SURGERY UNLISTED  1/1/96    left      Fa gallops  Abdomen: is soft,NBS, NT/ND with no HSM. No rebound or guarding. No CVA tenderness, no hernias. Neuro: Cranial nerves II-XII normal,no focal abnormalities, and reflexes coordination and gait normal and symmetric. Sensation intact.   Extremities: a (three) times daily. -     levofloxacin (LEVAQUIN) 500 MG Oral Tab; Take 1 tablet (500 mg total) by mouth daily. low sugar, low salt and  lipid diet, exercise 3 times a week d/w the pt.       Time: I spent 30 minutes preparing to see patient (including

## 2021-02-23 ENCOUNTER — PATIENT MESSAGE (OUTPATIENT)
Dept: FAMILY MEDICINE CLINIC | Facility: CLINIC | Age: 66
End: 2021-02-23

## 2021-02-23 ENCOUNTER — MED REC SCAN ONLY (OUTPATIENT)
Dept: FAMILY MEDICINE CLINIC | Facility: CLINIC | Age: 66
End: 2021-02-23

## 2021-03-01 RX ORDER — POTASSIUM CHLORIDE 750 MG/1
TABLET, FILM COATED, EXTENDED RELEASE ORAL
Qty: 180 TABLET | Refills: 1 | Status: SHIPPED | OUTPATIENT
Start: 2021-03-01 | End: 2021-08-30

## 2021-03-01 NOTE — TELEPHONE ENCOUNTER
Medication(s) to Refill:   Requested Prescriptions     Pending Prescriptions Disp Refills   • POTASSIUM CHLORIDE ER 10 MEQ Oral Tab CR [Pharmacy Med Name: POTASSIUM CL 10MEQ ER TABLETS] 180 tablet 0     Sig: TAKE 1 TABLET(10 MEQ) BY MOUTH TWICE DAILY

## 2021-03-09 ENCOUNTER — TELEPHONE (OUTPATIENT)
Dept: ORTHOPEDICS CLINIC | Facility: CLINIC | Age: 66
End: 2021-03-09

## 2021-03-09 ENCOUNTER — MED REC SCAN ONLY (OUTPATIENT)
Dept: FAMILY MEDICINE CLINIC | Facility: CLINIC | Age: 66
End: 2021-03-09

## 2021-03-09 DIAGNOSIS — M25.551 RIGHT HIP PAIN: Primary | ICD-10-CM

## 2021-03-10 ENCOUNTER — HOSPITAL ENCOUNTER (OUTPATIENT)
Dept: GENERAL RADIOLOGY | Age: 66
Discharge: HOME | End: 2021-03-10
Attending: ORTHOPAEDIC SURGERY
Payer: COMMERCIAL

## 2021-03-10 ENCOUNTER — OFFICE VISIT (OUTPATIENT)
Dept: ORTHOPEDICS CLINIC | Facility: CLINIC | Age: 66
End: 2021-03-10
Payer: COMMERCIAL

## 2021-03-10 VITALS — OXYGEN SATURATION: 98 % | HEART RATE: 70 BPM

## 2021-03-10 DIAGNOSIS — M16.11 PRIMARY OSTEOARTHRITIS OF RIGHT HIP: Primary | ICD-10-CM

## 2021-03-10 DIAGNOSIS — M25.551 RIGHT HIP PAIN: ICD-10-CM

## 2021-03-10 PROCEDURE — 99203 OFFICE O/P NEW LOW 30 MIN: CPT | Performed by: ORTHOPAEDIC SURGERY

## 2021-03-10 PROCEDURE — 73502 X-RAY EXAM HIP UNI 2-3 VIEWS: CPT | Performed by: ORTHOPAEDIC SURGERY

## 2021-03-10 NOTE — H&P
[facility] New Patient Note    CC: Patient presents with:  Hip Pain: right hip pain       HPI: This 60year old female presents today with complaints of right hip pain. Patient reports this pain has been going on for years.   Pain is around the thigh removed from pituatary (brain)   • SACROILIAC JOINT INJECTION BILATERAL Bilateral 1/8/2019    Performed by Selene Murphy MD at [facility] MAIN OR   • [address] Bilateral 4/14/2016    Performed by Selene Murphy MD at [address] [place] [address] • HYDROcodone-acetaminophen  MG Oral Tab 1 tab po q4 hr prn-30 days 180 tablet 0       Adhesive Tape           RASH, ITCHING  Grass                   ITCHING, OTHER (SEE COMMENTS)    Comment:Headaches with blood shot eyes  Latex                   I right hip and pelvis personally viewed, independently interpreted and radiology report read.   Demonstrates level pelvis, end-stage erosive osteoarthritis of the right hip potentially secondary to avascular necrosis, with collapse and erosion of the femoral weight loss. For the wound, she has been following with wound care and notes that this has been improving. She will continue with this management.   For smoking, we did discuss medical and behavioral intervention having highest success rates, and did prov agreement with this thorough discussion. I advised her that I am going to forward my evaluation to her primary care team, and she will continue with the above wound care, smoking cessation, weight loss clinic.   Questions were answered, she understands and

## 2021-03-10 NOTE — PATIENT INSTRUCTIONS
What Is Arthritis? Arthritis is a disease that affects the joints. Joints are the parts where bones meet and move. It can affect any joint in your body. There are many types of arthritis.  They include:   · Osteoarthritis  · Rheumatoid arthritis  · Gout  · The joint's range of motion can become limited. Symptoms  Arthritis can affect any joint. Weight-bearing joints, such as the hips and knees, are often affected. Common symptoms are joint pain and stiffness.  Pain and stiffness may get worse with inactivi pounds. Losing one single pound takes multiple pounds of pressure off the joints. Losing 10 pounds can take 50 pounds of pressure off the joints. The tips below may help:  · Start a weight-loss program with the help of your healthcare provider.   · Ask your exercise part of your life. Talk with your healthcare provider about what is safe for you. Make sure you:  · Choose exercises that improve joint motion and make your muscles stronger. Learn how to do exercises correctly and safely.  Consider talking with a Isometric exercises are done by tightening the muscles without moving the joint. This may be a good way to strengthen the muscles around a stiff joint. · Avoid deep flexion or deep squatting (do not bend the knee more than 90 degrees).   · Focus on closed- flexibility activities such as yoga and fercho chi may improve pain and joint motion.  You might try:  · Yoga, including chair yoga, helps to keep your joints strong and flexible  · Fercho Chi, an ancient type of exercise with slow, gentle movements  · Water exer provider to help reduce your pain and improve joint mobility.  Treatments may include:  · Topical medicines such as lidocaine, capsaicin, and diclofenac gel  · Oral medicines such as acetaminophen or NSAIDs (nonsteroidal anti-inflammatory drugs) such as ibu ceramic, or plastic. This is most often done with the knee or hip joint. · Other surgery. There are other surgical procedures specific to certain joints.       Get help and more information  · For more information on arthritis go to the Arthritis formerly [facility] without smoking, your risk goes down even more. Quitting isn’t easy, but millions of people have done it. You can, too. It’s never too late to quit. You may have tried to quit before, but don’t give up. Try again.  Many smokers try 4 or 5 times before they fills your body with chemicals, such as nicotine and tar. Here’s how these things affect your body:  · Smoke. Cigarette smoke contains carbon monoxide. This gas takes the place of oxygen in your blood. · Nicotine.  This drug raises your blood pressure and quitting can help you get through them. Get to know others in the class. And support each other beyond the class. · Phone counseling also helps you keep on track.  Ask your healthcare provider, local hospital, or public health department to put you in touc to see how you’re doing. Telephone counseling can also help you keep on track. Ask your healthcare provider, local hospital, or public health department to put you in touch with a phone counselor.  You may also have to deal with doubters when you decide to substances that can cause cancer. E-cigarettes are not well regulated. They have not been studied enough to know if they are a good aid to help you stop smoking. Talk with your healthcare provider before using these products.     Tips for quitting successfu How I'll handle it   Finishing a meal Get up from the table and take a walk   Having an argument Find a quiet place and breathe deeply   Feeling lonely or bored Call a friend to talk     How to cope with your triggers  · Change the habits that lead you to available over-the-counter or by prescription. Ask your healthcare provider if any of these could help you quit smoking. Staying Smoke-Free     Deep breathing can help ease the urge to smoke. Quitting smoking is a big change.  People will congratul smoking cessation program. Program combining behavioral and medical therapy with MOISE Guzman. Call [facility] at [phone] [facility]) to schedule. · American Lung Association Freedom From Smoking program. Small group setting.  For informat

## 2021-03-18 DIAGNOSIS — Z23 NEED FOR VACCINATION: ICD-10-CM

## 2021-03-29 NOTE — PROGRESS NOTES
Chief complain: lumbar pain, smoking. HPI: Lower back back has been hurting for yearss . Dull  in character, no radiation. Pt has severe scoliosis, will try to see specialist in [facility]. Pt would like to quit smoking, smokes all her life.   Current Back problem    [facility]    skin cancer removed  from leg   • Cellulitis     Rt. leg ankle   • Cellulitis    • Hip arthrosis    • Migraines    • Osteoarthrosis, unspecified whether generalized or localized, unspecified site    • PONV (postoperat 8      Smokeless tobacco: Never Used      Tobacco comment: 1/2 pack daily    Vaping Use      Vaping Use: Never used    Alcohol use: No      Alcohol/week: 0.0 standard drinks      Comment: rare    Drug use: No            ROS:  GEN: no fever, no chills, no f

## 2021-05-03 RX ORDER — METOPROLOL SUCCINATE 50 MG/1
TABLET, EXTENDED RELEASE ORAL
Qty: 90 TABLET | Refills: 0 | Status: SHIPPED | OUTPATIENT
Start: 2021-05-03 | End: 2021-08-02

## 2021-05-20 ENCOUNTER — TELEPHONE (OUTPATIENT)
Dept: FAMILY MEDICINE CLINIC | Facility: CLINIC | Age: 66
End: 2021-05-20

## 2021-05-20 NOTE — TELEPHONE ENCOUNTER
Pt's spouse dropped off forms. Spouse didn't know much about the forms. I advised that pt will need an office visit for these forms to be completed. Spouse stated PT and Dr have already talked about this.   Original placed in triage and copy placed in fax r

## 2021-05-20 NOTE — TELEPHONE ENCOUNTER
Pt brought in FMLA to be completed for back pain stating you agreed to complete it for her. LOV 3/29, pt saw Spine doctor at [facility] on 5/17.   Did you agree to complete FMLA for pt?

## 2021-05-22 ENCOUNTER — MED REC SCAN ONLY (OUTPATIENT)
Dept: FAMILY MEDICINE CLINIC | Facility: CLINIC | Age: 66
End: 2021-05-22

## 2021-05-25 NOTE — TELEPHONE ENCOUNTER
I spoke with pt regarding Disability paperwork, Pt would like call on her cell phone when papers are ready for .

## 2021-05-26 NOTE — TELEPHONE ENCOUNTER
Form completed and signed by provider. Original placed at  for pickup, copy sent to scanning and holding copy in triage. Called patient and spoke with her. Advised her of this information. She states understanding.   She also states that he

## 2021-06-07 RX ORDER — AMOXICILLIN 500 MG/1
CAPSULE ORAL
Qty: 4 CAPSULE | Refills: 0 | Status: SHIPPED | OUTPATIENT
Start: 2021-06-07 | End: 2021-08-16 | Stop reason: ALTCHOICE

## 2021-07-06 RX ORDER — BUMETANIDE 2 MG/1
TABLET ORAL
Qty: 180 TABLET | Refills: 1 | Status: SHIPPED | OUTPATIENT
Start: 2021-07-06 | End: 2022-01-03

## 2021-08-02 RX ORDER — METOPROLOL SUCCINATE 50 MG/1
TABLET, EXTENDED RELEASE ORAL
Qty: 90 TABLET | Refills: 0 | Status: SHIPPED | OUTPATIENT
Start: 2021-08-02 | End: 2021-11-01

## 2021-08-09 ENCOUNTER — TELEPHONE (OUTPATIENT)
Dept: FAMILY MEDICINE CLINIC | Facility: CLINIC | Age: 66
End: 2021-08-09

## 2021-08-09 NOTE — TELEPHONE ENCOUNTER
Notified the patient of the need for OV. Patient verbalized understanding and agrees to OV. OV scheduled for 8/16 for [place] paperwork.

## 2021-08-09 NOTE — TELEPHONE ENCOUNTER
Patient's spouse came to office to drop off FMLA paperwork. He was advised that since patient's last OV with provider was on 3-29-21 an office visit would most likely be necessary in order to complete paperwork.   He replied \"I don't think so, she drops t

## 2021-08-16 ENCOUNTER — OFFICE VISIT (OUTPATIENT)
Dept: FAMILY MEDICINE CLINIC | Facility: CLINIC | Age: 66
End: 2021-08-16
Payer: COMMERCIAL

## 2021-08-16 VITALS
SYSTOLIC BLOOD PRESSURE: 132 MMHG | HEART RATE: 63 BPM | DIASTOLIC BLOOD PRESSURE: 82 MMHG | OXYGEN SATURATION: 97 % | WEIGHT: 249 LBS | BODY MASS INDEX: 39.08 KG/M2 | HEIGHT: 67 IN | RESPIRATION RATE: 18 BRPM

## 2021-08-16 DIAGNOSIS — M16.11 ARTHRITIS OF RIGHT HIP: Primary | ICD-10-CM

## 2021-08-16 DIAGNOSIS — E66.01 SEVERE OBESITY (BMI 35.0-35.9 WITH COMORBIDITY) (HCC): ICD-10-CM

## 2021-08-16 PROCEDURE — 99214 OFFICE O/P EST MOD 30 MIN: CPT | Performed by: FAMILY MEDICINE

## 2021-08-16 PROCEDURE — 3075F SYST BP GE 130 - 139MM HG: CPT | Performed by: FAMILY MEDICINE

## 2021-08-16 PROCEDURE — 3079F DIAST BP 80-89 MM HG: CPT | Performed by: FAMILY MEDICINE

## 2021-08-16 PROCEDURE — 3008F BODY MASS INDEX DOCD: CPT | Performed by: FAMILY MEDICINE

## 2021-08-16 NOTE — PROGRESS NOTES
Chestine Lanes verbally consents to a Virtual/Telephone Check-In service on 05/13/20. Time spent:22 min. CC: back pain. HPI: R hip has been hurting for few years, worsening, also pain in the \"all muscles\". Cramps  in character,  radiation -t Procedure Laterality Date   • FOOT/TOES SURGERY PROC UNLISTED  11/13/05    bunion, right foot hammertoe   • HERNIA SURGERY  2012    repair of umbilical hernia   • HYSTERECTOMY  1/1/00   • OTHER SURGICAL HISTORY  9/2010    tumor removed from pituatary (br

## 2021-08-16 NOTE — TELEPHONE ENCOUNTER
ANASTASIALA paperwork complete. Called pt to let her know. She will come pick-up at front tomorrow. Copy sent to scan and copy kept in back.

## 2021-08-30 RX ORDER — POTASSIUM CHLORIDE 750 MG/1
TABLET, FILM COATED, EXTENDED RELEASE ORAL
Qty: 180 TABLET | Refills: 1 | Status: SHIPPED | OUTPATIENT
Start: 2021-08-30

## 2021-11-01 RX ORDER — METOPROLOL SUCCINATE 50 MG/1
TABLET, EXTENDED RELEASE ORAL
Qty: 90 TABLET | Refills: 0 | Status: SHIPPED | OUTPATIENT
Start: 2021-11-01 | End: 2022-01-31

## 2021-12-08 ENCOUNTER — PATIENT MESSAGE (OUTPATIENT)
Dept: FAMILY MEDICINE CLINIC | Facility: CLINIC | Age: 66
End: 2021-12-08

## 2021-12-08 ENCOUNTER — TELEPHONE (OUTPATIENT)
Dept: FAMILY MEDICINE CLINIC | Facility: CLINIC | Age: 66
End: 2021-12-08

## 2021-12-08 DIAGNOSIS — E78.00 PURE HYPERCHOLESTEROLEMIA: ICD-10-CM

## 2021-12-08 RX ORDER — PRAVASTATIN SODIUM 20 MG
TABLET ORAL
Qty: 30 TABLET | Refills: 0 | Status: SHIPPED | OUTPATIENT
Start: 2021-12-08 | End: 2022-01-07

## 2021-12-08 NOTE — TELEPHONE ENCOUNTER
Current Outpatient Medications:     •  Pravastatin Sodium 20 MG Oral Tab, TAKE 1 TABLET BY MOUTH EVERY EVENING, Disp: 90 tablet, Rfl: 4    LOV 8/16/21

## 2021-12-09 ENCOUNTER — PATIENT MESSAGE (OUTPATIENT)
Dept: FAMILY MEDICINE CLINIC | Facility: CLINIC | Age: 66
End: 2021-12-09

## 2022-01-03 RX ORDER — BUMETANIDE 2 MG/1
TABLET ORAL
Qty: 180 TABLET | Refills: 1 | Status: SHIPPED | OUTPATIENT
Start: 2022-01-03

## 2022-01-03 NOTE — TELEPHONE ENCOUNTER
Medication(s) to Refill:   Requested Prescriptions     Pending Prescriptions Disp Refills   • BUMETANIDE 2 MG Oral Tab [Pharmacy Med Name: BUMETANIDE 2MG TABLETS] 180 tablet 1     Sig: TAKE 2 TABLETS(4 MG) BY MOUTH EVERY MORNING         Reason for [address]

## 2022-01-05 ENCOUNTER — LABORATORY ENCOUNTER (OUTPATIENT)
Dept: LAB | Age: 67
End: 2022-01-05
Attending: FAMILY MEDICINE
Payer: COMMERCIAL

## 2022-01-05 DIAGNOSIS — Z13.21 SCREENING FOR ENDOCRINE, NUTRITIONAL, METABOLIC AND IMMUNITY DISORDER: Primary | ICD-10-CM

## 2022-01-05 DIAGNOSIS — Z13.228 SCREENING FOR ENDOCRINE, NUTRITIONAL, METABOLIC AND IMMUNITY DISORDER: Primary | ICD-10-CM

## 2022-01-05 DIAGNOSIS — Z13.0 SCREENING FOR ENDOCRINE, NUTRITIONAL, METABOLIC AND IMMUNITY DISORDER: Primary | ICD-10-CM

## 2022-01-05 DIAGNOSIS — Z13.29 SCREENING FOR ENDOCRINE, NUTRITIONAL, METABOLIC AND IMMUNITY DISORDER: Primary | ICD-10-CM

## 2022-01-05 LAB
ALBUMIN SERPL-MCNC: 3.5 G/DL (ref 3.4–5)
ALBUMIN/GLOB SERPL: 1.2 {RATIO} (ref 1–2)
ALP LIVER SERPL-CCNC: 93 U/L
ALT SERPL-CCNC: 23 U/L
ANION GAP SERPL CALC-SCNC: 7 MMOL/L (ref 0–18)
AST SERPL-CCNC: 17 U/L (ref 15–37)
BASOPHILS # BLD AUTO: 0.04 X10(3) UL (ref 0–0.2)
BASOPHILS NFR BLD AUTO: 0.7 %
BILIRUB SERPL-MCNC: 0.7 MG/DL (ref 0.1–2)
BUN BLD-MCNC: 20 MG/DL (ref 7–18)
CALCIUM BLD-MCNC: 9.6 MG/DL (ref 8.5–10.1)
CHLORIDE SERPL-SCNC: 109 MMOL/L (ref 98–112)
CHOLEST SERPL-MCNC: 153 MG/DL (ref ?–200)
CO2 SERPL-SCNC: 27 MMOL/L (ref 21–32)
CREAT BLD-MCNC: 0.86 MG/DL
EOSINOPHIL # BLD AUTO: 0.16 X10(3) UL (ref 0–0.7)
EOSINOPHIL NFR BLD AUTO: 2.6 %
ERYTHROCYTE [DISTWIDTH] IN BLOOD BY AUTOMATED COUNT: 14.2 %
FASTING PATIENT LIPID ANSWER: YES
FASTING STATUS PATIENT QL REPORTED: YES
GLOBULIN PLAS-MCNC: 2.9 G/DL (ref 2.8–4.4)
GLUCOSE BLD-MCNC: 88 MG/DL (ref 70–99)
HCT VFR BLD AUTO: 39.5 %
HDLC SERPL-MCNC: 53 MG/DL (ref 40–59)
HGB BLD-MCNC: 12.9 G/DL
IMM GRANULOCYTES # BLD AUTO: 0.01 X10(3) UL (ref 0–1)
IMM GRANULOCYTES NFR BLD: 0.2 %
LDLC SERPL CALC-MCNC: 85 MG/DL (ref ?–100)
LYMPHOCYTES # BLD AUTO: 2.02 X10(3) UL (ref 1–4)
LYMPHOCYTES NFR BLD AUTO: 32.8 %
MCH RBC QN AUTO: 31.2 PG (ref 26–34)
MCHC RBC AUTO-ENTMCNC: 32.7 G/DL (ref 31–37)
MCV RBC AUTO: 95.6 FL
MONOCYTES # BLD AUTO: 0.44 X10(3) UL (ref 0.1–1)
MONOCYTES NFR BLD AUTO: 7.2 %
NEUTROPHILS # BLD AUTO: 3.48 X10 (3) UL (ref 1.5–7.7)
NEUTROPHILS # BLD AUTO: 3.48 X10(3) UL (ref 1.5–7.7)
NEUTROPHILS NFR BLD AUTO: 56.5 %
NONHDLC SERPL-MCNC: 100 MG/DL (ref ?–130)
OSMOLALITY SERPL CALC.SUM OF ELEC: 298 MOSM/KG (ref 275–295)
PLATELET # BLD AUTO: 276 10(3)UL (ref 150–450)
POTASSIUM SERPL-SCNC: 4.6 MMOL/L (ref 3.5–5.1)
PROT SERPL-MCNC: 6.4 G/DL (ref 6.4–8.2)
RBC # BLD AUTO: 4.13 X10(6)UL
SODIUM SERPL-SCNC: 143 MMOL/L (ref 136–145)
TRIGL SERPL-MCNC: 79 MG/DL (ref 30–149)
VIT D+METAB SERPL-MCNC: 46.5 NG/ML (ref 30–100)
VLDLC SERPL CALC-MCNC: 13 MG/DL (ref 0–30)
WBC # BLD AUTO: 6.2 X10(3) UL (ref 4–11)

## 2022-01-05 PROCEDURE — 80061 LIPID PANEL: CPT | Performed by: FAMILY MEDICINE

## 2022-01-06 ENCOUNTER — TELEPHONE (OUTPATIENT)
Dept: FAMILY MEDICINE CLINIC | Facility: CLINIC | Age: 67
End: 2022-01-06

## 2022-01-07 ENCOUNTER — PATIENT MESSAGE (OUTPATIENT)
Dept: FAMILY MEDICINE CLINIC | Facility: CLINIC | Age: 67
End: 2022-01-07

## 2022-01-07 ENCOUNTER — TELEPHONE (OUTPATIENT)
Dept: FAMILY MEDICINE CLINIC | Facility: CLINIC | Age: 67
End: 2022-01-07

## 2022-01-07 DIAGNOSIS — E78.00 PURE HYPERCHOLESTEROLEMIA: ICD-10-CM

## 2022-01-07 RX ORDER — PRAVASTATIN SODIUM 20 MG
TABLET ORAL
Qty: 30 TABLET | Refills: 0 | Status: SHIPPED | OUTPATIENT
Start: 2022-01-07 | End: 2022-02-07

## 2022-01-07 NOTE — TELEPHONE ENCOUNTER
Medication(s) to Refill:   Requested Prescriptions     Pending Prescriptions Disp Refills   • pravastatin 20 MG Oral Tab 30 tablet 0     Sig: TAKE 1 TABLET BY MOUTH EVERY EVENING         Reason for Medication Refill being sent to Provider / Reason Protocol

## 2022-01-07 NOTE — TELEPHONE ENCOUNTER
From: Kendall Rea  To: Delgado Gerardo  Sent: 1/7/2022 8:07 AM CST  Subject: lab results    Dr Sneha Renteria has reviewed your labs and states they are normal. If you have any questions, please call the office. Have a great day. Sandra Garcia RN

## 2022-01-07 NOTE — TELEPHONE ENCOUNTER
ED Time Seen By Provider Entered On:  5/10/2017 18:38     Performed On:  5/10/2017 18:38  by Roseline Figueroa.      Time Seen By Provider   Time Seen by Provider :   5/10/2017 18:18    Roseline Figueroa. - 5/10/2017 18:38            ----- Message from Greer Valerio MD sent at 1/6/2022 12:40 PM CST -----  Normal results.

## 2022-01-11 RX ORDER — AMOXICILLIN 500 MG/1
CAPSULE ORAL
Qty: 4 CAPSULE | Refills: 0 | Status: SHIPPED | OUTPATIENT
Start: 2022-01-11

## 2022-01-12 RX ORDER — FLUCONAZOLE 150 MG/1
TABLET ORAL
Qty: 2 TABLET | Refills: 3 | Status: SHIPPED | OUTPATIENT
Start: 2022-01-12

## 2022-01-31 RX ORDER — METOPROLOL SUCCINATE 50 MG/1
TABLET, EXTENDED RELEASE ORAL
Qty: 90 TABLET | Refills: 0 | Status: SHIPPED | OUTPATIENT
Start: 2022-01-31

## 2022-01-31 NOTE — TELEPHONE ENCOUNTER
Medication(s) to Refill:   Requested Prescriptions     Pending Prescriptions Disp Refills   • METOPROLOL SUCCINATE 50 MG Oral Tablet 24 Hr [Pharmacy Med Name: METOPROLOL ER SUCCINATE 50MG TABS] 90 tablet 0     Sig: TAKE 1 TABLET BY MOUTH EVERY DAY

## 2022-02-07 RX ORDER — PRAVASTATIN SODIUM 20 MG
TABLET ORAL
Qty: 30 TABLET | Refills: 0 | Status: SHIPPED | OUTPATIENT
Start: 2022-02-07 | End: 2022-03-07

## 2022-02-21 RX ORDER — POTASSIUM CHLORIDE 750 MG/1
TABLET, FILM COATED, EXTENDED RELEASE ORAL
Qty: 180 TABLET | Refills: 1 | Status: SHIPPED | OUTPATIENT
Start: 2022-02-21

## 2022-03-07 ENCOUNTER — TELEPHONE (OUTPATIENT)
Dept: FAMILY MEDICINE CLINIC | Facility: CLINIC | Age: 67
End: 2022-03-07

## 2022-03-07 DIAGNOSIS — E78.00 PURE HYPERCHOLESTEROLEMIA: ICD-10-CM

## 2022-03-07 RX ORDER — PRAVASTATIN SODIUM 20 MG
TABLET ORAL
Qty: 30 TABLET | Refills: 0 | Status: SHIPPED | OUTPATIENT
Start: 2022-03-07 | End: 2022-03-25

## 2022-03-07 RX ORDER — PRAVASTATIN SODIUM 20 MG
TABLET ORAL
Qty: 90 TABLET | Refills: 0 | OUTPATIENT
Start: 2022-03-07

## 2022-03-07 NOTE — TELEPHONE ENCOUNTER
30 day supply filled for Pravastatin. Pt overdue for f/u and will need appt before any further refill requests. Please call to schedule, thanks!

## 2022-03-25 ENCOUNTER — OFFICE VISIT (OUTPATIENT)
Dept: FAMILY MEDICINE CLINIC | Facility: CLINIC | Age: 67
End: 2022-03-25
Payer: MEDICARE

## 2022-03-25 VITALS
SYSTOLIC BLOOD PRESSURE: 126 MMHG | RESPIRATION RATE: 18 BRPM | DIASTOLIC BLOOD PRESSURE: 80 MMHG | HEART RATE: 64 BPM | WEIGHT: 229 LBS | OXYGEN SATURATION: 97 % | BODY MASS INDEX: 35.94 KG/M2 | HEIGHT: 67 IN

## 2022-03-25 DIAGNOSIS — M85.89 OSTEOPENIA OF MULTIPLE SITES: ICD-10-CM

## 2022-03-25 DIAGNOSIS — Z86.018 HISTORY OF PITUITARY ADENOMA: ICD-10-CM

## 2022-03-25 DIAGNOSIS — R60.0 EDEMA OF BOTH LEGS: ICD-10-CM

## 2022-03-25 DIAGNOSIS — C44.41 BASAL CELL CARCINOMA (BCC) OF SCALP: ICD-10-CM

## 2022-03-25 DIAGNOSIS — M47.816 LUMBAR SPONDYLOSIS: ICD-10-CM

## 2022-03-25 DIAGNOSIS — F17.200 SMOKING: ICD-10-CM

## 2022-03-25 DIAGNOSIS — E78.00 PURE HYPERCHOLESTEROLEMIA: ICD-10-CM

## 2022-03-25 DIAGNOSIS — J30.1 SEASONAL ALLERGIC RHINITIS DUE TO POLLEN: ICD-10-CM

## 2022-03-25 DIAGNOSIS — G47.33 OBSTRUCTIVE SLEEP APNEA (ADULT) (PEDIATRIC): ICD-10-CM

## 2022-03-25 DIAGNOSIS — I10 ESSENTIAL HYPERTENSION, BENIGN: ICD-10-CM

## 2022-03-25 DIAGNOSIS — Z78.0 MENOPAUSE: ICD-10-CM

## 2022-03-25 DIAGNOSIS — Z00.00 WELCOME TO MEDICARE PREVENTIVE VISIT: ICD-10-CM

## 2022-03-25 DIAGNOSIS — M16.11 ARTHRITIS OF RIGHT HIP: ICD-10-CM

## 2022-03-25 DIAGNOSIS — E03.9 ACQUIRED HYPOTHYROIDISM: ICD-10-CM

## 2022-03-25 DIAGNOSIS — M51.9 LUMBAR DISC DISEASE: ICD-10-CM

## 2022-03-25 DIAGNOSIS — Z12.31 VISIT FOR SCREENING MAMMOGRAM: Primary | ICD-10-CM

## 2022-03-25 PROBLEM — E66.01 MORBID OBESITY (HCC): Status: RESOLVED | Noted: 2021-02-22 | Resolved: 2022-03-25

## 2022-03-25 PROBLEM — R60.9 CHRONIC EDEMA: Status: RESOLVED | Noted: 2021-02-22 | Resolved: 2022-03-25

## 2022-03-25 PROBLEM — C44.619 BASAL CELL CARCINOMA (BCC) OF LEFT FOREARM: Status: RESOLVED | Noted: 2018-11-01 | Resolved: 2022-03-25

## 2022-03-25 PROCEDURE — G0402 INITIAL PREVENTIVE EXAM: HCPCS | Performed by: FAMILY MEDICINE

## 2022-03-25 PROCEDURE — G0403 EKG FOR INITIAL PREVENT EXAM: HCPCS | Performed by: FAMILY MEDICINE

## 2022-03-25 RX ORDER — PHENTERMINE HYDROCHLORIDE 37.5 MG/1
37.5 TABLET ORAL
COMMUNITY

## 2022-03-25 RX ORDER — PRAVASTATIN SODIUM 20 MG
20 TABLET ORAL EVERY EVENING
Qty: 90 TABLET | Refills: 4 | Status: SHIPPED | OUTPATIENT
Start: 2022-03-25

## 2022-03-25 RX ORDER — ASPIRIN 325 MG
325 TABLET ORAL DAILY
COMMUNITY

## 2022-03-25 RX ORDER — LEVOTHYROXINE SODIUM 137 UG/1
137 TABLET ORAL DAILY
COMMUNITY
Start: 2022-03-07

## 2022-03-25 NOTE — PATIENT INSTRUCTIONS
Bubba Lezama M.D.   Spine, Back and Neck    Phone: [phone],   [place], [facility] [address], [address]  [address]

## 2022-04-26 RX ORDER — METOPROLOL SUCCINATE 50 MG/1
TABLET, EXTENDED RELEASE ORAL
Qty: 90 TABLET | Refills: 0 | Status: SHIPPED | OUTPATIENT
Start: 2022-04-26 | End: 2022-04-27

## 2022-04-27 RX ORDER — METOPROLOL SUCCINATE 50 MG/1
TABLET, EXTENDED RELEASE ORAL
Qty: 90 TABLET | Refills: 0 | Status: SHIPPED | OUTPATIENT
Start: 2022-04-27

## 2022-06-24 RX ORDER — BUMETANIDE 2 MG/1
TABLET ORAL
Qty: 180 TABLET | Refills: 1 | Status: SHIPPED | OUTPATIENT
Start: 2022-06-24

## 2022-08-15 RX ORDER — POTASSIUM CHLORIDE 750 MG/1
TABLET, FILM COATED, EXTENDED RELEASE ORAL
Qty: 180 TABLET | Refills: 1 | Status: SHIPPED | OUTPATIENT
Start: 2022-08-15

## 2022-09-08 ENCOUNTER — TELEMEDICINE (OUTPATIENT)
Dept: FAMILY MEDICINE CLINIC | Facility: CLINIC | Age: 67
End: 2022-09-08

## 2022-09-08 DIAGNOSIS — U07.1 COVID-19: Primary | ICD-10-CM

## 2022-09-08 PROCEDURE — 99214 OFFICE O/P EST MOD 30 MIN: CPT | Performed by: NURSE PRACTITIONER

## 2022-09-08 RX ORDER — AZITHROMYCIN 500 MG/1
500 TABLET, FILM COATED ORAL DAILY
Qty: 5 TABLET | Refills: 0 | Status: SHIPPED | OUTPATIENT
Start: 2022-09-08 | End: 2022-09-13

## 2022-09-08 RX ORDER — PREDNISONE 20 MG/1
TABLET ORAL
Qty: 9 TABLET | Refills: 0 | Status: SHIPPED | OUTPATIENT
Start: 2022-09-08

## 2022-09-08 NOTE — PROGRESS NOTES
Telehealth outside of [address] Verbal Consent   I conducted a telehealth visit with Abhishek Reynoso today, 09/08/22, which was completed using two-way, real-time interactive  video communication. This has been done in good chastity to provide continuity of care in the best interest of the provider-patient relationship, due to the COVID -19 public health crisis/national emergency where restrictions of face-to-face office visits are ongoing. Every conscious effort was taken to allow for sufficient and adequate time to complete the visit. The patient was made aware of the limitations of the telehealth visit, including treatment limitations as no physical exam could be performed. The patient was advised to call 911 or to go to the ER in case there was an emergency. The patient was also advised of the potential privacy & security concerns related to the telehealth platform. The patient was made aware of where to find [facility] notice of privacy practices, telehealth consent form and other related consent forms and documents. which are located on the [facility] website. The patient verbally agreed to telehealth consent form, related consents and the risks discussed. Lastly, the patient confirmed that they were in [place]. Included in this visit, time may have been spent reviewing labs, medications, radiology tests and decision making. Appropriate medical decision-making and tests are ordered as detailed in the plan of care above. Coding/billing information is submitted for this visit based on complexity of care and/or time spent for the visit. Duration 5-10 minutes   COVID Note    Subjective:  Abhishek Reynoso is currently Confirmed for COVID-19.   Presenting symptoms: chills, sore throat, nasal congestion, headache and muscle pain  she denies fever, shortness of breath, chest pain, diarrhea, loss of smell and loss of taste   Symptoms began on 9/3/22  Exposure source: social contact    COVID Test Result: Positive on 9/3/22    Objective:  Exam  General : AAOx3, speech is clear , in no acute distress. ECG:     QTc interval:   QTC Calculation (Mitochon Systemset)   Date Value Ref Range Status   06/08/2019 454 ms Final       Assessment/Plan    Diagnoses and all orders for this visit:    COVID-19  -     nirmatrelvir&ritonavir 300/100 20 x 150 MG & 10 x 100MG Oral Tablet Therapy Pack; Take two nirmatrelvir tablets (300mg) with one ritonavir tablet (100mg) together twice daily for 5 days. -     azithromycin 500 MG Oral Tab; Take 1 tablet (500 mg total) by mouth daily for 5 days. -     predniSONE 20 MG Oral Tab; 40mg po qd for 3 days, then 20mg po qd for 3 days     isolate away from any household members as much as possible and the general public COMPLETELY for 3 days after symptoms resolve, or 7 days total (whichever is longer). If you can use an isolated bathroom that would be ideal. If you develop chest pain, altered mental status/confusion, or if unable to speak full sentence due to shortness of breath, should to come to emergency department. If able, please call ahead to let the hospital know you are on your way. Unless contraindicated due to chronic illness, may take Tylenol 650 mg every 6 hours as needed for pain/temperature. Do not take any OTC anti-inflammatories (ibuprofen, naproxen, aspirin, etc). Please get plenty of rest and increase your intake of oral fluids. If you have any further questions, please contact [address] [place] [address] at [phone].

## 2022-10-03 ENCOUNTER — TELEPHONE (OUTPATIENT)
Dept: FAMILY MEDICINE CLINIC | Facility: CLINIC | Age: 67
End: 2022-10-03

## 2022-10-06 ENCOUNTER — TELEPHONE (OUTPATIENT)
Dept: FAMILY MEDICINE CLINIC | Facility: CLINIC | Age: 67
End: 2022-10-06

## 2022-10-06 NOTE — TELEPHONE ENCOUNTER
Pt called saying she will not be able to come in to  paperwork, but her  is picking it up    Derek eH

## 2022-10-08 ENCOUNTER — MED REC SCAN ONLY (OUTPATIENT)
Dept: FAMILY MEDICINE CLINIC | Facility: CLINIC | Age: 67
End: 2022-10-08

## 2022-10-21 NOTE — TELEPHONE ENCOUNTER
I discussed with Dr. Sharmin Pittman and he is ok with her increasing Tramadol ER to 200mg daily. She can go ahead and increase. She will need an early refill then. Thanks!
Patient was informed of recommendations below. She verbalized understanding. Patient will call office a couple days before she runs out.
Received message from PCP stating that patient should not take NSAIDs. Meloxicam has been discontinued off of her medication list, will you please make sure she is aware not to take it or other NSAIDs,   Thanks!
Spoke to pt to relay info below. Pt states PCP informed her that she would reach out to our office to determine if a replacement to meloxicam would be provided. Advised pt that no alternatives had been recommended, but feedback would be requested.  Pt confi
done

## 2022-10-26 RX ORDER — METOPROLOL SUCCINATE 50 MG/1
TABLET, EXTENDED RELEASE ORAL
Qty: 90 TABLET | Refills: 0 | Status: SHIPPED | OUTPATIENT
Start: 2022-10-26

## 2023-01-17 RX ORDER — METOPROLOL SUCCINATE 50 MG/1
50 TABLET, EXTENDED RELEASE ORAL DAILY
Qty: 90 TABLET | Refills: 0 | Status: SHIPPED | OUTPATIENT
Start: 2023-01-17

## 2023-01-24 RX ORDER — BUMETANIDE 2 MG/1
4 TABLET ORAL EVERY MORNING
Qty: 180 TABLET | Refills: 0 | Status: SHIPPED | OUTPATIENT
Start: 2023-01-24

## 2023-01-24 NOTE — TELEPHONE ENCOUNTER
BUMETANIDE 2 MG Oral Tab    Refill requested    LOV: 3/25/22    VV: 9/8/22    LF: 6/24/22    allio in [employer]

## 2023-02-14 RX ORDER — POTASSIUM CHLORIDE 750 MG/1
10 TABLET, FILM COATED, EXTENDED RELEASE ORAL 2 TIMES DAILY
Qty: 180 TABLET | Refills: 0 | Status: SHIPPED | OUTPATIENT
Start: 2023-02-14

## 2023-02-14 NOTE — TELEPHONE ENCOUNTER
Pt needs POTASSIUM CHLORIDE ER 10 MEQ Oral Tab   To be refilled and sent to [facility].

## 2023-02-22 ENCOUNTER — TELEPHONE (OUTPATIENT)
Dept: FAMILY MEDICINE CLINIC | Facility: CLINIC | Age: 68
End: 2023-02-22

## 2023-03-20 ENCOUNTER — TELEPHONE (OUTPATIENT)
Dept: FAMILY MEDICINE CLINIC | Facility: CLINIC | Age: 68
End: 2023-03-20

## 2023-03-20 ENCOUNTER — OFFICE VISIT (OUTPATIENT)
Dept: FAMILY MEDICINE CLINIC | Facility: CLINIC | Age: 68
End: 2023-03-20
Payer: MEDICARE

## 2023-03-20 VITALS
OXYGEN SATURATION: 99 % | DIASTOLIC BLOOD PRESSURE: 72 MMHG | SYSTOLIC BLOOD PRESSURE: 120 MMHG | RESPIRATION RATE: 18 BRPM | HEART RATE: 62 BPM | BODY MASS INDEX: 43 KG/M2 | WEIGHT: 274 LBS | HEIGHT: 67 IN

## 2023-03-20 DIAGNOSIS — M47.816 LUMBAR SPONDYLOSIS: ICD-10-CM

## 2023-03-20 DIAGNOSIS — F17.200 SMOKING: ICD-10-CM

## 2023-03-20 DIAGNOSIS — Z00.00 MEDICARE ANNUAL WELLNESS VISIT, INITIAL: ICD-10-CM

## 2023-03-20 DIAGNOSIS — E03.9 ACQUIRED HYPOTHYROIDISM: Chronic | ICD-10-CM

## 2023-03-20 DIAGNOSIS — Z86.018 HISTORY OF PITUITARY ADENOMA: ICD-10-CM

## 2023-03-20 DIAGNOSIS — J30.1 SEASONAL ALLERGIC RHINITIS DUE TO POLLEN: ICD-10-CM

## 2023-03-20 DIAGNOSIS — G47.33 OBSTRUCTIVE SLEEP APNEA (ADULT) (PEDIATRIC): ICD-10-CM

## 2023-03-20 DIAGNOSIS — M85.89 OSTEOPENIA OF MULTIPLE SITES: ICD-10-CM

## 2023-03-20 DIAGNOSIS — Z12.31 ENCOUNTER FOR SCREENING MAMMOGRAM FOR MALIGNANT NEOPLASM OF BREAST: Primary | ICD-10-CM

## 2023-03-20 DIAGNOSIS — E66.01 MORBID OBESITY (HCC): ICD-10-CM

## 2023-03-20 DIAGNOSIS — M16.11 ARTHRITIS OF RIGHT HIP: ICD-10-CM

## 2023-03-20 DIAGNOSIS — E78.00 PURE HYPERCHOLESTEROLEMIA: ICD-10-CM

## 2023-03-20 DIAGNOSIS — M51.9 LUMBAR DISC DISEASE: ICD-10-CM

## 2023-03-20 DIAGNOSIS — E55.9 VITAMIN D DEFICIENCY: ICD-10-CM

## 2023-03-20 DIAGNOSIS — R60.0 EDEMA OF BOTH LEGS: ICD-10-CM

## 2023-03-20 DIAGNOSIS — I10 ESSENTIAL HYPERTENSION, BENIGN: ICD-10-CM

## 2023-03-20 RX ORDER — OXYCODONE HCL 10 MG/1
10 TABLET, FILM COATED, EXTENDED RELEASE ORAL EVERY 12 HOURS
Qty: 60 TABLET | Refills: 0 | Status: SHIPPED | OUTPATIENT
Start: 2023-03-20

## 2023-03-20 RX ORDER — NALOXONE HYDROCHLORIDE 4 MG/.1ML
4 SPRAY NASAL AS NEEDED
Qty: 1 KIT | Refills: 0 | Status: SHIPPED | OUTPATIENT
Start: 2023-03-20

## 2023-03-20 NOTE — TELEPHONE ENCOUNTER
Pt called to say she was just seen and  said she was going to give her oxycodine for pain, but no rx sent

## 2023-03-20 NOTE — TELEPHONE ENCOUNTER
Rx was sent today at 2:17, confirmation received in Epic. Message sent to pt via [employer] with this info.

## 2023-03-20 NOTE — PATIENT INSTRUCTIONS
Dr. Esther Bello MD      Orthopedic Surgeon, shoulder specialist.  Address: [address] [place] [address] [address], [place], [address]   Tel: [address] [facility],[address] [facility]. [facility] [facility], [address] [facility], [address] [place] [address] [address]  Tel: [address] [facility]. Dreama Krabbe, M.D. Spine, Back and Neck    Phone: [phone],   [place], [address] [place] [address], [address] [place] [address]  [place], [address] [place] [address]                   Dr. Hiren Leon and Dr. Tena Lyn. [facility]    [address] [facility]  [place], [address]  Phone: [phone]    [email or web address]

## 2023-03-20 NOTE — TELEPHONE ENCOUNTER
Pt called to say she would not like to see Dr Danielle Lopez, because Dr Abimael Hunter said she does not think he is a good fit for her

## 2023-03-21 ENCOUNTER — TELEPHONE (OUTPATIENT)
Dept: FAMILY MEDICINE CLINIC | Facility: CLINIC | Age: 68
End: 2023-03-21

## 2023-03-21 NOTE — TELEPHONE ENCOUNTER
Patient is having trouble with oxyCODONE ER (OXYCONTIN) 10 MG Oral Tablet Extended Release 12 hour Abuse-Deterrent    She stated that she used to take Norco, and not sure if this new medication would be good for her. Pharmacy was explaining information to patient about needing an affardavit from going back and forth from oxy to norco. Patient was very anxious, she would like to talk to someone regarding what happened at the pharmacy.  She did not take the medication from them>They told her a lot of stuff and she is afraid

## 2023-03-21 NOTE — TELEPHONE ENCOUNTER
I spoke with pt, she was concerned when pharmacist explained to her that she would have to sign affidavits for her Oxycodone & Norco.  Pt does not want Oxycodone & would prefer to stay on Norco, she does not want to risk that her Pain Specialist will not prescribe Norco in the future. She thought she could try Oxycodone for 1 month then resume Norco next month if it did not help her pain. I called [facility] & message left on [facility] VM to cancel Oxycodone prescription. All questions answered, pt expresses understanding.

## 2023-03-23 ENCOUNTER — PATIENT MESSAGE (OUTPATIENT)
Dept: FAMILY MEDICINE CLINIC | Facility: CLINIC | Age: 68
End: 2023-03-23

## 2023-04-25 RX ORDER — METOPROLOL SUCCINATE 50 MG/1
TABLET, EXTENDED RELEASE ORAL
Qty: 90 TABLET | Refills: 0 | Status: SHIPPED | OUTPATIENT
Start: 2023-04-25

## 2023-04-25 RX ORDER — BUMETANIDE 2 MG/1
4 TABLET ORAL EVERY MORNING
Qty: 180 TABLET | Refills: 0 | Status: SHIPPED | OUTPATIENT
Start: 2023-04-25

## 2023-04-26 ENCOUNTER — TELEPHONE (OUTPATIENT)
Dept: FAMILY MEDICINE CLINIC | Facility: CLINIC | Age: 68
End: 2023-04-26

## 2023-05-03 ENCOUNTER — OFFICE VISIT (OUTPATIENT)
Dept: FAMILY MEDICINE CLINIC | Facility: CLINIC | Age: 68
End: 2023-05-03
Payer: MEDICARE

## 2023-05-03 VITALS
DIASTOLIC BLOOD PRESSURE: 78 MMHG | RESPIRATION RATE: 18 BRPM | SYSTOLIC BLOOD PRESSURE: 120 MMHG | WEIGHT: 269 LBS | BODY MASS INDEX: 42.22 KG/M2 | HEIGHT: 67 IN

## 2023-05-03 DIAGNOSIS — G47.33 OBSTRUCTIVE SLEEP APNEA (ADULT) (PEDIATRIC): ICD-10-CM

## 2023-05-03 DIAGNOSIS — R60.0 EDEMA OF BOTH LEGS: ICD-10-CM

## 2023-05-03 DIAGNOSIS — M16.12 ARTHRITIS OF LEFT HIP: ICD-10-CM

## 2023-05-03 DIAGNOSIS — Z01.818 PREOP EXAMINATION: Primary | ICD-10-CM

## 2023-05-03 DIAGNOSIS — E78.00 PURE HYPERCHOLESTEROLEMIA: ICD-10-CM

## 2023-05-03 DIAGNOSIS — E03.9 ACQUIRED HYPOTHYROIDISM: Chronic | ICD-10-CM

## 2023-05-03 DIAGNOSIS — I10 ESSENTIAL HYPERTENSION, BENIGN: ICD-10-CM

## 2023-05-03 PROCEDURE — 99215 OFFICE O/P EST HI 40 MIN: CPT | Performed by: FAMILY MEDICINE

## 2023-05-08 ENCOUNTER — EKG ENCOUNTER (OUTPATIENT)
Dept: LAB | Age: 68
End: 2023-05-08
Attending: FAMILY MEDICINE
Payer: MEDICARE

## 2023-05-08 ENCOUNTER — LAB ENCOUNTER (OUTPATIENT)
Dept: LAB | Age: 68
End: 2023-05-08
Attending: FAMILY MEDICINE
Payer: MEDICARE

## 2023-05-08 DIAGNOSIS — Z01.818 PREOP EXAMINATION: ICD-10-CM

## 2023-05-08 DIAGNOSIS — I10 ESSENTIAL HYPERTENSION, BENIGN: ICD-10-CM

## 2023-05-08 DIAGNOSIS — E55.9 VITAMIN D DEFICIENCY: ICD-10-CM

## 2023-05-08 LAB
ALBUMIN SERPL-MCNC: 3.7 G/DL (ref 3.4–5)
ALBUMIN/GLOB SERPL: 1.1 {RATIO} (ref 1–2)
ALP LIVER SERPL-CCNC: 85 U/L
ALT SERPL-CCNC: 20 U/L
ANION GAP SERPL CALC-SCNC: 4 MMOL/L (ref 0–18)
AST SERPL-CCNC: 15 U/L (ref 15–37)
ATRIAL RATE: 52 BPM
BASOPHILS # BLD AUTO: 0.04 X10(3) UL (ref 0–0.2)
BASOPHILS NFR BLD AUTO: 0.6 %
BILIRUB SERPL-MCNC: 0.7 MG/DL (ref 0.1–2)
BILIRUB UR QL STRIP.AUTO: NEGATIVE
BUN BLD-MCNC: 23 MG/DL (ref 7–18)
CALCIUM BLD-MCNC: 9.5 MG/DL (ref 8.5–10.1)
CHLORIDE SERPL-SCNC: 107 MMOL/L (ref 98–112)
CHOLEST SERPL-MCNC: 176 MG/DL (ref ?–200)
CO2 SERPL-SCNC: 27 MMOL/L (ref 21–32)
CREAT BLD-MCNC: 0.86 MG/DL
EOSINOPHIL # BLD AUTO: 0.27 X10(3) UL (ref 0–0.7)
EOSINOPHIL NFR BLD AUTO: 4.1 %
ERYTHROCYTE [DISTWIDTH] IN BLOOD BY AUTOMATED COUNT: 13.5 %
FASTING PATIENT LIPID ANSWER: YES
FASTING STATUS PATIENT QL REPORTED: YES
GFR SERPLBLD BASED ON 1.73 SQ M-ARVRAT: 74 ML/MIN/1.73M2 (ref 60–?)
GLOBULIN PLAS-MCNC: 3.3 G/DL (ref 2.8–4.4)
GLUCOSE BLD-MCNC: 91 MG/DL (ref 70–99)
GLUCOSE UR STRIP.AUTO-MCNC: NEGATIVE MG/DL
HCT VFR BLD AUTO: 41.2 %
HDLC SERPL-MCNC: 49 MG/DL (ref 40–59)
HGB BLD-MCNC: 13.3 G/DL
IMM GRANULOCYTES # BLD AUTO: 0.02 X10(3) UL (ref 0–1)
IMM GRANULOCYTES NFR BLD: 0.3 %
LDLC SERPL CALC-MCNC: 107 MG/DL (ref ?–100)
LEUKOCYTE ESTERASE UR QL STRIP.AUTO: NEGATIVE
LYMPHOCYTES # BLD AUTO: 2.42 X10(3) UL (ref 1–4)
LYMPHOCYTES NFR BLD AUTO: 37.2 %
MCH RBC QN AUTO: 30.6 PG (ref 26–34)
MCHC RBC AUTO-ENTMCNC: 32.3 G/DL (ref 31–37)
MCV RBC AUTO: 94.9 FL
MONOCYTES # BLD AUTO: 0.47 X10(3) UL (ref 0.1–1)
MONOCYTES NFR BLD AUTO: 7.2 %
NEUTROPHILS # BLD AUTO: 3.29 X10 (3) UL (ref 1.5–7.7)
NEUTROPHILS # BLD AUTO: 3.29 X10(3) UL (ref 1.5–7.7)
NEUTROPHILS NFR BLD AUTO: 50.6 %
NITRITE UR QL STRIP.AUTO: NEGATIVE
NONHDLC SERPL-MCNC: 127 MG/DL (ref ?–130)
OSMOLALITY SERPL CALC.SUM OF ELEC: 289 MOSM/KG (ref 275–295)
P AXIS: 70 DEGREES
P-R INTERVAL: 134 MS
PH UR STRIP.AUTO: 5 [PH] (ref 5–8)
PLATELET # BLD AUTO: 280 10(3)UL (ref 150–450)
POTASSIUM SERPL-SCNC: 3.8 MMOL/L (ref 3.5–5.1)
PROT SERPL-MCNC: 7 G/DL (ref 6.4–8.2)
PROT UR STRIP.AUTO-MCNC: NEGATIVE MG/DL
Q-T INTERVAL: 482 MS
QRS DURATION: 98 MS
QTC CALCULATION (BEZET): 448 MS
R AXIS: 58 DEGREES
RBC # BLD AUTO: 4.34 X10(6)UL
RBC UR QL AUTO: NEGATIVE
SODIUM SERPL-SCNC: 138 MMOL/L (ref 136–145)
SP GR UR STRIP.AUTO: >1.03 (ref 1–1.03)
T AXIS: 48 DEGREES
TRIGL SERPL-MCNC: 108 MG/DL (ref 30–149)
TSI SER-ACNC: 1.47 MIU/ML (ref 0.36–3.74)
UROBILINOGEN UR STRIP.AUTO-MCNC: 2 MG/DL
VENTRICULAR RATE: 52 BPM
VIT D+METAB SERPL-MCNC: 58.2 NG/ML (ref 30–100)
VLDLC SERPL CALC-MCNC: 18 MG/DL (ref 0–30)
WBC # BLD AUTO: 6.5 X10(3) UL (ref 4–11)

## 2023-05-08 PROCEDURE — 36415 COLL VENOUS BLD VENIPUNCTURE: CPT

## 2023-05-08 PROCEDURE — 87081 CULTURE SCREEN ONLY: CPT

## 2023-05-08 PROCEDURE — 80053 COMPREHEN METABOLIC PANEL: CPT | Performed by: FAMILY MEDICINE

## 2023-05-08 PROCEDURE — 93010 ELECTROCARDIOGRAM REPORT: CPT | Performed by: INTERNAL MEDICINE

## 2023-05-08 PROCEDURE — 93005 ELECTROCARDIOGRAM TRACING: CPT

## 2023-05-08 PROCEDURE — 82306 VITAMIN D 25 HYDROXY: CPT

## 2023-05-08 PROCEDURE — 81001 URINALYSIS AUTO W/SCOPE: CPT

## 2023-05-08 PROCEDURE — 84443 ASSAY THYROID STIM HORMONE: CPT | Performed by: FAMILY MEDICINE

## 2023-05-08 PROCEDURE — 85025 COMPLETE CBC W/AUTO DIFF WBC: CPT | Performed by: FAMILY MEDICINE

## 2023-05-08 PROCEDURE — 80061 LIPID PANEL: CPT | Performed by: FAMILY MEDICINE

## 2023-05-10 ENCOUNTER — TELEPHONE (OUTPATIENT)
Dept: FAMILY MEDICINE CLINIC | Facility: CLINIC | Age: 68
End: 2023-05-10

## 2023-05-16 RX ORDER — POTASSIUM CHLORIDE 750 MG/1
TABLET, FILM COATED, EXTENDED RELEASE ORAL
Qty: 180 TABLET | Refills: 0 | Status: SHIPPED | OUTPATIENT
Start: 2023-05-16

## 2023-06-13 ENCOUNTER — MED REC SCAN ONLY (OUTPATIENT)
Dept: FAMILY MEDICINE CLINIC | Facility: CLINIC | Age: 68
End: 2023-06-13

## 2023-07-03 DIAGNOSIS — E78.00 PURE HYPERCHOLESTEROLEMIA: ICD-10-CM

## 2023-07-05 RX ORDER — PRAVASTATIN SODIUM 20 MG
20 TABLET ORAL EVERY EVENING
Qty: 90 TABLET | Refills: 0 | Status: SHIPPED | OUTPATIENT
Start: 2023-07-05

## 2023-07-24 RX ORDER — METOPROLOL SUCCINATE 50 MG/1
50 TABLET, EXTENDED RELEASE ORAL DAILY
Qty: 90 TABLET | Refills: 0 | Status: SHIPPED | OUTPATIENT
Start: 2023-07-24

## 2023-08-01 RX ORDER — BUMETANIDE 2 MG/1
4 TABLET ORAL EVERY MORNING
Qty: 180 TABLET | Refills: 0 | Status: SHIPPED | OUTPATIENT
Start: 2023-08-01

## 2023-08-15 RX ORDER — POTASSIUM CHLORIDE 750 MG/1
10 TABLET, FILM COATED, EXTENDED RELEASE ORAL 2 TIMES DAILY
Qty: 180 TABLET | Refills: 0 | Status: SHIPPED | OUTPATIENT
Start: 2023-08-15

## 2023-09-14 ENCOUNTER — PATIENT MESSAGE (OUTPATIENT)
Dept: FAMILY MEDICINE CLINIC | Facility: CLINIC | Age: 68
End: 2023-09-14

## 2023-09-27 ENCOUNTER — OFFICE VISIT (OUTPATIENT)
Dept: FAMILY MEDICINE CLINIC | Facility: CLINIC | Age: 68
End: 2023-09-27
Payer: MEDICARE

## 2023-09-27 VITALS
BODY MASS INDEX: 44.1 KG/M2 | RESPIRATION RATE: 18 BRPM | HEIGHT: 67 IN | OXYGEN SATURATION: 97 % | WEIGHT: 281 LBS | DIASTOLIC BLOOD PRESSURE: 76 MMHG | SYSTOLIC BLOOD PRESSURE: 110 MMHG | HEART RATE: 54 BPM

## 2023-09-27 DIAGNOSIS — M17.0 PRIMARY OSTEOARTHRITIS OF BOTH KNEES: ICD-10-CM

## 2023-09-27 DIAGNOSIS — M51.9 LUMBAR DISC DISEASE: Primary | ICD-10-CM

## 2023-09-27 DIAGNOSIS — E78.00 PURE HYPERCHOLESTEROLEMIA: ICD-10-CM

## 2023-09-27 PROCEDURE — 99214 OFFICE O/P EST MOD 30 MIN: CPT | Performed by: FAMILY MEDICINE

## 2023-09-28 RX ORDER — PRAVASTATIN SODIUM 20 MG
20 TABLET ORAL EVERY EVENING
Qty: 90 TABLET | Refills: 0 | Status: SHIPPED | OUTPATIENT
Start: 2023-09-28 | End: 2023-12-25

## 2023-10-13 RX ORDER — CELECOXIB 200 MG/1
200 CAPSULE ORAL EVERY MORNING
Qty: 30 CAPSULE | Refills: 0 | Status: SHIPPED | OUTPATIENT
Start: 2023-10-13

## 2023-10-13 NOTE — TELEPHONE ENCOUNTER
Medication(s) to Refill:   Requested Prescriptions     Pending Prescriptions Disp Refills    CELECOXIB 200 MG Oral Cap [Pharmacy Med Name: Celecoxib 200 Mg Cap Nort] 30 capsule 0     Sig: TAKE ONE CAPSULE BY MOUTH EVERY MORNING         Reason for Medication Refill being sent to Provider / Reason Protocol Failed:  [] 90 day refill has already been granted  [] Blood Pressure out of range  [] Labs Abnormal/over due  [] Medication not previously prescribed by Provider  [x] Non-Protocol Medication  [] Controlled Substance   [] Due for appointment- no future appointment scheduled  [] No Follow up specified      Last Time Medication was Filled:  9/13/2023      Last Office Visit with PCP: 9/27/2023    When Patient was Due Back to the Office:  not on jie e  (from when PCP last addressed condition)    Future Appointments:  No future appointments.       Last Blood Pressures:  BP Readings from Last 2 Encounters:  09/27/23 : 110/76  05/03/23 : 120/78      Action taken:  [] Refill approved per protocol  [x] Routing to provider for approval

## 2023-10-19 ENCOUNTER — ORDER TRANSCRIPTION (OUTPATIENT)
Dept: PHYSICAL THERAPY | Facility: HOSPITAL | Age: 68
End: 2023-10-19

## 2023-10-19 DIAGNOSIS — R60.0 LOWER EXTREMITY EDEMA: Primary | ICD-10-CM

## 2023-10-24 RX ORDER — METOPROLOL SUCCINATE 50 MG/1
50 TABLET, EXTENDED RELEASE ORAL DAILY
Qty: 90 TABLET | Refills: 0 | Status: SHIPPED | OUTPATIENT
Start: 2023-10-24

## 2023-10-31 RX ORDER — BUMETANIDE 2 MG/1
4 TABLET ORAL EVERY MORNING
Qty: 180 TABLET | Refills: 0 | Status: SHIPPED | OUTPATIENT
Start: 2023-10-31

## 2023-11-13 NOTE — TELEPHONE ENCOUNTER
Medication(s) to Refill:   Requested Prescriptions     Pending Prescriptions Disp Refills    POTASSIUM CHLORIDE ER 10 MEQ Oral Tab CR [Pharmacy Med Name: Potassium Chloride Er 10 Meq Tab Twip] 180 tablet 0     Sig: TAKE ONE TABLET BY MOUTH TWICE DAILY         Reason for Medication Refill being sent to Provider / Reason Protocol Failed:  [] 90 day refill has already been granted  [] Blood Pressure out of range  [] Labs Abnormal/over due  [] Medication not previously prescribed by Provider  [x] Non-Protocol Medication  [] Controlled Substance   [] Due for appointment- no future appointment scheduled  [] No Follow up specified      Last Time Medication was Filled:  8/15/2023      Last Office Visit with PCP: 9/27/2023  When Patient was Due Back to the Office:  not on file   (from when PCP last addressed condition)    Future Appointments:  No future appointments.       Last Blood Pressures:  BP Readings from Last 2 Encounters:   09/27/23 110/76   05/03/23 120/78       Action taken:  [] Refill approved per protocol  [x] Routing to provider for approval

## 2023-11-14 RX ORDER — POTASSIUM CHLORIDE 750 MG/1
10 TABLET, FILM COATED, EXTENDED RELEASE ORAL 2 TIMES DAILY
Qty: 180 TABLET | Refills: 0 | Status: SHIPPED | OUTPATIENT
Start: 2023-11-14

## 2023-11-28 ENCOUNTER — LAB ENCOUNTER (OUTPATIENT)
Dept: LAB | Age: 68
End: 2023-11-28
Attending: NURSE PRACTITIONER
Payer: MEDICARE

## 2023-11-28 ENCOUNTER — OFFICE VISIT (OUTPATIENT)
Dept: FAMILY MEDICINE CLINIC | Facility: CLINIC | Age: 68
End: 2023-11-28
Payer: MEDICARE

## 2023-11-28 VITALS
HEIGHT: 67 IN | HEART RATE: 68 BPM | OXYGEN SATURATION: 98 % | WEIGHT: 272 LBS | DIASTOLIC BLOOD PRESSURE: 74 MMHG | RESPIRATION RATE: 16 BRPM | SYSTOLIC BLOOD PRESSURE: 158 MMHG | BODY MASS INDEX: 42.69 KG/M2

## 2023-11-28 DIAGNOSIS — E66.01 CLASS 3 SEVERE OBESITY WITH SERIOUS COMORBIDITY AND BODY MASS INDEX (BMI) OF 40.0 TO 44.9 IN ADULT, UNSPECIFIED OBESITY TYPE (HCC): Primary | ICD-10-CM

## 2023-11-28 DIAGNOSIS — E03.9 ACQUIRED HYPOTHYROIDISM: ICD-10-CM

## 2023-11-28 DIAGNOSIS — R73.09 ELEVATED GLUCOSE: ICD-10-CM

## 2023-11-28 DIAGNOSIS — E78.00 PURE HYPERCHOLESTEROLEMIA: ICD-10-CM

## 2023-11-28 DIAGNOSIS — I10 ESSENTIAL HYPERTENSION, BENIGN: ICD-10-CM

## 2023-11-28 LAB
ALBUMIN SERPL-MCNC: 3.9 G/DL (ref 3.4–5)
ALBUMIN/GLOB SERPL: 1.1 {RATIO} (ref 1–2)
ALP LIVER SERPL-CCNC: 88 U/L
ALT SERPL-CCNC: 17 U/L
ANION GAP SERPL CALC-SCNC: 4 MMOL/L (ref 0–18)
AST SERPL-CCNC: 19 U/L (ref 15–37)
BASOPHILS # BLD AUTO: 0.04 X10(3) UL (ref 0–0.2)
BASOPHILS NFR BLD AUTO: 0.5 %
BILIRUB SERPL-MCNC: 0.5 MG/DL (ref 0.1–2)
BUN BLD-MCNC: 23 MG/DL (ref 9–23)
CALCIUM BLD-MCNC: 9.4 MG/DL (ref 8.5–10.1)
CHLORIDE SERPL-SCNC: 108 MMOL/L (ref 98–112)
CO2 SERPL-SCNC: 29 MMOL/L (ref 21–32)
CREAT BLD-MCNC: 0.96 MG/DL
EGFRCR SERPLBLD CKD-EPI 2021: 65 ML/MIN/1.73M2 (ref 60–?)
EOSINOPHIL # BLD AUTO: 0.16 X10(3) UL (ref 0–0.7)
EOSINOPHIL NFR BLD AUTO: 1.9 %
ERYTHROCYTE [DISTWIDTH] IN BLOOD BY AUTOMATED COUNT: 15.2 %
EST. AVERAGE GLUCOSE BLD GHB EST-MCNC: 123 MG/DL (ref 68–126)
FASTING STATUS PATIENT QL REPORTED: NO
GLOBULIN PLAS-MCNC: 3.5 G/DL (ref 2.8–4.4)
GLUCOSE BLD-MCNC: 87 MG/DL (ref 70–99)
HBA1C MFR BLD: 5.9 % (ref ?–5.7)
HCT VFR BLD AUTO: 40.3 %
HGB BLD-MCNC: 12.4 G/DL
IMM GRANULOCYTES # BLD AUTO: 0.03 X10(3) UL (ref 0–1)
IMM GRANULOCYTES NFR BLD: 0.4 %
LYMPHOCYTES # BLD AUTO: 1.84 X10(3) UL (ref 1–4)
LYMPHOCYTES NFR BLD AUTO: 21.9 %
MCH RBC QN AUTO: 29.4 PG (ref 26–34)
MCHC RBC AUTO-ENTMCNC: 30.8 G/DL (ref 31–37)
MCV RBC AUTO: 95.5 FL
MONOCYTES # BLD AUTO: 0.55 X10(3) UL (ref 0.1–1)
MONOCYTES NFR BLD AUTO: 6.5 %
NEUTROPHILS # BLD AUTO: 5.79 X10 (3) UL (ref 1.5–7.7)
NEUTROPHILS # BLD AUTO: 5.79 X10(3) UL (ref 1.5–7.7)
NEUTROPHILS NFR BLD AUTO: 68.8 %
OSMOLALITY SERPL CALC.SUM OF ELEC: 295 MOSM/KG (ref 275–295)
PLATELET # BLD AUTO: 317 10(3)UL (ref 150–450)
POTASSIUM SERPL-SCNC: 4.4 MMOL/L (ref 3.5–5.1)
PROT SERPL-MCNC: 7.4 G/DL (ref 6.4–8.2)
RBC # BLD AUTO: 4.22 X10(6)UL
SODIUM SERPL-SCNC: 141 MMOL/L (ref 136–145)
TSI SER-ACNC: 2.57 MIU/ML (ref 0.36–3.74)
WBC # BLD AUTO: 8.4 X10(3) UL (ref 4–11)

## 2023-11-28 PROCEDURE — 99214 OFFICE O/P EST MOD 30 MIN: CPT | Performed by: NURSE PRACTITIONER

## 2023-11-28 PROCEDURE — 84443 ASSAY THYROID STIM HORMONE: CPT

## 2023-11-28 PROCEDURE — 83036 HEMOGLOBIN GLYCOSYLATED A1C: CPT

## 2023-11-28 PROCEDURE — 36415 COLL VENOUS BLD VENIPUNCTURE: CPT

## 2023-11-28 PROCEDURE — 80053 COMPREHEN METABOLIC PANEL: CPT

## 2023-11-28 PROCEDURE — 85025 COMPLETE CBC W/AUTO DIFF WBC: CPT

## 2023-11-28 RX ORDER — SEMAGLUTIDE 0.68 MG/ML
0.25 INJECTION, SOLUTION SUBCUTANEOUS WEEKLY
Qty: 3 ML | Refills: 0 | Status: SHIPPED | OUTPATIENT
Start: 2023-11-28 | End: 2023-12-28

## 2023-12-15 RX ORDER — CELECOXIB 200 MG/1
200 CAPSULE ORAL EVERY MORNING
Qty: 30 CAPSULE | Refills: 0 | Status: SHIPPED | OUTPATIENT
Start: 2023-12-15

## 2023-12-15 NOTE — TELEPHONE ENCOUNTER
Medication(s) to Refill:   Requested Prescriptions     Pending Prescriptions Disp Refills    CELECOXIB 200 MG Oral Cap [Pharmacy Med Name: Celecoxib 200 Mg Cap Nort] 30 capsule 0     Sig: TAKE ONE CAPSULE BY MOUTH EVERY MORNING         Reason for Medication Refill being sent to Provider / Reason Protocol Failed:  [] 90 day refill has already been granted  [] Blood Pressure out of range  [] Labs Abnormal/over due  [] Medication not previously prescribed by Provider  [x] Non-Protocol Medication  [] Controlled Substance   [] Due for appointment- no future appointment scheduled  [] No Follow up specified      Last Time Medication was Filled: 10/13/2023      Last Office Visit with PCP: 11/28/2023  When Patient was Due Back to the Office:  4 weeks   (from when PCP last addressed condition)    Future Appointments:  Future Appointments   Date Time Provider Tom Frank   1/3/2024 12:20 PM Olinda Gomes MD [facility] [place]         Last Blood Pressures:  BP Readings from Last 2 Encounters:   11/28/23 158/74   09/27/23 110/76     Action taken:  [] Refill approved per protocol  [x] Routing to provider for approval

## 2023-12-18 ENCOUNTER — TELEPHONE (OUTPATIENT)
Dept: FAMILY MEDICINE CLINIC | Facility: CLINIC | Age: 68
End: 2023-12-18

## 2023-12-18 NOTE — TELEPHONE ENCOUNTER
L knee surgery @ [facility] with Dr. hank solano on 03/18/23 pink sheet has been routed to pcp and medical clearance will be faxed

## 2023-12-25 DIAGNOSIS — E78.00 PURE HYPERCHOLESTEROLEMIA: ICD-10-CM

## 2023-12-26 RX ORDER — PRAVASTATIN SODIUM 20 MG
20 TABLET ORAL EVERY EVENING
Qty: 90 TABLET | Refills: 0 | Status: SHIPPED | OUTPATIENT
Start: 2023-12-26

## 2024-01-03 DIAGNOSIS — E78.00 PURE HYPERCHOLESTEROLEMIA: ICD-10-CM

## 2024-01-03 PROBLEM — E66.813 CLASS 3 SEVERE OBESITY WITH SERIOUS COMORBIDITY AND BODY MASS INDEX (BMI) OF 40.0 TO 44.9 IN ADULT, UNSPECIFIED OBESITY TYPE: Status: ACTIVE | Noted: 2021-02-22

## 2024-01-03 PROBLEM — E66.813 CLASS 3 SEVERE OBESITY WITH SERIOUS COMORBIDITY AND BODY MASS INDEX (BMI) OF 40.0 TO 44.9 IN ADULT, UNSPECIFIED OBESITY TYPE (HCC): Status: ACTIVE | Noted: 2021-02-22

## 2024-01-03 PROBLEM — E66.01 CLASS 3 SEVERE OBESITY WITH SERIOUS COMORBIDITY AND BODY MASS INDEX (BMI) OF 40.0 TO 44.9 IN ADULT, UNSPECIFIED OBESITY TYPE (HCC): Status: ACTIVE | Noted: 2021-02-22

## 2024-01-03 PROBLEM — M87.00 AVN (AVASCULAR NECROSIS OF BONE) (HCC): Status: ACTIVE | Noted: 2024-01-03

## 2024-01-03 RX ORDER — PRAVASTATIN SODIUM 20 MG
20 TABLET ORAL EVERY EVENING
Qty: 90 TABLET | Refills: 0 | OUTPATIENT
Start: 2024-01-03

## 2024-01-05 ENCOUNTER — TELEPHONE (OUTPATIENT)
Dept: FAMILY MEDICINE CLINIC | Facility: CLINIC | Age: 69
End: 2024-01-05

## 2024-01-05 DIAGNOSIS — E78.00 PURE HYPERCHOLESTEROLEMIA: ICD-10-CM

## 2024-01-05 RX ORDER — PRAVASTATIN SODIUM 20 MG
20 TABLET ORAL EVERY EVENING
Qty: 90 TABLET | Refills: 0 | OUTPATIENT
Start: 2024-01-05

## 2024-01-09 ENCOUNTER — PATIENT MESSAGE (OUTPATIENT)
Dept: FAMILY MEDICINE CLINIC | Facility: CLINIC | Age: 69
End: 2024-01-09

## 2024-01-09 NOTE — TELEPHONE ENCOUNTER
From: Yumi De Los Santos  To: Maritza Wilson  Sent: 1/9/2024 11:05 AM CST  Subject: Ozempic     Burke said yesterday the delay is between doctor and insurance now. Burke sent paperwork to doctor last week, they said, have you seen anything? When Keke submitted the original prescription it went through the same afternoon, not sure what the difference is this time. I am due for my injection tomorrow morning. Sorry to bother you but I’m on a time line. I also mailed the lymphedema clinic info to you later park of the week.  Thanks   Yumi

## 2024-01-10 ENCOUNTER — TELEPHONE (OUTPATIENT)
Dept: FAMILY MEDICINE CLINIC | Facility: CLINIC | Age: 69
End: 2024-01-10

## 2024-01-10 NOTE — TELEPHONE ENCOUNTER
Oxempic PA was denied. Joyce is calling from appeals department. Stated that she has some questions. It is possible that if we get additional information that this medication may be approved     Reference number [id number]     See patient message from 1/9/24

## 2024-01-10 NOTE — TELEPHONE ENCOUNTER
See patient message dated 1/9/2024. Patient already advised to contact insurance to see what weight loss medication is covered.

## 2024-01-15 RX ORDER — CELECOXIB 200 MG/1
200 CAPSULE ORAL EVERY MORNING
Qty: 30 CAPSULE | Refills: 0 | Status: SHIPPED | OUTPATIENT
Start: 2024-01-15

## 2024-02-12 DIAGNOSIS — R60.0 EDEMA OF BOTH LEGS: Primary | ICD-10-CM

## 2024-02-13 RX ORDER — BUMETANIDE 2 MG/1
4 TABLET ORAL EVERY MORNING
Qty: 180 TABLET | Refills: 0 | Status: SHIPPED | OUTPATIENT
Start: 2024-02-13

## 2024-02-13 RX ORDER — METOPROLOL SUCCINATE 50 MG/1
50 TABLET, EXTENDED RELEASE ORAL DAILY
Qty: 90 TABLET | Refills: 0 | Status: SHIPPED | OUTPATIENT
Start: 2024-02-13

## 2024-02-13 RX ORDER — POTASSIUM CHLORIDE 750 MG/1
10 TABLET, FILM COATED, EXTENDED RELEASE ORAL 2 TIMES DAILY
Qty: 180 TABLET | Refills: 0 | Status: SHIPPED | OUTPATIENT
Start: 2024-02-13

## 2024-02-13 NOTE — TELEPHONE ENCOUNTER
LF: 11/14/23  LOV: 1/3/24    
Detail Level: Detailed
Quality 226: Preventive Care And Screening: Tobacco Use: Screening And Cessation Intervention: Patient screened for tobacco use and is an ex/non-smoker
Quality 431: Preventive Care And Screening: Unhealthy Alcohol Use - Screening: Patient screened for unhealthy alcohol use using a single question and scores less than 2 times per year

## 2024-02-16 ENCOUNTER — LAB ENCOUNTER (OUTPATIENT)
Dept: LAB | Age: 69
End: 2024-02-16
Attending: INTERNAL MEDICINE
Payer: MEDICARE

## 2024-02-16 DIAGNOSIS — E03.9 ACQUIRED HYPOTHYROIDISM: ICD-10-CM

## 2024-02-16 DIAGNOSIS — Z86.018 HISTORY OF PITUITARY ADENOMA: Primary | ICD-10-CM

## 2024-02-16 LAB — PROLACTIN SERPL-MCNC: 10 NG/ML

## 2024-02-16 PROCEDURE — 36415 COLL VENOUS BLD VENIPUNCTURE: CPT

## 2024-02-16 PROCEDURE — 84146 ASSAY OF PROLACTIN: CPT

## 2024-02-16 RX ORDER — CELECOXIB 200 MG/1
200 CAPSULE ORAL EVERY MORNING
Qty: 30 CAPSULE | Refills: 0 | Status: SHIPPED | OUTPATIENT
Start: 2024-02-16 | End: 2024-04-23

## 2024-02-21 ENCOUNTER — OFFICE VISIT (OUTPATIENT)
Dept: FAMILY MEDICINE CLINIC | Facility: CLINIC | Age: 69
End: 2024-02-21
Payer: MEDICARE

## 2024-02-21 VITALS
HEIGHT: 67 IN | DIASTOLIC BLOOD PRESSURE: 76 MMHG | HEART RATE: 55 BPM | SYSTOLIC BLOOD PRESSURE: 138 MMHG | WEIGHT: 262 LBS | OXYGEN SATURATION: 99 % | BODY MASS INDEX: 41.12 KG/M2

## 2024-02-21 DIAGNOSIS — I10 ESSENTIAL HYPERTENSION, BENIGN: ICD-10-CM

## 2024-02-21 DIAGNOSIS — R60.0 EDEMA OF BOTH LEGS: ICD-10-CM

## 2024-02-21 DIAGNOSIS — Z01.818 PREOP EXAMINATION: Primary | ICD-10-CM

## 2024-02-21 DIAGNOSIS — E78.00 PURE HYPERCHOLESTEROLEMIA: ICD-10-CM

## 2024-02-21 DIAGNOSIS — M17.12 ARTHRITIS OF LEFT KNEE: ICD-10-CM

## 2024-02-21 DIAGNOSIS — E55.9 VITAMIN D DEFICIENCY: ICD-10-CM

## 2024-02-21 DIAGNOSIS — E66.01 CLASS 3 SEVERE OBESITY WITH SERIOUS COMORBIDITY AND BODY MASS INDEX (BMI) OF 40.0 TO 44.9 IN ADULT, UNSPECIFIED OBESITY TYPE (HCC): ICD-10-CM

## 2024-02-21 PROCEDURE — 99215 OFFICE O/P EST HI 40 MIN: CPT | Performed by: FAMILY MEDICINE

## 2024-02-21 NOTE — PROGRESS NOTES
CC: Preop exam.      HPI:  Yumi De Los Santos is a 68 year old female who presents for a pre-operative physical exam  By Dr.Nikhil reardon. Patient is to have left knee replacement,secondary to severe OA to be on 3/18/24.    No prior anaesthesia problem.  Pt complains of left knee pain severe, last years, not able to work, movements make it worse, pt has severe pain, sees pain clinic.  SAMANTHA: on Cpap machine, doing well.  Patient presents for recheck of hypertension. Pt has been taking medications as instructed, no medication side effects, home BP monitoring in the range of 110-120's systolic and 70-80's diastolic.  Chronic edema of both legs: getting much better.  Pt presents for recheck of hyperlipidemia. Patient reports taking medications as instructed, no medication side effects noted. Denies any generalized muscle aches.  Pt had a history of hypothyroidism and here to recheck. Has been tolerating the medication well. Last TSH was year ago, needs repeat. Denies any shakiness, palpitations, increased BM's or wgt loss. Pt is loosing weight on healthy diet and Ozempic.    Current Outpatient Medications   Medication Sig Dispense Refill    celecoxib 200 MG Oral Cap Take 1 capsule (200 mg total) by mouth every morning. 30 capsule 0    Potassium Chloride ER 10 MEQ Oral Tab CR Take 1 tablet (10 mEq total) by mouth 2 (two) times daily. 180 tablet 0    BUMETANIDE 2 MG Oral Tab TAKE TWO TABLETS BY MOUTH EVERY MORNING 180 tablet 0    METOPROLOL SUCCINATE ER 50 MG Oral Tablet 24 Hr TAKE ONE TABLET BY MOUTH ONCE DAILY 90 tablet 0    semaglutide (OZEMPIC, 0.25 OR 0.5 MG/DOSE,) 2 MG/3ML Subcutaneous Solution Pen-injector Inject 0.5 mg into the skin once a week. 1 each 3    pravastatin 20 MG Oral Tab Take 1 tablet (20 mg total) by mouth every evening. 90 tablet 0    levothyroxine 137 MCG Oral Tab Take 137 mcg by mouth daily.      aspirin 325 MG Oral Tab Take 1 tablet (325 mg total) by mouth daily.      HYDROcodone-acetaminophen   MG Oral Tab 1 tab po q4 hr prn-30 days 180 tablet 0    Vitamin D3 (VITAMIN D3) 2000 UNITS Oral Cap Take 1 capsule (2,000 Units total) by mouth daily.        Allergies:   Allergies   Allergen Reactions    Adhesive Tape RASH and ITCHING    Grass ITCHING and OTHER (SEE COMMENTS)     Headaches with blood shot eyes    Latex ITCHING    Other      Patient experiences a yeast infection with any antibiotic administration please treat for yeast infection whenever starting  An antibiotic      Past Medical History:   Diagnosis Date    Allergic rhinitis     Anxiety     Arrhythmia     A-fib    Arthritis     Back problem     Cancer (HCC) 2012    skin cancer removed  from leg    Cellulitis     Rt. leg ankle    Cellulitis     Depression     Esophageal reflux     Essential hypertension, benign 2012    Hip arthrosis     Hyperthyroidism     Migraines     Obesity     Osteoarthrosis, unspecified whether generalized or localized, unspecified site     PONV (postoperative nausea and vomiting)     Unspecified disorder of thyroid     Hashimoto's     Unspecified sleep apnea     Visual impairment       Past Surgical History:   Procedure Laterality Date    ADENOIDECTOMY      CATARACT Bilateral 2021    FOOT/TOES SURGERY PROC UNLISTED  2005    bunion, right foot hammertoe    HERNIA SURGERY      repair of umbilical hernia    HIP REPLACEMENT SURGERY   &     Left , right     HYSTERECTOMY  2000          OTHER SURGICAL HISTORY  2010    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)    OTHER SURGICAL HISTORY Right 2016    Procedure: MUSCLE BIOPSY ORTHOPEDIC;  Surgeon: Jovani Powell MD;  Location:  MAIN OR    OTHER SURGICAL HISTORY Bilateral 2016    Procedure: SACROILIAC JOINT INJECTION BILATERAL;  Surgeon: Lj Vela MD;  Location:  MAIN OR    SKIN SURGERY  2021    facial     TONSILLECTOMY  1965    TOTAL HIP REPLACEMENT Left     TUBAL LIGATION      UPPER ARM/ELBOW  SURGERY UNLISTED  01/01/1996    left      Family History   Problem Relation Age of Onset    Hypertension Mother     Asthma Mother     Hypertension Father     Cancer Sister       Social History:   Social History     Socioeconomic History    Marital status:    Tobacco Use    Smoking status: Every Day     Packs/day: 0.50     Years: 32.00     Additional pack years: 0.00     Total pack years: 16.00     Types: Cigarettes    Smokeless tobacco: Never    Tobacco comments:     1/2 pack daily   Vaping Use    Vaping Use: Never used   Substance and Sexual Activity    Alcohol use: No     Comment: rare    Drug use: No   Other Topics Concern    Caffeine Concern Yes    Exercise No    Seat Belt Yes      Occ: retired  Exercise: minimal.  Diet: watches fats closely     REVIEW OF SYSTEMS:   GENERAL: feels well otherwise  SKIN: denies any unusual skin lesions  EYES:denies blurred vision or double vision  HEENT: denies nasal congestion, sinus pain or ST  LUNGS: denies shortness of breath with exertion  CARDIOVASCULAR: denies chest pain on exertion  GI: denies abdominal pain,denies heartburn  : no abnormal discharge  MUSCULOSKELETAL: denies back pain  NEURO: denies headaches  PSYCHE: denies depression or anxiety  HEMATOLOGIC: denies hx of anemia  ENDOCRINE: denies thyroid history  ALL/ASTHMA: denies hx of allergy or asthma    EXAM:   /76   Pulse 55   Ht 5' 7\" (1.702 m)   Wt 262 lb (118.8 kg)   LMP 01/20/2001 (Approximate)   SpO2 99%   BMI 41.04 kg/m²   GENERAL: well developed, well nourished,in no apparent distress  SKIN: no rashes,no suspicious lesions  HEENT: atraumatic, normocephalic,ears and throat are clear  EYES:PERRLA, EOMI, normal optic disk,conjunctiva are clear  NECK: supple,no adenopathy,no bruits  CHEST: no chest tenderness  LUNGS: clear to auscultation  CARDIO: RRR without murmur  GI: good BS's,no masses, HSM or tenderness  : deferred  L knee: limited ROM.  EXTREMITIES: no cyanosis, clubbing or  edema  NEURO: Oriented times three,cranial nerves are intact,motor and sensory are grossly intact    ASSESSMENT AND PLAN:   Yumi De Los Santos is a 68 year old female who presents for a pre-operative physical exam for left knee replacement secondary to severe left knee OA:    Acquired hypothyroidism: TSH normal.    Pure hypercholesterolemia  Essential hypertension, benign  All stable.Low lipid, low salt diet.    Edema of both legs: Improving.    Obstructive sleep apnea (adult): doing well.    Pt approved for surgery, sees cardiologist for EKG and cardiac clearance.  Total time revieweing specialists notes, tests: 40 min.

## 2024-02-29 ENCOUNTER — HOSPITAL ENCOUNTER (OUTPATIENT)
Dept: GENERAL RADIOLOGY | Age: 69
Discharge: HOME | End: 2024-02-29
Attending: FAMILY MEDICINE
Payer: MEDICARE

## 2024-02-29 ENCOUNTER — LAB ENCOUNTER (OUTPATIENT)
Dept: LAB | Age: 69
End: 2024-02-29
Attending: FAMILY MEDICINE
Payer: MEDICARE

## 2024-02-29 DIAGNOSIS — Z01.818 PREOP EXAMINATION: ICD-10-CM

## 2024-02-29 DIAGNOSIS — E78.00 PURE HYPERCHOLESTEROLEMIA: ICD-10-CM

## 2024-02-29 DIAGNOSIS — R60.0 EDEMA OF BOTH LEGS: ICD-10-CM

## 2024-02-29 DIAGNOSIS — E55.9 VITAMIN D DEFICIENCY: ICD-10-CM

## 2024-02-29 LAB
ALBUMIN SERPL-MCNC: 3.9 G/DL (ref 3.4–5)
ALBUMIN/GLOB SERPL: 1.3 {RATIO} (ref 1–2)
ALP LIVER SERPL-CCNC: 70 U/L
ALT SERPL-CCNC: 18 U/L
ANION GAP SERPL CALC-SCNC: 1 MMOL/L (ref 0–18)
AST SERPL-CCNC: 17 U/L (ref 15–37)
BASOPHILS # BLD AUTO: 0.02 X10(3) UL (ref 0–0.2)
BASOPHILS NFR BLD AUTO: 0.3 %
BILIRUB SERPL-MCNC: 0.4 MG/DL (ref 0.1–2)
BILIRUB UR QL STRIP.AUTO: NEGATIVE
BUN BLD-MCNC: 20 MG/DL (ref 9–23)
CALCIUM BLD-MCNC: 9.5 MG/DL (ref 8.5–10.1)
CHLORIDE SERPL-SCNC: 108 MMOL/L (ref 98–112)
CHOLEST SERPL-MCNC: 174 MG/DL (ref ?–200)
CO2 SERPL-SCNC: 31 MMOL/L (ref 21–32)
COLOR UR AUTO: YELLOW
CREAT BLD-MCNC: 1.09 MG/DL
EGFRCR SERPLBLD CKD-EPI 2021: 56 ML/MIN/1.73M2 (ref 60–?)
EOSINOPHIL # BLD AUTO: 0.18 X10(3) UL (ref 0–0.7)
EOSINOPHIL NFR BLD AUTO: 3 %
ERYTHROCYTE [DISTWIDTH] IN BLOOD BY AUTOMATED COUNT: 14.9 %
FASTING PATIENT LIPID ANSWER: YES
FASTING STATUS PATIENT QL REPORTED: YES
GLOBULIN PLAS-MCNC: 3.1 G/DL (ref 2.8–4.4)
GLUCOSE BLD-MCNC: 74 MG/DL (ref 70–99)
GLUCOSE UR STRIP.AUTO-MCNC: NORMAL MG/DL
HCT VFR BLD AUTO: 40 %
HDLC SERPL-MCNC: 48 MG/DL (ref 40–59)
HGB BLD-MCNC: 12.6 G/DL
IMM GRANULOCYTES # BLD AUTO: 0.02 X10(3) UL (ref 0–1)
IMM GRANULOCYTES NFR BLD: 0.3 %
KETONES UR STRIP.AUTO-MCNC: NEGATIVE MG/DL
LDLC SERPL CALC-MCNC: 104 MG/DL (ref ?–100)
LEUKOCYTE ESTERASE UR QL STRIP.AUTO: NEGATIVE
LYMPHOCYTES # BLD AUTO: 1.58 X10(3) UL (ref 1–4)
LYMPHOCYTES NFR BLD AUTO: 26 %
MCH RBC QN AUTO: 30.7 PG (ref 26–34)
MCHC RBC AUTO-ENTMCNC: 31.5 G/DL (ref 31–37)
MCV RBC AUTO: 97.6 FL
MONOCYTES # BLD AUTO: 0.44 X10(3) UL (ref 0.1–1)
MONOCYTES NFR BLD AUTO: 7.2 %
NEUTROPHILS # BLD AUTO: 3.83 X10 (3) UL (ref 1.5–7.7)
NEUTROPHILS # BLD AUTO: 3.83 X10(3) UL (ref 1.5–7.7)
NEUTROPHILS NFR BLD AUTO: 63.2 %
NITRITE UR QL STRIP.AUTO: NEGATIVE
NONHDLC SERPL-MCNC: 126 MG/DL (ref ?–130)
OSMOLALITY SERPL CALC.SUM OF ELEC: 291 MOSM/KG (ref 275–295)
PH UR STRIP.AUTO: 6.5 [PH] (ref 5–8)
PLATELET # BLD AUTO: 257 10(3)UL (ref 150–450)
POTASSIUM SERPL-SCNC: 4.8 MMOL/L (ref 3.5–5.1)
PROT SERPL-MCNC: 7 G/DL (ref 6.4–8.2)
PROT UR STRIP.AUTO-MCNC: 50 MG/DL
RBC # BLD AUTO: 4.1 X10(6)UL
RBC UR QL AUTO: NEGATIVE
SODIUM SERPL-SCNC: 140 MMOL/L (ref 136–145)
SP GR UR STRIP.AUTO: >1.03 (ref 1–1.03)
TRIGL SERPL-MCNC: 120 MG/DL (ref 30–149)
TSI SER-ACNC: 1.4 MIU/ML (ref 0.36–3.74)
UROBILINOGEN UR STRIP.AUTO-MCNC: NORMAL MG/DL
VIT D+METAB SERPL-MCNC: 40.3 NG/ML (ref 30–100)
VLDLC SERPL CALC-MCNC: 20 MG/DL (ref 0–30)
WBC # BLD AUTO: 6.1 X10(3) UL (ref 4–11)

## 2024-02-29 PROCEDURE — 84443 ASSAY THYROID STIM HORMONE: CPT

## 2024-02-29 PROCEDURE — 81001 URINALYSIS AUTO W/SCOPE: CPT

## 2024-02-29 PROCEDURE — 36415 COLL VENOUS BLD VENIPUNCTURE: CPT

## 2024-02-29 PROCEDURE — 87081 CULTURE SCREEN ONLY: CPT

## 2024-02-29 PROCEDURE — 80061 LIPID PANEL: CPT

## 2024-02-29 PROCEDURE — 71046 X-RAY EXAM CHEST 2 VIEWS: CPT | Performed by: FAMILY MEDICINE

## 2024-02-29 PROCEDURE — 85025 COMPLETE CBC W/AUTO DIFF WBC: CPT

## 2024-02-29 PROCEDURE — 82306 VITAMIN D 25 HYDROXY: CPT

## 2024-02-29 PROCEDURE — 80053 COMPREHEN METABOLIC PANEL: CPT

## 2024-03-04 DIAGNOSIS — R80.9 PROTEINURIA, UNSPECIFIED TYPE: Primary | ICD-10-CM

## 2024-03-13 ENCOUNTER — TELEPHONE (OUTPATIENT)
Dept: FAMILY MEDICINE CLINIC | Facility: CLINIC | Age: 69
End: 2024-03-13

## 2024-03-13 RX ORDER — ASPIRIN,CAFFEINE 35.5; 5 MG/1; MG/1
1 TABLET ORAL EVERY MORNING
COMMUNITY
Start: 2024-03-13

## 2024-03-13 NOTE — TELEPHONE ENCOUNTER
Sorry for the confusion with the previous message.     Patient is scheduled for surgery on 3/18/2024. Patient would like to know if and when she should hold any of her medications prior to surgery. (Already stopped the Ozempic and Basim back & body.)

## 2024-03-13 NOTE — TELEPHONE ENCOUNTER
Pt called and said she has surgery on 3/18/24, she was originally told by her surgeon that she would get a phone call about what meds she should stop taking, they never called and when she finally called them they told her that her PCP is the one who is supposed to tell her what meds to stop before surgery.

## 2024-03-13 NOTE — TELEPHONE ENCOUNTER
Patient asking when she should start holding medications.   Medication list updated.     Ozempic- last dose Thursday, 3/7/24  Basim back and body- last dose Saturday, 3/9/2024

## 2024-03-13 NOTE — TELEPHONE ENCOUNTER
Nobody has it, alternative, private weigh loss clinics, Dr. Villanueva in [place] does shots in her office.

## 2024-03-14 NOTE — TELEPHONE ENCOUNTER
Usually surgical team tells pt what take what not but basically same thing she did before her other ortho surgery, no supplements no fish oil, no NSAIDs, no Aspirin 7-10 days before.

## 2024-03-25 DIAGNOSIS — E78.00 PURE HYPERCHOLESTEROLEMIA: ICD-10-CM

## 2024-03-25 RX ORDER — PRAVASTATIN SODIUM 20 MG
20 TABLET ORAL EVERY EVENING
Qty: 90 TABLET | Refills: 0 | Status: SHIPPED | OUTPATIENT
Start: 2024-03-25 | End: 2024-06-25

## 2024-04-17 NOTE — TELEPHONE ENCOUNTER
Patient returned call, agreeable to scheduling SCS Trial. Procedure scheduled, pre-procedure instructions reviewed. Patient reminded to have  available for procedure, as well as to fast for 8 hours prior.  Patient advised to hold Naproxen and Meloxica ? Please bring your Insurance Card, Photo ID, List of Current Medications and Referral (if applicable) to your appointment. Check in at [facility] ([address] [place] [address]) outpatient registration in the [place].   ? Please note-No p Please contact your insurance carrier to determine what your financial  responsibility will be for the procedure(s).     Cancellation/Rescheduling Appointment:   In the event you need to cancel or reschedule your appointment, you must notify the office 24 Yes

## 2024-04-23 RX ORDER — CELECOXIB 200 MG/1
200 CAPSULE ORAL EVERY MORNING
Qty: 30 CAPSULE | Refills: 0 | Status: SHIPPED | OUTPATIENT
Start: 2024-04-23 | End: 2024-05-28

## 2024-04-26 ENCOUNTER — LAB ENCOUNTER (OUTPATIENT)
Dept: LAB | Age: 69
End: 2024-04-26
Attending: FAMILY MEDICINE
Payer: MEDICARE

## 2024-04-26 DIAGNOSIS — R80.9 PROTEINURIA, UNSPECIFIED TYPE: ICD-10-CM

## 2024-04-26 LAB
BILIRUB UR QL STRIP.AUTO: NEGATIVE
CLARITY UR REFRACT.AUTO: CLEAR
GLUCOSE UR STRIP.AUTO-MCNC: NORMAL MG/DL
KETONES UR STRIP.AUTO-MCNC: NEGATIVE MG/DL
LEUKOCYTE ESTERASE UR QL STRIP.AUTO: NEGATIVE
NITRITE UR QL STRIP.AUTO: NEGATIVE
PH UR STRIP.AUTO: 5.5 [PH] (ref 5–8)
PROT UR STRIP.AUTO-MCNC: NEGATIVE MG/DL
RBC UR QL AUTO: NEGATIVE
SP GR UR STRIP.AUTO: 1.02 (ref 1–1.03)
UROBILINOGEN UR STRIP.AUTO-MCNC: NORMAL MG/DL

## 2024-04-26 PROCEDURE — 81003 URINALYSIS AUTO W/O SCOPE: CPT

## 2024-04-29 ENCOUNTER — TELEPHONE (OUTPATIENT)
Dept: FAMILY MEDICINE CLINIC | Facility: CLINIC | Age: 69
End: 2024-04-29

## 2024-04-29 RX ORDER — SENNOSIDES 8.6 MG
650 CAPSULE ORAL 2 TIMES DAILY
COMMUNITY
Start: 2023-05-31

## 2024-04-29 RX ORDER — AMOXICILLIN 500 MG/1
2000 CAPSULE ORAL SEE ADMIN INSTRUCTIONS
COMMUNITY
Start: 2023-09-13

## 2024-04-29 NOTE — TELEPHONE ENCOUNTER
COMPREHENSIVE MEDICATION REVIEW         Yumi De Los Santos MRN [id number]    1955 PCP Maritza Wilson MD     Comments: Medication history completed by [facility]ulatory [facility] Pharmacist over the phone on 24. Patient has upcoming AWV with PCP on 24.     After thorough medication review, 12 discrepancies have been identified and corrected on patient's medication list. See updated list below:     Outpatient Encounter Medications as of 2024   Medication Sig    semaglutide 2 MG/3ML Subcutaneous Solution Pen-injector Inject 0.25 mg into the skin once a week.    Acetaminophen ER (TYLENOL 8 HOUR) 650 MG Oral Tab CR Take 1 tablet (650 mg total) by mouth 2 (two) times daily.    amoxicillin 500 MG Oral Cap Take 4 capsules (2,000 mg total) by mouth See Admin Instructions. Prior to dental appointments    celecoxib 200 MG Oral Cap Take 1 capsule (200 mg total) by mouth every morning.    pravastatin 20 MG Oral Tab Take 1 tablet (20 mg total) by mouth every evening.    Esomeprazole Magnesium (NEXIUM) 20 MG Oral Capsule Delayed Release Take 1 capsule (20 mg total) by mouth every morning before breakfast.    Aspirin-Caffeine (RONY BACK & BODY) 500-32.5 MG Oral Tab Take 1 tablet by mouth every morning.    Melatonin 10 MG Oral Chew Tab Chew 20 mg by mouth at bedtime.    Potassium Chloride ER 10 MEQ Oral Tab CR Take 1 tablet (10 mEq total) by mouth 2 (two) times daily.    BUMETANIDE 2 MG Oral Tab TAKE TWO TABLETS BY MOUTH EVERY MORNING    METOPROLOL SUCCINATE ER 50 MG Oral Tablet 24 Hr TAKE ONE TABLET BY MOUTH ONCE DAILY    levothyroxine 137 MCG Oral Tab Take 137 mcg by mouth daily.    HYDROcodone-acetaminophen  MG Oral Tab 1 tab po q4 hr prn-30 days    Vitamin D3 (VITAMIN D3) 2000 UNITS Oral Cap Take 1 capsule (2,000 Units total) by mouth daily.     Medication Assessment:   Reviewed all medications in detail with patient including dose, indication, timing of administration, monitoring parameters, and potential side  effects of medications.     Patient reports taking bumetanide 4 mg (2 tablets) daily, metoprolol succinate ER 50 mg daily and potassium chloride ER 10 mEq twice daily as prescribed. She does NOT monitor her blood pressure at home. Did recommend she monitor her blood pressure 2-3 times weekly and bring readings to all MD appointments for review.     Patient tells me she is taking Ozempic 0.25 mg weekly. She is down to her last dose and plans to discuss renewing this prescription with PCP at appointment on Wednesday.     Patient also tells me she is taking Norco  mg - 1 tablet every 3-4 hours as needed for pain (6 tablets/day). In addition to this, patient reports taking Tylenol ES Arthritis 2 tablets twice daily. Did discuss with patient the recommended maximum amount of Tylenol in 24 hours in 3000 mg. Did recommend for patient to reduce the Tylenol Arthritis to 1 tablet twice daily (vs 2 tablets) to reduce the total amount of acetaminophen in 24 hours. Patient confirmed understanding.     Did provide education and stressed the importance of taking medication just like prescribed to get the most benefit. Patient denies forgetting or missing medication doses. Patient denies any other questions or concerns with medications at this time.     Thank you,    Tootie Rodriguez, PharmD, 4/29/2024, 11:46 AM

## 2024-04-29 NOTE — TELEPHONE ENCOUNTER
DILLONM for pt to CB. Dr. Wilson would like the name of the lymphedema therapist or specialist that she sees so that she can refer her pts to see them.

## 2024-05-01 ENCOUNTER — OFFICE VISIT (OUTPATIENT)
Dept: FAMILY MEDICINE CLINIC | Facility: CLINIC | Age: 69
End: 2024-05-01
Payer: MEDICARE

## 2024-05-01 ENCOUNTER — LAB ENCOUNTER (OUTPATIENT)
Dept: LAB | Age: 69
End: 2024-05-01
Attending: FAMILY MEDICINE
Payer: MEDICARE

## 2024-05-01 ENCOUNTER — TELEPHONE (OUTPATIENT)
Dept: FAMILY MEDICINE CLINIC | Facility: CLINIC | Age: 69
End: 2024-05-01

## 2024-05-01 VITALS
HEIGHT: 67 IN | SYSTOLIC BLOOD PRESSURE: 110 MMHG | HEART RATE: 65 BPM | WEIGHT: 255 LBS | DIASTOLIC BLOOD PRESSURE: 74 MMHG | BODY MASS INDEX: 40.02 KG/M2 | RESPIRATION RATE: 18 BRPM | OXYGEN SATURATION: 99 %

## 2024-05-01 DIAGNOSIS — E78.00 PURE HYPERCHOLESTEROLEMIA: ICD-10-CM

## 2024-05-01 DIAGNOSIS — G47.33 OBSTRUCTIVE SLEEP APNEA (ADULT) (PEDIATRIC): ICD-10-CM

## 2024-05-01 DIAGNOSIS — E03.9 ACQUIRED HYPOTHYROIDISM: Chronic | ICD-10-CM

## 2024-05-01 DIAGNOSIS — L98.9 SKIN SORE: ICD-10-CM

## 2024-05-01 DIAGNOSIS — L03.116 CELLULITIS OF LEFT LOWER EXTREMITY: ICD-10-CM

## 2024-05-01 DIAGNOSIS — I10 ESSENTIAL HYPERTENSION, BENIGN: ICD-10-CM

## 2024-05-01 DIAGNOSIS — Z12.31 ENCOUNTER FOR SCREENING MAMMOGRAM FOR BREAST CANCER: ICD-10-CM

## 2024-05-01 DIAGNOSIS — F17.200 SMOKING: ICD-10-CM

## 2024-05-01 DIAGNOSIS — R60.0 EDEMA OF BOTH LEGS: ICD-10-CM

## 2024-05-01 DIAGNOSIS — Z96.652 STATUS POST LEFT KNEE REPLACEMENT: ICD-10-CM

## 2024-05-01 DIAGNOSIS — E66.01 MORBID OBESITY (HCC): ICD-10-CM

## 2024-05-01 DIAGNOSIS — R22.40 LOCALIZED SWELLING OF LOWER LEG: ICD-10-CM

## 2024-05-01 DIAGNOSIS — Z00.00 MEDICARE ANNUAL WELLNESS VISIT, SUBSEQUENT: Primary | ICD-10-CM

## 2024-05-01 DIAGNOSIS — M85.89 OSTEOPENIA OF MULTIPLE SITES: ICD-10-CM

## 2024-05-01 DIAGNOSIS — J30.1 SEASONAL ALLERGIC RHINITIS DUE TO POLLEN: ICD-10-CM

## 2024-05-01 DIAGNOSIS — Z86.018 HISTORY OF PITUITARY ADENOMA: ICD-10-CM

## 2024-05-01 PROBLEM — M87.00 AVN (AVASCULAR NECROSIS OF BONE) (HCC): Status: RESOLVED | Noted: 2024-01-03 | Resolved: 2024-05-01

## 2024-05-01 PROBLEM — M16.12 ARTHRITIS OF LEFT HIP: Status: RESOLVED | Noted: 2023-05-03 | Resolved: 2024-05-01

## 2024-05-01 PROBLEM — M16.11 ARTHRITIS OF RIGHT HIP: Status: RESOLVED | Noted: 2021-02-22 | Resolved: 2024-05-01

## 2024-05-01 LAB
BASOPHILS # BLD AUTO: 0.05 X10(3) UL (ref 0–0.2)
BASOPHILS NFR BLD AUTO: 0.7 %
D DIMER PPP FEU-MCNC: 2.35 UG/ML FEU (ref ?–0.67)
EOSINOPHIL # BLD AUTO: 0.24 X10(3) UL (ref 0–0.7)
EOSINOPHIL NFR BLD AUTO: 3.3 %
ERYTHROCYTE [DISTWIDTH] IN BLOOD BY AUTOMATED COUNT: 13.6 %
HCT VFR BLD AUTO: 38.9 %
HGB BLD-MCNC: 12.3 G/DL
IMM GRANULOCYTES # BLD AUTO: 0.02 X10(3) UL (ref 0–1)
IMM GRANULOCYTES NFR BLD: 0.3 %
LYMPHOCYTES # BLD AUTO: 2.01 X10(3) UL (ref 1–4)
LYMPHOCYTES NFR BLD AUTO: 27.7 %
MCH RBC QN AUTO: 31.1 PG (ref 26–34)
MCHC RBC AUTO-ENTMCNC: 31.6 G/DL (ref 31–37)
MCV RBC AUTO: 98.2 FL
MONOCYTES # BLD AUTO: 0.59 X10(3) UL (ref 0.1–1)
MONOCYTES NFR BLD AUTO: 8.1 %
NEUTROPHILS # BLD AUTO: 4.35 X10 (3) UL (ref 1.5–7.7)
NEUTROPHILS # BLD AUTO: 4.35 X10(3) UL (ref 1.5–7.7)
NEUTROPHILS NFR BLD AUTO: 59.9 %
PLATELET # BLD AUTO: 315 10(3)UL (ref 150–450)
RBC # BLD AUTO: 3.96 X10(6)UL
WBC # BLD AUTO: 7.3 X10(3) UL (ref 4–11)

## 2024-05-01 PROCEDURE — 85025 COMPLETE CBC W/AUTO DIFF WBC: CPT

## 2024-05-01 PROCEDURE — 36415 COLL VENOUS BLD VENIPUNCTURE: CPT

## 2024-05-01 PROCEDURE — 87070 CULTURE OTHR SPECIMN AEROBIC: CPT | Performed by: FAMILY MEDICINE

## 2024-05-01 PROCEDURE — 85379 FIBRIN DEGRADATION QUANT: CPT

## 2024-05-01 RX ORDER — PREDNISONE 20 MG/1
TABLET ORAL
Qty: 10 TABLET | Refills: 0 | Status: SHIPPED | OUTPATIENT
Start: 2024-05-01

## 2024-05-01 RX ORDER — LEVOFLOXACIN 500 MG/1
500 TABLET, FILM COATED ORAL DAILY
Qty: 7 TABLET | Refills: 0 | Status: SHIPPED | OUTPATIENT
Start: 2024-05-01

## 2024-05-02 ENCOUNTER — PATIENT MESSAGE (OUTPATIENT)
Dept: FAMILY MEDICINE CLINIC | Facility: CLINIC | Age: 69
End: 2024-05-02

## 2024-05-02 ENCOUNTER — HOSPITAL ENCOUNTER (OUTPATIENT)
Dept: ULTRASOUND IMAGING | Facility: HOSPITAL | Age: 69
Discharge: HOME | End: 2024-05-02
Attending: FAMILY MEDICINE
Payer: MEDICARE

## 2024-05-02 ENCOUNTER — TELEPHONE (OUTPATIENT)
Dept: FAMILY MEDICINE CLINIC | Facility: CLINIC | Age: 69
End: 2024-05-02

## 2024-05-02 DIAGNOSIS — R89.9 ABNORMAL LABORATORY TEST RESULT: Primary | ICD-10-CM

## 2024-05-02 DIAGNOSIS — R89.9 ABNORMAL LABORATORY TEST RESULT: ICD-10-CM

## 2024-05-02 DIAGNOSIS — R60.0 BILATERAL LEG EDEMA: ICD-10-CM

## 2024-05-02 PROBLEM — E78.00 PURE HYPERCHOLESTEROLEMIA: Status: ACTIVE | Noted: 2024-05-02

## 2024-05-02 PROBLEM — Z12.31 ENCOUNTER FOR SCREENING MAMMOGRAM FOR BREAST CANCER: Status: ACTIVE | Noted: 2024-05-02

## 2024-05-02 PROBLEM — Z96.652 STATUS POST LEFT KNEE REPLACEMENT: Status: ACTIVE | Noted: 2024-05-02

## 2024-05-02 PROCEDURE — 93970 EXTREMITY STUDY: CPT | Performed by: FAMILY MEDICINE

## 2024-05-02 NOTE — TELEPHONE ENCOUNTER
Please advise if pt needs to come back for another swab or just finish Levaquin & f/u if no improvement?

## 2024-05-02 NOTE — TELEPHONE ENCOUNTER
FYI-   Aerobic Bacterial Culture -Test can not be completed, sample was collected in wrong swab container.

## 2024-05-02 NOTE — TELEPHONE ENCOUNTER
----- Message from Maritza Wilson MD sent at 5/2/2024  3:10 PM CDT -----  US doppler bilateral legs stat.elevated D dimer.

## 2024-05-02 NOTE — PROGRESS NOTES
REASON FOR VISIT:    Yumi De Los Santos is a 68 year old female who presents for a Medicare Annual Wellness visit and swelling legs, skin sores.    HPI:     Patient Care Team: Patient Care Team:  Maritza Wilson MD as PCP - General (Family Practice)  Yobani Ace MD as Consulting Physician (NEUROSURGERY)  Lombardi, John A, MD (SURGERY, ORTHOPEDIC)  Yobani Murrieta MD (RHEUMATOLOGY)  Kiersten Hernandez MD (ENDOCRINOLOGY)  Mal Conner MD (CARDIOLOGY)  Lorie Workman MD (SURGERY, GENERAL)  Orville Garcia DO (Anesthesiology Pain Medicine)    Patient Active Problem List   Diagnosis    Obstructive sleep apnea (adult) (pediatric)    Essential hypertension, benign    Allergic rhinitis    Hypothyroidism    Edema of both legs    History of pituitary adenoma    Osteopenia of multiple sites    Class 3 severe obesity with serious comorbidity and body mass index (BMI) of 40.0 to 44.9 in adult, unspecified obesity type (HCC)    Smoking       Pt has had left knee replacement, healing well, 6 weeks, doing PT, now for few days her edema of lower legs are getting worse, left lower leg worse, redness, irritation, swelling, heaviness.not better with OCT meds.  Pt on full dose of ASA and Celebrex.  R upper inner thigh sore, pt states her PT noted it, some discomfort, no fever.    Current Outpatient Medications   Medication Sig Dispense Refill    levoFLOXacin (LEVAQUIN) 500 MG Oral Tab Take 1 tablet (500 mg total) by mouth daily. 7 tablet 0    predniSONE 20 MG Oral Tab Two tablets po qd for  5 days. 10 tablet 0    semaglutide 2 MG/3ML Subcutaneous Solution Pen-injector Inject 0.25 mg into the skin once a week.      Acetaminophen ER (TYLENOL 8 HOUR) 650 MG Oral Tab CR Take 1 tablet (650 mg total) by mouth 2 (two) times daily.      amoxicillin 500 MG Oral Cap Take 4 capsules (2,000 mg total) by mouth See Admin Instructions. Prior to dental appointments      celecoxib 200 MG Oral Cap Take 1 capsule (200 mg total) by mouth every  morning. 30 capsule 0    pravastatin 20 MG Oral Tab Take 1 tablet (20 mg total) by mouth every evening. 90 tablet 0    Esomeprazole Magnesium (NEXIUM) 20 MG Oral Capsule Delayed Release Take 1 capsule (20 mg total) by mouth every morning before breakfast.      Aspirin-Caffeine (RONY BACK & BODY) 500-32.5 MG Oral Tab Take 1 tablet by mouth every morning.      Melatonin 10 MG Oral Chew Tab Chew 20 mg by mouth at bedtime.      Potassium Chloride ER 10 MEQ Oral Tab CR Take 1 tablet (10 mEq total) by mouth 2 (two) times daily. 180 tablet 0    BUMETANIDE 2 MG Oral Tab TAKE TWO TABLETS BY MOUTH EVERY MORNING 180 tablet 0    METOPROLOL SUCCINATE ER 50 MG Oral Tablet 24 Hr TAKE ONE TABLET BY MOUTH ONCE DAILY 90 tablet 0    levothyroxine 137 MCG Oral Tab Take 137 mcg by mouth daily.      HYDROcodone-acetaminophen  MG Oral Tab 1 tab po q4 hr prn-30 days 180 tablet 0    Vitamin D3 (VITAMIN D3) 2000 UNITS Oral Cap Take 1 capsule (2,000 Units total) by mouth daily.             Latest Ref Rng & Units 2/29/2024     9:04 AM 11/28/2023    11:44 AM 5/8/2023     7:12 AM 1/5/2022     9:09 AM 1/9/2021    10:08 AM 11/12/2020     9:11 AM 9/5/2020     9:59 AM   Glucose and HbA1c   Glucose 70 - 99 mg/dL 74  87  91  88  82  104  84          Latest Ref Rng & Units 2/29/2024     9:04 AM 5/8/2023     7:12 AM 1/5/2022     9:09 AM 10/19/2019     8:12 AM 9/1/2012    10:17 AM   Cholesterol   Total Cholesterol <200 mg/dL 174  176  153  150  204    Triglycerides 30 - 149 mg/dL 120  108  79  70  85    HDL 40 - 59 mg/dL 48  49  53  48  46    LDL <100 mg/dL 104  107  85  88  141          Latest Ref Rng & Units 2/29/2024     9:04 AM 11/28/2023    11:44 AM 5/8/2023     7:12 AM 1/5/2022     9:09 AM 1/9/2021    10:08 AM 11/12/2020     9:11 AM 9/5/2020     9:59 AM   BUN and Cr   BUN 9 - 23 mg/dL 20  23  23  20  24  23.0  27    Creatinine 0.55 - 1.02 mg/dL 1.09  0.96  0.86  0.86  0.94  0.80  1.02          Latest Ref Rng & Units 2/29/2024     9:04 AM  11/28/2023    11:44 AM 5/8/2023     7:12 AM 1/5/2022     9:09 AM 1/9/2021    10:08 AM 9/5/2020     9:59 AM 10/19/2019     8:12 AM   AST and ALT   AST (SGOT) 15 - 37 U/L 17  19  15  17  20  14  19    ALT (SGPT) 13 - 56 U/L 18  17  20  23  24  20  19          Latest Ref Rng & Units 2/29/2024     9:04 AM 11/28/2023    11:44 AM 5/8/2023     7:12 AM 1/9/2021    10:08 AM 9/5/2020     9:59 AM 10/19/2019     8:12 AM 9/2/2017     8:01 AM   TSH and Free T4   TSH 0.358 - 3.740 mIU/mL 1.400  2.570  1.470  2.950  0.229  0.220  0.746    Free T4 0.8 - 1.7 ng/dL    1.3  1.7   1.7         General Health      In the past six months, have you lost more than 10 pounds without trying?: 2 - No  Has your appetite been poor?: No  Type of Diet: Low Salt;Low Carb  How does the patient maintain a good energy level?: Stretching  How would you describe your daily physical activity?: Light  How would you describe your current health state?: Good  How do you maintain positive mental well-being?: Puzzles;Games  Have you had any immunizations at another office such as Influenza, Hepatitis B, Tetanus, or Pneumococcal?: No     Functional Ability     Bathing or Showering: Able without help  Toileting: Able without help  Dressing: Able without help  Eating: Able without help  Driving: Able without help  Preparing your meals: Able without help  Managing money/bills: Able without help  Taking medications as prescribed: Able without help  Are you able to afford your medications?: Yes  Hearing Problems?: No     Functional Status     Hearing Problems?: No  Vision Problems? : No  Difficulty walking?: Yes  Difficulty dressing or bathing?: No  Problems with daily activities? : Yes  Memory Problems?: No      Fall/Risk Assessment         flow sheet        Depression Screening (PHQ-2/PHQ-9): Over the LAST 2 WEEKS       Flow sheet     Advance Directives     Do you have a healthcare power of ?: No  Do you have a living will?: No     Cognitive Assessment      What day of the week is this?: Correct  What month is it?: Correct  What year is it?: Correct  Recall \"Ball\": Correct  Recall \"Flag\": Correct  Recall \"Tree\": Correct         PREVENTATIVE SERVICES   INDICATIONS AND SCHEDULE Internal Lab or Procedure   Diabetes Screening     HbgA1C   Annually HGBA1C (%)   Date Value   09/01/2012 5.3     HgbA1C (%)   Date Value   11/28/2023 5.9 (H)       Fasting Blood Sugar (FSB)Annually Glucose (mg/dL)   Date Value   02/29/2024 74   11/12/2020 104     GLUCOSE (mg/dL)   Date Value   05/09/2014 115 (H)      Cardiovascular Disease Screening    LDL Annually LDL Cholesterol (mg/dL)   Date Value   02/29/2024 104 (H)     LDL CHOLESTROL (mg/dL)   Date Value   09/01/2012 141 (H)       EKG - w/ Initial Preventative Physical Exam only, or if medically necessary yes   Colorectal Cancer Screening     Colonoscopy Screen every 10 years Health Maintenance   Topic Date Due    Colorectal Cancer Screening  Never done       Flex Sigmoidoscopy Screen every 5 years No results found for this or any previous visit.    Fecal Occult Blood Annually No results found for: \"FOB\", \"OCCULTSTOOL\"   Glaucoma Screening     Ophthalmology Visit Annually yes   Immunizations     Zoster (Not covered by Medicare Part B) No orders found for this or any previous visit.     SPECIFIC DISEASE MONITORING Internal Lab or Procedure     Annual Monitoring of Persistent Medications  (ACE/ARB, Digoxin, Diuretics)        Potassium  Annually Potassium (mmol/L)   Date Value   02/29/2024 4.8   11/12/2020 4.35       Creatinine  Annually CREATININE (mg/dL)   Date Value   05/11/2014 0.57     Creatinine (mg/dL)   Date Value   02/29/2024 1.09 (H)   11/12/2020 0.80       Digoxin Serum Conc  Annually No results found for: \"DIGOXIN\"          ALLERGIES:     Allergies   Allergen Reactions    Adhesive Tape RASH and ITCHING    Grass ITCHING and OTHER (SEE COMMENTS)     Headaches with blood shot eyes    Latex ITCHING    Other      Patient experiences  a yeast infection with any antibiotic administration please treat for yeast infection whenever starting  An antibiotic     MEDICAL INFORMATION:   Past Medical History:    Allergic rhinitis    Anxiety    Arrhythmia    A-fib    Arthritis    Back problem    Cancer (HCC)    skin cancer removed  from leg    Cellulitis    Rt. leg ankle    Cellulitis    Depression    Esophageal reflux    Essential hypertension, benign    Hip arthrosis    Hyperthyroidism    Migraines    Obesity    Osteoarthrosis, unspecified whether generalized or localized, unspecified site    PONV (postoperative nausea and vomiting)    Unspecified disorder of thyroid    Hashimoto's     Unspecified sleep apnea    Visual impairment      Past Surgical History:   Procedure Laterality Date    Adenoidectomy      Cataract Bilateral 2021    Foot/toes surgery proc unlisted  2005    bunion, right foot hammertoe    Hernia surgery      repair of umbilical hernia    Hip replacement surgery   &     Left , right     Hysterectomy  2000          Other surgical history  2010    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)    Other surgical history Right 2016    Procedure: MUSCLE BIOPSY ORTHOPEDIC;  Surgeon: Jovani Powell MD;  Location:  [facility]    Other surgical history Bilateral 2016    Procedure: SACROILIAC JOINT INJECTION BILATERAL;  Surgeon: Lj Vela MD;  Location:  [facility]    Skin surgery  2021    facial     Tonsillectomy  1965    Total hip replacement Left     Tubal ligation      Upper arm/elbow surgery unlisted  1996    left      Family History   Problem Relation Age of Onset    Hypertension Mother     Asthma Mother     Hypertension Father     Cancer Sister      Immunization History      Immunization History  Administered            Date(s) Administered    Covid-19 Vaccine Pfizer 30 mcg/0.3 ml                          2021        SOCIAL HISTORY:   Social History      Socioeconomic History    Marital status:    Tobacco Use    Smoking status: Every Day     Current packs/day: 0.50     Average packs/day: 0.5 packs/day for 32.0 years (16.0 ttl pk-yrs)     Types: Cigarettes    Smokeless tobacco: Never    Tobacco comments:     1/2 pack daily   Vaping Use    Vaping status: Never Used   Substance and Sexual Activity    Alcohol use: No     Comment: rare    Drug use: No   Other Topics Concern    Caffeine Concern Yes    Exercise No    Seat Belt Yes        REVIEW OF SYSTEMS:   GENERAL: feels well otherwise  SKIN: denies any unusual other skin lesions  EYES: denies blurred vision or double vision  HEENT: denies nasal congestion, sinus pain or ST  LUNGS: denies shortness of breath with exertion  CARDIOVASCULAR: denies chest pain on exertion  GI: denies abdominal pain, denies heartburn  : denies dysuria, vaginal discharge or itching  MUSCULOSKELETAL: denies back pain  NEURO: denies headaches  PSYCHE: denies depression or anxiety  HEMATOLOGIC: denies hx of anemia  ENDOCRINE: denies thyroid history  ALL/ASTHMA: denies hx of allergy or asthma    EXAM:   /74   Pulse 65   Resp 18   Ht 5' 7\" (1.702 m)   Wt 255 lb (115.7 kg)   LMP 01/20/2001 (Approximate)   SpO2 99%   BMI 39.94 kg/m²    >   BP Readings from Last 3 Encounters:   05/01/24 110/74   02/21/24 138/76   01/03/24 124/80     GENERAL: well developed, well nourished, in no apparent distress, obese  SKIN: no rashes, no suspicious lesions except 2cm oval ozzing sore in the right upper inner thigh.  HEENT: atraumatic, normocephalic, ears and throat are clear                Hearing Assessed via: Whispered Voice  EYES: PERRLA, EOMI, conjunctiva are clear normal optic disc  NECK: supple, no adenopathy, no bruits  CHEST: no chest tenderness  BREAST:deferred   LUNGS: clear to auscultation  CARDIO: RRR without murmur  GI: good BS's, no masses, HSM or tenderness  : deferred  RECTAL: deferred  L knee: postsurgical wound: healing  well.  EXTREMITIES: no cyanosis, clubbing, but chronic edema present, left worse than right, Homans neg bilaterally, erythema and increased calor.  NEURO: Oriented times three, cranial nerves are intact, motor and sensory are grossly intact      ASSESSMENT AND PLAN OF RELEVANT CHRONIC CONDITIONS:   Yumi De Los Santos is a 68 year old female who presents for a Medicare Assessment.     Cellulitis of let lower extremities, localized swelling of lower leg, legs edema:  Requested Prescriptions     Signed Prescriptions Disp Refills    levoFLOXacin (LEVAQUIN) 500 MG Oral Tab 7 tablet 0     Sig: Take 1 tablet (500 mg total) by mouth daily.    predniSONE 20 MG Oral Tab 10 tablet 0     Sig: Two tablets po qd for  5 days.   No more NSAIDs and full dose of ASA, just ASA 81mg.  D dimer, CBC today.    Skin sore: dressing place, Rx Levaquin as above.      S/p left knee replacement: doing well, swelling managed as above.    Encounter for screening mammogram for breast cancer   -     [facility] JORGE 2D+3D SCREENING BILAT (CPT=77067/04897); Future      Osteopenia of multiple sites  -    stable.  Proper diet d/w the pt.      Pure hypercholesterolemia  -     pravastatin 20 MG Oral Tab; Take 1 tablet (20 mg total) by mouth every evening.   low sugar, low salt and  lipid diet, exercise 3 times a week d/w the pt.Doing well.    Essential hypertension benign.Edema of both legs:  stable  81mg of ASA, low cholesterol and low salt diet and regular exercise encouraged.  All side effects vs benefits d/w the pt.  Cont. Same meds.    Acquired hypothyroidism: stable.Cont. Synthroid.    Obstructive sleep apnea (adult) (pediatric): doing well, on Cpap    Seasonal allergic rhinitis due to pollen: well controlled.    History of pituitary adenoma: no sign of the disease.Sees specialist once a year.    Morbid obesity:   low sugar, low salt and  lipid diet, exercise 3 times a week d/w the pt.Losing weight, calories counting.      Smoking: smoking less, education  given.           The patient indicates understanding of these issues and agrees to the plan.  The patient is asked to return in 6 months for office visit  Diet counseling perfomed    SUGGESTED VACCINATIONS - Influenza, Pneumococcal, Zoster, Tetanus   Influenza: No recommendations at this time  Pneumonia: Pneumococcal Vaccination(1 of 2 - PCV) Never done  Shingrix shingles vaccine is due

## 2024-05-03 NOTE — TELEPHONE ENCOUNTER
----- Message from Maritza Wilson MD sent at 5/2/2024  7:57 PM CDT -----  No blood clots, pt probably has small elevation of D dimer from swelling, cont. Predniosne, antibiotics.

## 2024-05-08 ENCOUNTER — OFFICE VISIT (OUTPATIENT)
Dept: FAMILY MEDICINE CLINIC | Facility: CLINIC | Age: 69
End: 2024-05-08
Payer: MEDICARE

## 2024-05-08 VITALS
DIASTOLIC BLOOD PRESSURE: 78 MMHG | BODY MASS INDEX: 39 KG/M2 | WEIGHT: 249 LBS | SYSTOLIC BLOOD PRESSURE: 120 MMHG | HEART RATE: 65 BPM

## 2024-05-08 DIAGNOSIS — I89.0 LYMPHEDEMA: Primary | ICD-10-CM

## 2024-05-08 PROCEDURE — 99214 OFFICE O/P EST MOD 30 MIN: CPT | Performed by: FAMILY MEDICINE

## 2024-05-08 NOTE — PROGRESS NOTES
CHIEF COMPLAIN: lymphedema    HPI:       Pt has had left knee replacement, healing well, 6 weeks, doing PT, now for few days her edema of lower legs are getting worse, left lower leg worse, redness, irritation, swelling, heaviness.not better with OCT meds.Negative for DVT, normal CBC, pt finished steroids and antibiotics.    Leg sore: healed with ointments and antibiotics.    Current Outpatient Medications   Medication Sig Dispense Refill    semaglutide 2 MG/3ML Subcutaneous Solution Pen-injector Inject 0.25 mg into the skin once a week.      Acetaminophen ER (TYLENOL 8 HOUR) 650 MG Oral Tab CR Take 1 tablet (650 mg total) by mouth 2 (two) times daily.      amoxicillin 500 MG Oral Cap Take 4 capsules (2,000 mg total) by mouth See Admin Instructions. Prior to dental appointments      celecoxib 200 MG Oral Cap Take 1 capsule (200 mg total) by mouth every morning. 30 capsule 0    pravastatin 20 MG Oral Tab Take 1 tablet (20 mg total) by mouth every evening. 90 tablet 0    Esomeprazole Magnesium (NEXIUM) 20 MG Oral Capsule Delayed Release Take 1 capsule (20 mg total) by mouth every morning before breakfast.      Aspirin-Caffeine (RONY BACK & BODY) 500-32.5 MG Oral Tab Take 1 tablet by mouth every morning.      Melatonin 10 MG Oral Chew Tab Chew 20 mg by mouth at bedtime.      Potassium Chloride ER 10 MEQ Oral Tab CR Take 1 tablet (10 mEq total) by mouth 2 (two) times daily. 180 tablet 0    BUMETANIDE 2 MG Oral Tab TAKE TWO TABLETS BY MOUTH EVERY MORNING 180 tablet 0    METOPROLOL SUCCINATE ER 50 MG Oral Tablet 24 Hr TAKE ONE TABLET BY MOUTH ONCE DAILY 90 tablet 0    levothyroxine 137 MCG Oral Tab Take 137 mcg by mouth daily.      HYDROcodone-acetaminophen  MG Oral Tab 1 tab po q4 hr prn-30 days 180 tablet 0    Vitamin D3 (VITAMIN D3) 2000 UNITS Oral Cap Take 1 capsule (2,000 Units total) by mouth daily.        Past Medical History:    Allergic rhinitis    Anxiety    Arrhythmia    A-fib    Arthritis    Back  problem    Cancer (HCC)    skin cancer removed  from leg    Cellulitis    Rt. leg ankle    Cellulitis    Depression    Esophageal reflux    Essential hypertension, benign    Hip arthrosis    Hyperthyroidism    Migraines    Obesity    Osteoarthrosis, unspecified whether generalized or localized, unspecified site    PONV (postoperative nausea and vomiting)    Unspecified disorder of thyroid    Hashimoto's     Unspecified sleep apnea    Visual impairment      Past Surgical History:   Procedure Laterality Date    Adenoidectomy      Cataract Bilateral 2021    Foot/toes surgery proc unlisted  2005    bunion, right foot hammertoe    Hernia surgery  2012    repair of umbilical hernia    Hip replacement surgery   &     Left , right     Hysterectomy  2000          Other surgical history  2010    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)    Other surgical history Right 2016    Procedure: MUSCLE BIOPSY ORTHOPEDIC;  Surgeon: Jovani Powell MD;  Location:  [facility]    Other surgical history Bilateral 2016    Procedure: SACROILIAC JOINT INJECTION BILATERAL;  Surgeon: Lj Vela MD;  Location:  [facility]    Skin surgery  2021    facial     Tonsillectomy  1965    Total hip replacement Left     Tubal ligation      Upper arm/elbow surgery unlisted  1996    left      Family History   Problem Relation Age of Onset    Hypertension Mother     Asthma Mother     Hypertension Father     Cancer Sister       Social History     Socioeconomic History    Marital status:    Tobacco Use    Smoking status: Every Day     Current packs/day: 0.50     Average packs/day: 0.5 packs/day for 32.0 years (16.0 ttl pk-yrs)     Types: Cigarettes    Smokeless tobacco: Never    Tobacco comments:     1/2 pack daily   Vaping Use    Vaping status: Never Used   Substance and Sexual Activity    Alcohol use: No     Comment: rare    Drug use: No   Other Topics Concern    Caffeine Concern  Yes    Exercise No    Seat Belt Yes         REVIEW OF SYSTEMS:   GENERAL: feels well otherwise, no fever, no chills.  SKIN: No redness, No ulcerations.  EYES:denies blurred vision or double vision  HEENT: no rhinorrhea. No edema of the lips or swelling of throat.  CHEST: no chest pains, no palpitations.  LUNGS: denies shortness of breath with exertion or rest.No wheezing, no cough.  LYMPH: no enlargement of the lymph nodes.  MUSC/SKEL: no joint swelling,no no joint stiffness.  CARDIOVASCULAR: denies chest pain on exertion or rest.  GI: no nausea, no vomiting or abdominal pain  NEURO: no abnormal sensation, no tingling of the skin or numbness.      EXAM:   /78   Pulse 65   Wt 249 lb (112.9 kg)   LMP 01/20/2001 (Approximate)   BMI 39.00 kg/m²   GENERAL: well developed, well nourished,in no apparent distress  SKIN: erythema  EYES:PERRLA, EOMI, normal optic disk,conjunctiva are clear  HEENT: head atraumatic, normocephalic,TM wnl josias,no erythema of the throat.Moist oral and throat mucosa.  NOSE- normal external nose. Mucosa normal.  NECK: supple,no adenopathy  LUNGS: clear to auscultation  CARDIO: RRR without murmur  GI: good BS's,no masses, HSM or tenderness  LEGS josias: 2+ pitting edema with erythema.    ASSESSMENT AND PLAN:   Yumi De Los Santos is a 68 year old female who presents with:  Yumi was seen today for follow - up.    Diagnoses and all orders for this visit:    Lymphedema      Referral for lymphedema PT.  The patient indicates understanding of these issues and agrees to the plan.  The patient is asked to return in 7 days if sx's persist or worsen.

## 2024-05-17 DIAGNOSIS — R60.0 EDEMA OF BOTH LEGS: ICD-10-CM

## 2024-05-17 RX ORDER — POTASSIUM CHLORIDE 750 MG/1
10 TABLET, FILM COATED, EXTENDED RELEASE ORAL 2 TIMES DAILY
Qty: 180 TABLET | Refills: 0 | Status: SHIPPED | OUTPATIENT
Start: 2024-05-17

## 2024-05-17 RX ORDER — METOPROLOL SUCCINATE 50 MG/1
50 TABLET, EXTENDED RELEASE ORAL DAILY
Qty: 90 TABLET | Refills: 0 | Status: SHIPPED | OUTPATIENT
Start: 2024-05-17

## 2024-05-17 NOTE — TELEPHONE ENCOUNTER
Medication(s) to Refill:   Requested Prescriptions     Pending Prescriptions Disp Refills    METOPROLOL SUCCINATE ER 50 MG Oral Tablet 24 Hr [Pharmacy Med Name: Metoprolol Succinate Er 24hr 50 Mg Tab Nort] 90 tablet 0     Sig: TAKE ONE TABLET BY MOUTH ONCE DAILY    POTASSIUM CHLORIDE ER 10 MEQ Oral Tab CR [Pharmacy Med Name: Potassium Chloride Er 10 Meq Tab Twip] 180 tablet 0     Sig: TAKE ONE TABLET BY MOUTH TWICE DAILY         Reason for Medication Refill being sent to Provider / Reason Protocol Failed:  [] 90 day refill has already been granted  [] Blood Pressure out of range  [] Labs Abnormal/over due  [] Medication not previously prescribed by Provider  [] Non-Protocol Medication  [] Controlled Substance   [] Due for appointment- no future appointment scheduled  [] No Follow up specified      Last Time Medication was Filled:  2/13/24      Last Office Visit with PCP: 5/8/24    When Patient was Due Back to the Office:    (from when PCP last addressed condition)    Future Appointments:  No future appointments.      Last Blood Pressures:  BP Readings from Last 2 Encounters:   05/08/24 120/78   05/01/24 110/74         Action taken:  [] Refill approved per protocol  [] Routing to provider for approval

## 2024-05-28 RX ORDER — BUMETANIDE 2 MG/1
4 TABLET ORAL EVERY MORNING
Qty: 180 TABLET | Refills: 0 | Status: SHIPPED | OUTPATIENT
Start: 2024-05-28

## 2024-05-28 RX ORDER — CELECOXIB 200 MG/1
200 CAPSULE ORAL EVERY MORNING
Qty: 30 CAPSULE | Refills: 0 | Status: SHIPPED | OUTPATIENT
Start: 2024-05-28

## 2024-06-24 DIAGNOSIS — E78.00 PURE HYPERCHOLESTEROLEMIA: ICD-10-CM

## 2024-06-25 RX ORDER — PRAVASTATIN SODIUM 20 MG
20 TABLET ORAL EVERY EVENING
Qty: 90 TABLET | Refills: 1 | Status: SHIPPED | OUTPATIENT
Start: 2024-06-25

## 2024-06-25 RX ORDER — CELECOXIB 200 MG/1
200 CAPSULE ORAL EVERY MORNING
Qty: 90 CAPSULE | Refills: 1 | Status: SHIPPED | OUTPATIENT
Start: 2024-06-25

## 2024-08-12 DIAGNOSIS — R60.0 EDEMA OF BOTH LEGS: ICD-10-CM

## 2024-08-12 DIAGNOSIS — I10 ESSENTIAL HYPERTENSION, BENIGN: Primary | ICD-10-CM

## 2024-08-13 RX ORDER — POTASSIUM CHLORIDE 750 MG/1
10 TABLET, FILM COATED, EXTENDED RELEASE ORAL 2 TIMES DAILY
Qty: 180 TABLET | Refills: 0 | Status: SHIPPED | OUTPATIENT
Start: 2024-08-13

## 2024-08-13 RX ORDER — METOPROLOL SUCCINATE 50 MG/1
50 TABLET, EXTENDED RELEASE ORAL DAILY
Qty: 90 TABLET | Refills: 0 | Status: SHIPPED | OUTPATIENT
Start: 2024-08-13

## 2024-11-11 DIAGNOSIS — I10 ESSENTIAL HYPERTENSION, BENIGN: ICD-10-CM

## 2024-11-12 RX ORDER — METOPROLOL SUCCINATE 50 MG/1
50 TABLET, EXTENDED RELEASE ORAL DAILY
Qty: 90 TABLET | Refills: 0 | Status: SHIPPED | OUTPATIENT
Start: 2024-11-12

## 2024-11-13 DIAGNOSIS — R60.0 EDEMA OF BOTH LEGS: ICD-10-CM

## 2024-11-13 RX ORDER — POTASSIUM CHLORIDE 750 MG/1
10 TABLET, EXTENDED RELEASE ORAL 2 TIMES DAILY
Qty: 180 TABLET | Refills: 0 | Status: SHIPPED | OUTPATIENT
Start: 2024-11-13

## 2024-11-13 NOTE — TELEPHONE ENCOUNTER
Medication(s) to Refill:   Requested Prescriptions     Pending Prescriptions Disp Refills    POTASSIUM CHLORIDE ER 10 MEQ Oral Tab CR [Pharmacy Med Name: Potassium Chloride Er 10 Meq Tab Upsh] 180 tablet 0     Sig: TAKE ONE TABLET BY MOUTH TWICE DAILY         Reason for Medication Refill being sent to Provider / Reason Protocol Failed:  [] 90 day refill has already been granted  [] Blood Pressure out of range  [] Labs Abnormal/over due  [] Medication not previously prescribed by Provider  [] Non-Protocol Medication  [] Controlled Substance   [] Due for appointment- no future appointment scheduled  [] No Follow up specified      Last Time Medication was Filled:  8/13/24      Last Office Visit with PCP: 5/8/24    When Patient was Due Back to the Office:    (from when PCP last addressed condition)    Future Appointments:  No future appointments.      Last Blood Pressures:  BP Readings from Last 2 Encounters:   05/08/24 120/78   05/01/24 110/74         Action taken:  [] Refill approved per protocol  [] Routing to provider for approval

## 2024-12-17 DIAGNOSIS — E78.00 PURE HYPERCHOLESTEROLEMIA: ICD-10-CM

## 2024-12-17 RX ORDER — PRAVASTATIN SODIUM 20 MG
20 TABLET ORAL EVERY EVENING
Qty: 90 TABLET | Refills: 0 | Status: SHIPPED | OUTPATIENT
Start: 2024-12-17

## 2024-12-17 RX ORDER — CELECOXIB 200 MG/1
200 CAPSULE ORAL EVERY MORNING
Qty: 90 CAPSULE | Refills: 0 | Status: SHIPPED | OUTPATIENT
Start: 2024-12-17

## 2024-12-24 RX ORDER — BUMETANIDE 2 MG/1
4 TABLET ORAL EVERY MORNING
Qty: 180 TABLET | Refills: 0 | Status: SHIPPED | OUTPATIENT
Start: 2024-12-24

## 2024-12-27 ENCOUNTER — NURSE TRIAGE (OUTPATIENT)
Dept: FAMILY MEDICINE CLINIC | Facility: CLINIC | Age: 69
End: 2024-12-27

## 2024-12-27 NOTE — TELEPHONE ENCOUNTER
Action Requested: Summary for Provider     []  Critical Lab, Recommendations Needed  [] Need Additional Advice  [x]   FYI    []   Need Orders  [] Need Medications Sent to Pharmacy  []  Other     SUMMARY: Pt tested positive for COVID on  test (2023).  Advised pt to re-test with new up to date home test and discussed home interventions for mild symptoms.  Will provide office with condition update on Monday.    Reason for call: Covid  Onset: Today    Notes:  - Pt tested positive for COVID but test  2023.  - Symptoms mild: congestion, PND, dry cough, sore throat.  - Denies: fever, SOB, wheezing, hemoptysis, loss of taste/smell.  - Discussed testing with non- test, and home care interventions for symptom management of mild symptoms.  - Advised VV needed for Paxlovid rx, pt comfortable postponing Paxlovid to see how home interventions help her symptoms.  - Pt will call office on Monday for condition update.    Reason for Disposition  • COVID-19 diagnosed by positive lab test (e.g., PCR, rapid self-test kit) and mild symptoms (e.g., cough, fever, others) and no complications or SOB    Protocols used: Coronavirus (COVID-19) Diagnosed or [id number]

## 2025-01-16 ENCOUNTER — TELEPHONE (OUTPATIENT)
Dept: FAMILY MEDICINE CLINIC | Facility: CLINIC | Age: 70
End: 2025-01-16

## 2025-01-16 NOTE — TELEPHONE ENCOUNTER
Dr. Wilson- please see below note, insurance was on the phone and could not provide any additonal covered alternatives. Recommend [facility]?

## 2025-01-16 NOTE — TELEPHONE ENCOUNTER
Triage call transferred.   Spoke with pt, on the line with [employer] insurance, seen in OV 1/15/25, f/u on zepbound as this is not on pt's formulary list as a covered med.   [employer] not able to provide any additional covered alternatives.   Informed will relay to PCP for further alternative recommendation. Confirmed [facility] on file.  Pt verbalized understanding and agreed with POC.    Please advise. Thank you.

## 2025-01-17 NOTE — TELEPHONE ENCOUNTER
Then pt should see a doctor who does generic shots etc for good martinez.  Dr Villanueva does that.        Dr. Yair Villanueva  Board certified obesity physician.  [address], [address]  Phone: [phone]

## 2025-01-19 ENCOUNTER — TELEPHONE (OUTPATIENT)
Age: 70
End: 2025-01-19

## 2025-01-21 ENCOUNTER — LAB ENCOUNTER (OUTPATIENT)
Dept: LAB | Age: 70
End: 2025-01-21
Attending: FAMILY MEDICINE
Payer: MEDICARE

## 2025-01-21 DIAGNOSIS — I10 ESSENTIAL HYPERTENSION, BENIGN: ICD-10-CM

## 2025-01-21 LAB
ALBUMIN SERPL-MCNC: 4.4 G/DL (ref 3.2–4.8)
ALBUMIN/GLOB SERPL: 1.7 {RATIO} (ref 1–2)
ALP LIVER SERPL-CCNC: 84 U/L
ALT SERPL-CCNC: 12 U/L
ANION GAP SERPL CALC-SCNC: 9 MMOL/L (ref 0–18)
AST SERPL-CCNC: 18 U/L (ref ?–34)
BASOPHILS # BLD AUTO: 0.05 X10(3) UL (ref 0–0.2)
BASOPHILS NFR BLD AUTO: 0.8 %
BILIRUB SERPL-MCNC: 0.7 MG/DL (ref 0.2–1.1)
BUN BLD-MCNC: 22 MG/DL (ref 9–23)
CALCIUM BLD-MCNC: 9.9 MG/DL (ref 8.7–10.6)
CHLORIDE SERPL-SCNC: 103 MMOL/L (ref 98–112)
CHOLEST SERPL-MCNC: 182 MG/DL (ref ?–200)
CO2 SERPL-SCNC: 30 MMOL/L (ref 21–32)
CREAT BLD-MCNC: 0.92 MG/DL
EGFRCR SERPLBLD CKD-EPI 2021: 68 ML/MIN/1.73M2 (ref 60–?)
EOSINOPHIL # BLD AUTO: 0.28 X10(3) UL (ref 0–0.7)
EOSINOPHIL NFR BLD AUTO: 4.4 %
ERYTHROCYTE [DISTWIDTH] IN BLOOD BY AUTOMATED COUNT: 14.1 %
FASTING PATIENT LIPID ANSWER: YES
FASTING STATUS PATIENT QL REPORTED: YES
GLOBULIN PLAS-MCNC: 2.6 G/DL (ref 2–3.5)
GLUCOSE BLD-MCNC: 84 MG/DL (ref 70–99)
HCT VFR BLD AUTO: 39.3 %
HDLC SERPL-MCNC: 46 MG/DL (ref 40–59)
HGB BLD-MCNC: 12.4 G/DL
IMM GRANULOCYTES # BLD AUTO: 0.02 X10(3) UL (ref 0–1)
IMM GRANULOCYTES NFR BLD: 0.3 %
LDLC SERPL CALC-MCNC: 107 MG/DL (ref ?–100)
LYMPHOCYTES # BLD AUTO: 1.77 X10(3) UL (ref 1–4)
LYMPHOCYTES NFR BLD AUTO: 27.9 %
MCH RBC QN AUTO: 30.4 PG (ref 26–34)
MCHC RBC AUTO-ENTMCNC: 31.6 G/DL (ref 31–37)
MCV RBC AUTO: 96.3 FL
MONOCYTES # BLD AUTO: 0.57 X10(3) UL (ref 0.1–1)
MONOCYTES NFR BLD AUTO: 9 %
NEUTROPHILS # BLD AUTO: 3.66 X10 (3) UL (ref 1.5–7.7)
NEUTROPHILS # BLD AUTO: 3.66 X10(3) UL (ref 1.5–7.7)
NEUTROPHILS NFR BLD AUTO: 57.6 %
NONHDLC SERPL-MCNC: 136 MG/DL (ref ?–130)
OSMOLALITY SERPL CALC.SUM OF ELEC: 297 MOSM/KG (ref 275–295)
PLATELET # BLD AUTO: 278 10(3)UL (ref 150–450)
POTASSIUM SERPL-SCNC: 4.7 MMOL/L (ref 3.5–5.1)
PROT SERPL-MCNC: 7 G/DL (ref 5.7–8.2)
RBC # BLD AUTO: 4.08 X10(6)UL
SODIUM SERPL-SCNC: 142 MMOL/L (ref 136–145)
T4 FREE SERPL-MCNC: 1.6 NG/DL (ref 0.8–1.7)
TRIGL SERPL-MCNC: 165 MG/DL (ref 30–149)
TSI SER-ACNC: 2.67 UIU/ML (ref 0.55–4.78)
VLDLC SERPL CALC-MCNC: 28 MG/DL (ref 0–30)
WBC # BLD AUTO: 6.4 X10(3) UL (ref 4–11)

## 2025-01-21 PROCEDURE — 80061 LIPID PANEL: CPT

## 2025-01-21 PROCEDURE — 80053 COMPREHEN METABOLIC PANEL: CPT

## 2025-01-21 PROCEDURE — 85025 COMPLETE CBC W/AUTO DIFF WBC: CPT

## 2025-01-21 PROCEDURE — 84439 ASSAY OF FREE THYROXINE: CPT

## 2025-01-21 PROCEDURE — 84443 ASSAY THYROID STIM HORMONE: CPT

## 2025-01-21 PROCEDURE — 36415 COLL VENOUS BLD VENIPUNCTURE: CPT

## 2025-02-08 DIAGNOSIS — R60.0 EDEMA OF BOTH LEGS: ICD-10-CM

## 2025-02-08 DIAGNOSIS — I10 ESSENTIAL HYPERTENSION, BENIGN: ICD-10-CM

## 2025-02-11 RX ORDER — METOPROLOL SUCCINATE 50 MG/1
50 TABLET, EXTENDED RELEASE ORAL DAILY
Qty: 90 TABLET | Refills: 0 | Status: SHIPPED | OUTPATIENT
Start: 2025-02-11

## 2025-02-11 RX ORDER — POTASSIUM CHLORIDE 750 MG/1
10 TABLET, EXTENDED RELEASE ORAL 2 TIMES DAILY
Qty: 180 TABLET | Refills: 0 | Status: SHIPPED | OUTPATIENT
Start: 2025-02-11

## 2025-02-18 ENCOUNTER — PATIENT MESSAGE (OUTPATIENT)
Dept: FAMILY MEDICINE CLINIC | Facility: CLINIC | Age: 70
End: 2025-02-18

## 2025-02-18 NOTE — TELEPHONE ENCOUNTER
Patient requesting you review MRI results  MRI pituitary completed 2/17  Results in care everywhere

## 2025-02-19 RX ORDER — DOXYCYCLINE 100 MG/1
100 CAPSULE ORAL 2 TIMES DAILY
Qty: 14 CAPSULE | Refills: 0 | Status: SHIPPED | OUTPATIENT
Start: 2025-02-19

## 2025-02-19 NOTE — TELEPHONE ENCOUNTER
Meningioma is bigger, I recommend to see our neurosurgery group, yesterday I gave pt the info.  Also pt has bad sinusitis, ok to send: Doxycycline 100mg BID for one week.

## 2025-03-13 DIAGNOSIS — E78.00 PURE HYPERCHOLESTEROLEMIA: ICD-10-CM

## 2025-03-13 RX ORDER — PRAVASTATIN SODIUM 20 MG
20 TABLET ORAL EVERY EVENING
Qty: 90 TABLET | Refills: 0 | Status: SHIPPED | OUTPATIENT
Start: 2025-03-13

## 2025-03-13 RX ORDER — CELECOXIB 200 MG/1
200 CAPSULE ORAL EVERY MORNING
Qty: 90 CAPSULE | Refills: 0 | Status: SHIPPED | OUTPATIENT
Start: 2025-03-13

## 2025-04-10 RX ORDER — BUMETANIDE 2 MG/1
4 TABLET ORAL EVERY MORNING
Qty: 180 TABLET | Refills: 3 | Status: SHIPPED | OUTPATIENT
Start: 2025-04-10

## 2025-04-10 NOTE — TELEPHONE ENCOUNTER
Refill passes per [facility] protocol.    Future Appointments   Date Time Provider Department Center   6/18/2025  9:00 AM Maritza Wilson MD [facility] [place]

## 2025-05-06 DIAGNOSIS — R60.0 EDEMA OF BOTH LEGS: ICD-10-CM

## 2025-05-06 RX ORDER — POTASSIUM CHLORIDE 750 MG/1
10 TABLET, EXTENDED RELEASE ORAL 2 TIMES DAILY
Qty: 180 TABLET | Refills: 0 | Status: SHIPPED | OUTPATIENT
Start: 2025-05-06

## 2025-05-09 DIAGNOSIS — I10 ESSENTIAL HYPERTENSION, BENIGN: ICD-10-CM

## 2025-05-09 RX ORDER — METOPROLOL SUCCINATE 50 MG/1
50 TABLET, EXTENDED RELEASE ORAL DAILY
Qty: 90 TABLET | Refills: 3 | Status: SHIPPED | OUTPATIENT
Start: 2025-05-09

## 2025-06-06 DIAGNOSIS — E78.00 PURE HYPERCHOLESTEROLEMIA: ICD-10-CM

## 2025-06-09 RX ORDER — CELECOXIB 200 MG/1
200 CAPSULE ORAL EVERY MORNING
Qty: 90 CAPSULE | Refills: 1 | Status: SHIPPED | OUTPATIENT
Start: 2025-06-09

## 2025-06-09 RX ORDER — PRAVASTATIN SODIUM 20 MG
20 TABLET ORAL EVERY EVENING
Qty: 90 TABLET | Refills: 1 | Status: SHIPPED | OUTPATIENT
Start: 2025-06-09

## 2025-06-18 ENCOUNTER — OFFICE VISIT (OUTPATIENT)
Dept: FAMILY MEDICINE CLINIC | Facility: CLINIC | Age: 70
End: 2025-06-18
Payer: MEDICARE

## 2025-06-18 ENCOUNTER — LAB ENCOUNTER (OUTPATIENT)
Dept: LAB | Age: 70
End: 2025-06-18
Attending: FAMILY MEDICINE
Payer: MEDICARE

## 2025-06-18 VITALS
WEIGHT: 255 LBS | HEIGHT: 67 IN | OXYGEN SATURATION: 98 % | HEART RATE: 60 BPM | BODY MASS INDEX: 40.02 KG/M2 | SYSTOLIC BLOOD PRESSURE: 128 MMHG | DIASTOLIC BLOOD PRESSURE: 70 MMHG

## 2025-06-18 DIAGNOSIS — E78.00 PURE HYPERCHOLESTEROLEMIA: ICD-10-CM

## 2025-06-18 DIAGNOSIS — D35.2 ADENOMA OF PITUITARY (HCC): ICD-10-CM

## 2025-06-18 DIAGNOSIS — F17.200 SMOKING ADDICTION: ICD-10-CM

## 2025-06-18 DIAGNOSIS — Z86.018 HISTORY OF PITUITARY ADENOMA: ICD-10-CM

## 2025-06-18 DIAGNOSIS — Z00.00 MEDICARE ANNUAL WELLNESS VISIT, SUBSEQUENT: Primary | ICD-10-CM

## 2025-06-18 DIAGNOSIS — G47.33 OBSTRUCTIVE SLEEP APNEA (ADULT) (PEDIATRIC): ICD-10-CM

## 2025-06-18 DIAGNOSIS — F41.9 ANXIETY: ICD-10-CM

## 2025-06-18 DIAGNOSIS — I10 ESSENTIAL HYPERTENSION, BENIGN: ICD-10-CM

## 2025-06-18 DIAGNOSIS — J30.1 SEASONAL ALLERGIC RHINITIS DUE TO POLLEN: ICD-10-CM

## 2025-06-18 DIAGNOSIS — E55.9 VITAMIN D DEFICIENCY: ICD-10-CM

## 2025-06-18 DIAGNOSIS — R60.0 EDEMA OF BOTH LEGS: ICD-10-CM

## 2025-06-18 DIAGNOSIS — E03.9 ACQUIRED HYPOTHYROIDISM: Chronic | ICD-10-CM

## 2025-06-18 DIAGNOSIS — Z96.652 STATUS POST LEFT KNEE REPLACEMENT: ICD-10-CM

## 2025-06-18 DIAGNOSIS — E66.01 SEVERE OBESITY (HCC): ICD-10-CM

## 2025-06-18 DIAGNOSIS — J32.9 CHRONIC CONGESTION OF PARANASAL SINUS: ICD-10-CM

## 2025-06-18 DIAGNOSIS — M85.89 OSTEOPENIA OF MULTIPLE SITES: ICD-10-CM

## 2025-06-18 PROBLEM — Z12.31 ENCOUNTER FOR SCREENING MAMMOGRAM FOR BREAST CANCER: Status: RESOLVED | Noted: 2024-05-02 | Resolved: 2025-06-18

## 2025-06-18 PROBLEM — IMO0001 SMOKING: Status: RESOLVED | Noted: 2021-02-22 | Resolved: 2025-06-18

## 2025-06-18 LAB
ALBUMIN SERPL-MCNC: 5.1 G/DL (ref 3.2–4.8)
ALBUMIN/GLOB SERPL: 2 {RATIO} (ref 1–2)
ALP LIVER SERPL-CCNC: 78 U/L (ref 55–142)
ALT SERPL-CCNC: 14 U/L (ref 10–49)
ANION GAP SERPL CALC-SCNC: 13 MMOL/L (ref 0–18)
AST SERPL-CCNC: 18 U/L (ref ?–34)
BASOPHILS # BLD AUTO: 0.04 X10(3) UL (ref 0–0.2)
BASOPHILS NFR BLD AUTO: 0.6 %
BILIRUB SERPL-MCNC: 0.7 MG/DL (ref 0.2–1.1)
BUN BLD-MCNC: 16 MG/DL (ref 9–23)
CALCIUM BLD-MCNC: 10.1 MG/DL (ref 8.7–10.6)
CHLORIDE SERPL-SCNC: 101 MMOL/L (ref 98–112)
CHOLEST SERPL-MCNC: 151 MG/DL (ref ?–200)
CO2 SERPL-SCNC: 29 MMOL/L (ref 21–32)
CREAT BLD-MCNC: 0.94 MG/DL (ref 0.55–1.02)
EGFRCR SERPLBLD CKD-EPI 2021: 66 ML/MIN/1.73M2 (ref 60–?)
EOSINOPHIL # BLD AUTO: 0.11 X10(3) UL (ref 0–0.7)
EOSINOPHIL NFR BLD AUTO: 1.6 %
ERYTHROCYTE [DISTWIDTH] IN BLOOD BY AUTOMATED COUNT: 14.3 %
FASTING PATIENT LIPID ANSWER: YES
FASTING STATUS PATIENT QL REPORTED: YES
GLOBULIN PLAS-MCNC: 2.6 G/DL (ref 2–3.5)
GLUCOSE BLD-MCNC: 89 MG/DL (ref 70–99)
HCT VFR BLD AUTO: 43 % (ref 35–48)
HDLC SERPL-MCNC: 43 MG/DL (ref 40–59)
HGB BLD-MCNC: 13.9 G/DL (ref 12–16)
IMM GRANULOCYTES # BLD AUTO: 0.02 X10(3) UL (ref 0–1)
IMM GRANULOCYTES NFR BLD: 0.3 %
LDLC SERPL CALC-MCNC: 86 MG/DL (ref ?–100)
LYMPHOCYTES # BLD AUTO: 1.44 X10(3) UL (ref 1–4)
LYMPHOCYTES NFR BLD AUTO: 20.8 %
MCH RBC QN AUTO: 30.2 PG (ref 26–34)
MCHC RBC AUTO-ENTMCNC: 32.3 G/DL (ref 31–37)
MCV RBC AUTO: 93.3 FL (ref 80–100)
MONOCYTES # BLD AUTO: 0.45 X10(3) UL (ref 0.1–1)
MONOCYTES NFR BLD AUTO: 6.5 %
NEUTROPHILS # BLD AUTO: 4.87 X10 (3) UL (ref 1.5–7.7)
NEUTROPHILS # BLD AUTO: 4.87 X10(3) UL (ref 1.5–7.7)
NEUTROPHILS NFR BLD AUTO: 70.2 %
NONHDLC SERPL-MCNC: 108 MG/DL (ref ?–130)
OSMOLALITY SERPL CALC.SUM OF ELEC: 297 MOSM/KG (ref 275–295)
PLATELET # BLD AUTO: 301 10(3)UL (ref 150–450)
POTASSIUM SERPL-SCNC: 4.3 MMOL/L (ref 3.5–5.1)
PROT SERPL-MCNC: 7.7 G/DL (ref 5.7–8.2)
RBC # BLD AUTO: 4.61 X10(6)UL (ref 3.8–5.3)
SODIUM SERPL-SCNC: 143 MMOL/L (ref 136–145)
TRIGL SERPL-MCNC: 125 MG/DL (ref 30–149)
TSI SER-ACNC: 0.08 UIU/ML (ref 0.55–4.78)
VIT D+METAB SERPL-MCNC: 56.4 NG/ML (ref 30–100)
VLDLC SERPL CALC-MCNC: 20 MG/DL (ref 0–30)
WBC # BLD AUTO: 6.9 X10(3) UL (ref 4–11)

## 2025-06-18 PROCEDURE — 80053 COMPREHEN METABOLIC PANEL: CPT

## 2025-06-18 PROCEDURE — G0439 PPPS, SUBSEQ VISIT: HCPCS | Performed by: FAMILY MEDICINE

## 2025-06-18 PROCEDURE — 82306 VITAMIN D 25 HYDROXY: CPT

## 2025-06-18 PROCEDURE — 84443 ASSAY THYROID STIM HORMONE: CPT

## 2025-06-18 PROCEDURE — 80061 LIPID PANEL: CPT

## 2025-06-18 PROCEDURE — 85025 COMPLETE CBC W/AUTO DIFF WBC: CPT

## 2025-06-18 PROCEDURE — 99499 UNLISTED E&M SERVICE: CPT | Performed by: FAMILY MEDICINE

## 2025-06-18 PROCEDURE — 36415 COLL VENOUS BLD VENIPUNCTURE: CPT

## 2025-06-18 PROCEDURE — 99213 OFFICE O/P EST LOW 20 MIN: CPT | Performed by: FAMILY MEDICINE

## 2025-06-18 RX ORDER — SERTRALINE HYDROCHLORIDE 25 MG/1
25 TABLET, FILM COATED ORAL DAILY
COMMUNITY
Start: 2025-04-01

## 2025-06-18 NOTE — PROGRESS NOTES
The following individual(s) verbally consented to be recorded using ambient AI listening technology and understand that they can each withdraw their consent to this listening technology at any point by asking the clinician to turn off or pause the recording:    Patient name: Yumi De Los Santos

## 2025-06-18 NOTE — PROGRESS NOTES
REASON FOR VISIT:    Yumi De Los Santos is a 69 year old female who presents for a Medicare Annual Wellness visit and sinus pressure.    HPI:     Patient Care Team: Patient Care Team:  Maritza Wilson MD as PCP - General (Family Practice)  Yobani Ace MD as Consulting Physician (NEUROSURGERY)  Lombardi, John A, MD (SURGERY, ORTHOPEDIC)  Yobani Murrieta MD (RHEUMATOLOGY)  Kiersten Hernandez MD (ENDOCRINOLOGY)  aMl Conner MD (CARDIOLOGY)  Lorie Workman MD (SURGERY, GENERAL)  Orville Garcia DO (Anesthesiology Pain Medicine)    Patient Active Problem List   Diagnosis    Obstructive sleep apnea (adult) (pediatric)    Essential hypertension, benign    Anxiety    Allergic rhinitis    Hypothyroidism    Edema of both legs    History of pituitary adenoma    Osteopenia of multiple sites    Pure hypercholesterolemia    Status post left knee replacement    Medicare annual wellness visit, subsequent    Vitamin D deficiency    Chronic congestion of paranasal sinus       History of Present Illness  Yumi De Los Santos is a 69 year old female who presents with sinus issues and headaches.    She experiences sinus issues with thick, bright green, sticky mucus draining down the back of her throat approximately four to six times a day. Attempts to clear her sinuses by blowing her nose produce no discharge. These symptoms have persisted for several months. No current infection symptoms in her nose and no fluid in her ears with normal ear canals.    She frequently experiences headaches and a feeling of pressure behind her eyes. She is uncertain if these symptoms are related to her known allergies to grass or her pituitary issue, which requires follow-up with a neurosurgeon. She mentions a past brain scan and the need to follow up regarding a neurogynoma, but this has been delayed due to family obligations.        She experiences significant anxiety triggered by interactions with her brother-in-law, which she describes as causing  her anxiety levels to 'go through the roof'. She has been prescribed sertraline for mood management but has not yet started it pending clearance from her other doctors.          Current Outpatient Medications   Medication Sig Dispense Refill    sertraline 25 MG Oral Tab Take 1 tablet (25 mg total) by mouth daily.      pravastatin 20 MG Oral Tab Take 1 tablet (20 mg total) by mouth every evening. 90 tablet 1    celecoxib 200 MG Oral Cap Take 1 capsule (200 mg total) by mouth every morning. 90 capsule 1    metoprolol succinate ER 50 MG Oral Tablet 24 Hr TAKE ONE TABLET BY MOUTH ONCE DAILY 90 tablet 3    Potassium Chloride ER 10 MEQ Oral Tab CR Take 1 tablet (10 mEq total) by mouth 2 (two) times daily. 180 tablet 0    bumetanide 2 MG Oral Tab Take 2 tablets (4 mg total) by mouth every morning. 180 tablet 3    Pancrelipase, Lip-Prot-Amyl, (ZENPEP) 89140-330662 units Oral Cap DR Particles Take 1 capsule by mouth daily.      semaglutide 2 MG/3ML Subcutaneous Solution Pen-injector Inject 0.25 mg into the skin once a week.      Acetaminophen ER (TYLENOL 8 HOUR) 650 MG Oral Tab CR Take 1 tablet (650 mg total) by mouth 2 (two) times daily.      Esomeprazole Magnesium (NEXIUM) 20 MG Oral Capsule Delayed Release Take 1 capsule (20 mg total) by mouth every morning before breakfast.      Aspirin-Caffeine (RONY BACK & BODY) 500-32.5 MG Oral Tab Take 1 tablet by mouth every morning.      Melatonin 10 MG Oral Chew Tab Chew 20 mg by mouth at bedtime.      levothyroxine 137 MCG Oral Tab Take 137 mcg by mouth daily.      HYDROcodone-acetaminophen  MG Oral Tab 1 tab po q4 hr prn-30 days 180 tablet 0    Vitamin D3 (VITAMIN D3) 2000 UNITS Oral Cap Take 1 capsule (2,000 Units total) by mouth daily.      doxycycline 100 MG Oral Cap Take 1 capsule (100 mg total) by mouth 2 (two) times daily. 14 capsule 0    ZEPBOUND 2.5 MG/0.5ML Subcutaneous Solution Auto-injector Inject 0.5 mL into the skin once a week. 2 mL 1    amoxicillin 500 MG  Oral Cap Take 4 capsules (2,000 mg total) by mouth See Admin Instructions. Prior to dental appointments (Patient not taking: Reported on 6/18/2025)             Latest Ref Rng & Units 1/21/2025     7:48 AM 2/29/2024     9:04 AM 11/28/2023    11:44 AM 5/8/2023     7:12 AM 1/5/2022     9:09 AM 1/9/2021    10:08 AM 11/12/2020     9:11 AM   Glucose and HbA1c   Glucose 70 - 99 mg/dL 84  74  87  91  88  82  104          Latest Ref Rng & Units 1/21/2025     7:48 AM 2/29/2024     9:04 AM 5/8/2023     7:12 AM 1/5/2022     9:09 AM 10/19/2019     8:12 AM 9/1/2012    10:17 AM   Cholesterol   Total Cholesterol <200 mg/dL 182  174  176  153  150  204    Triglycerides 30 - 149 mg/dL 165  120  108  79  70  85    HDL 40 - 59 mg/dL 46  48  49  53  48  46    LDL <100 mg/dL 107  104  107  85  88  141          Latest Ref Rng & Units 1/21/2025     7:48 AM 2/29/2024     9:04 AM 11/28/2023    11:44 AM 5/8/2023     7:12 AM 1/5/2022     9:09 AM 1/9/2021    10:08 AM 11/12/2020     9:11 AM   BUN and Cr   BUN 9 - 23 mg/dL 22  20  23  23  20  24  23.0    Creatinine 0.55 - 1.02 mg/dL 0.92  1.09  0.96  0.86  0.86  0.94  0.80          Latest Ref Rng & Units 1/21/2025     7:48 AM 2/29/2024     9:04 AM 11/28/2023    11:44 AM 5/8/2023     7:12 AM 1/5/2022     9:09 AM 1/9/2021    10:08 AM 9/5/2020     9:59 AM   AST and ALT   AST (SGOT) <34 U/L 18  17  19  15  17  20  14    ALT (SGPT) 10 - 49 U/L 12  18  17  20  23  24  20          Latest Ref Rng & Units 1/21/2025     7:48 AM 2/29/2024     9:04 AM 11/28/2023    11:44 AM 5/8/2023     7:12 AM 1/9/2021    10:08 AM 9/5/2020     9:59 AM 10/19/2019     8:12 AM   TSH and Free T4   TSH 0.550 - 4.780 uIU/mL 2.672  1.400  2.570  1.470  2.950  0.229  0.220    Free T4 0.8 - 1.7 ng/dL 1.6     1.3  1.7          General Health      In the past six months, have you lost more than 10 pounds without trying?: (Patient-Rptd) 2 - No  Has your appetite been poor?: (Patient-Rptd) No  Type of Diet: (Patient-Rptd) Low Carb  How  does the patient maintain a good energy level?: (Patient-Rptd) Appropriate Exercise  How would you describe your daily physical activity?: (Patient-Rptd) Light  How would you describe your current health state?: (Patient-Rptd) Good  How do you maintain positive mental well-being?: (Patient-Rptd) Social Interaction, Puzzles, Visiting Friends, Visiting Family  Have you had any immunizations at another office such as Influenza, Hepatitis B, Tetanus, or Pneumococcal?: (Patient-Rptd) No     Functional Ability     Bathing or Showering: (Patient-Rptd) Able without help  Toileting: (Patient-Rptd) Able without help  Dressing: (Patient-Rptd) Able without help  Eating: (Patient-Rptd) Able without help  Driving: (Patient-Rptd) Able without help  Preparing your meals: (Patient-Rptd) Able without help  Managing money/bills: (Patient-Rptd) Able without help  Taking medications as prescribed: (Patient-Rptd) Able without help  Are you able to afford your medications?: (Patient-Rptd) Yes  Hearing Problems?: (Patient-Rptd) No     Functional Status     Hearing Problems?: (Patient-Rptd) No  Vision Problems? : (Patient-Rptd) No  Difficulty walking?: (Patient-Rptd) Yes  Difficulty dressing or bathing?: (Patient-Rptd) No  Problems with daily activities? : (Patient-Rptd) No  Memory Problems?: (Patient-Rptd) No      Fall/Risk Assessment         flow sheet        Depression Screening (PHQ-2/PHQ-9): Over the LAST 2 WEEKS       Flow sheet     Advance Directives     Do you have a healthcare power of ?: (Patient-Rptd) Yes  Do you have a living will?: (Patient-Rptd) Yes     Cognitive Assessment     What day of the week is this?: Correct  What month is it?: Correct  What year is it?: Correct  Recall \"Ball\": Correct  Recall \"Flag\": Correct  Recall \"Tree\": Correct         PREVENTATIVE SERVICES   INDICATIONS AND SCHEDULE Internal Lab or Procedure   Diabetes Screening     HbgA1C   Annually HGBA1C (%)   Date Value   09/01/2012 5.3     HgbA1C (%)    Date Value   11/28/2023 5.9 (H)       Fasting Blood Sugar (FSB)Annually Glucose (mg/dL)   Date Value   01/21/2025 84   11/12/2020 104     GLUCOSE (mg/dL)   Date Value   05/09/2014 115 (H)      Cardiovascular Disease Screening    LDL Annually LDL Cholesterol (mg/dL)   Date Value   01/21/2025 107 (H)     LDL CHOLESTROL (mg/dL)   Date Value   09/01/2012 141 (H)       EKG - w/ Initial Preventative Physical Exam only, or if medically necessary yes   Colorectal Cancer Screening     Colonoscopy Screen every 10 years Health Maintenance   Topic Date Due    Colorectal Cancer Screening  Never done       Flex Sigmoidoscopy Screen every 5 years No results found for this or any previous visit.    Fecal Occult Blood Annually No results found for: \"FOB\", \"OCCULTSTOOL\"   Glaucoma Screening     Ophthalmology Visit Annually yes   Immunizations     Zoster (Not covered by Medicare Part B) No orders found for this or any previous visit.     SPECIFIC DISEASE MONITORING Internal Lab or Procedure     Annual Monitoring of Persistent Medications  (ACE/ARB, Digoxin, Diuretics)        Potassium  Annually Potassium (mmol/L)   Date Value   01/21/2025 4.7   11/12/2020 4.35       Creatinine  Annually CREATININE (mg/dL)   Date Value   05/11/2014 0.57     Creatinine (mg/dL)   Date Value   01/21/2025 0.92   11/12/2020 0.80       Digoxin Serum Conc  Annually No results found for: \"DIGOXIN\"          ALLERGIES:     Allergies   Allergen Reactions    Adhesive Tape RASH and ITCHING    Grass ITCHING and OTHER (SEE COMMENTS)     Headaches with blood shot eyes    Latex ITCHING    Other      Patient experiences a yeast infection with any antibiotic administration please treat for yeast infection whenever starting  An antibiotic     MEDICAL INFORMATION:   Past Medical History:    Allergic rhinitis    Anxiety    Arrhythmia    A-fib    Arthritis    Back problem    Cancer (HCC)    skin cancer removed  from leg    Cellulitis    Rt. leg ankle    Cellulitis     Depression    Esophageal reflux    Essential hypertension, benign    Hip arthrosis    Hyperthyroidism    Migraines    Obesity    Osteoarthrosis, unspecified whether generalized or localized, unspecified site    PONV (postoperative nausea and vomiting)    Unspecified disorder of thyroid    Hashimoto's     Unspecified sleep apnea    Visual impairment      Past Surgical History:   Procedure Laterality Date    Adenoidectomy      Cataract Bilateral 2021    Foot/toes surgery proc unlisted  2005    bunion, right foot hammertoe    Hernia surgery      repair of umbilical hernia    Hip replacement surgery   &     Left , right     Hysterectomy  2000          Other surgical history  2010    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)    Other surgical history Right 2016    Procedure: MUSCLE BIOPSY ORTHOPEDIC;  Surgeon: Jovani Powell MD;  Location:  MAIN OR    Other surgical history Bilateral 2016    Procedure: SACROILIAC JOINT INJECTION BILATERAL;  Surgeon: Lj Vela MD;  Location:  MAIN OR    Skin surgery  2021    facial     Tonsillectomy  1965    Total hip replacement Left     Tubal ligation      Upper arm/elbow surgery unlisted  1996    left      Family History   Problem Relation Age of Onset    Hypertension Mother     Asthma Mother     Hypertension Father     Cancer Sister      Immunization History      Immunization History  Administered            Date(s) Administered    Covid-19 Vaccine Pfizer 30 mcg/0.3 ml                          2021        SOCIAL HISTORY:   Social History     Socioeconomic History    Marital status:    Tobacco Use    Smoking status: Every Day     Current packs/day: 0.50     Average packs/day: 0.5 packs/day for 32.0 years (16.0 ttl pk-yrs)     Types: Cigarettes    Smokeless tobacco: Never    Tobacco comments:     1/2 pack daily   Vaping Use    Vaping status: Never Used   Substance and Sexual  Activity    Alcohol use: No     Comment: rare    Drug use: No   Other Topics Concern    Caffeine Concern Yes    Exercise No    Seat Belt Yes     Social Drivers of Health     Food Insecurity: No Food Insecurity (6/18/2025)    NCSS - Food Insecurity     Worried About Running Out of Food in the Last Year: No     Ran Out of Food in the Last Year: No   Transportation Needs: No Transportation Needs (6/18/2025)    NCSS - Transportation     Lack of Transportation: No   Housing Stability: Not At Risk (6/18/2025)    NCSS - Housing/Utilities     Has Housing: Yes     Worried About Losing Housing: No     Unable to Get Utilities: No        REVIEW OF SYSTEMS:   GENERAL: feels well otherwise  SKIN: denies any unusual other skin lesions  EYES: denies blurred vision or double vision  HEENT: denies nasal congestion, sinus pain or ST  LUNGS: denies shortness of breath with exertion  CARDIOVASCULAR: denies chest pain on exertion  GI: denies abdominal pain, denies heartburn  : denies dysuria, vaginal discharge or itching  MUSCULOSKELETAL: denies back pain  NEURO: denies headaches  PSYCHE: denies depression or anxiety  HEMATOLOGIC: denies hx of anemia  ENDOCRINE: denies thyroid history  ALL/ASTHMA: denies hx of allergy or asthma    EXAM:   /70   Pulse 60   Ht 5' 7\" (1.702 m)   Wt 255 lb (115.7 kg)   LMP 01/20/2001 (Approximate)   SpO2 98%   BMI 39.94 kg/m²    >   BP Readings from Last 3 Encounters:   06/18/25 128/70   02/18/25 126/70   01/15/25 128/78     GENERAL: well developed, well nourished, in no apparent distress, obese  SKIN: no rashes, no suspicious lesions except 2cm oval ozzing sore in the right upper inner thigh.  HEENT: atraumatic, normocephalic, ears and throat are clear                Hearing Assessed via: Whispered Voice  EYES: PERRLA, EOMI, conjunctiva are clear normal optic disc  NECK: supple, no adenopathy, no bruits  CHEST: no chest tenderness  BREAST:deferred   LUNGS: clear to auscultation  CARDIO: RRR  without murmur  GI: good BS's, no masses, HSM or tenderness  : deferred  RECTAL: deferred  L knee: postsurgical wound: healing well.  EXTREMITIES: no cyanosis, clubbing, but chronic edema present, left worse than right, Homans neg bilaterally, erythema and increased calor.  NEURO: Oriented times three, cranial nerves are intact, motor and sensory are grossly intact      ASSESSMENT AND PLAN OF RELEVANT CHRONIC CONDITIONS:   Yumi De Los Santos is a 69 year old female who presents for a Medicare Assessment.     Assessment & Plan  Chronic congestion of paranasal sinus   Suspected impacted sinuses with thick mucus. ENT evaluation recommended.  - Refer to ENT for further evaluation.  - Recommend guaifenesin to thin mucus.  - Advise sinus rinse with sterile water.    Anxiety:  Experiencing anxiety  Agreed to start sertraline, understanding its effects and non-addictive nature.  - Start sertraline as prescribed.  - Consider half dose during acute anxiety episodes.Sees Dr. Villanueva.    Obesity severe:  Significant weight loss achieved. Under specialist care with a structured diet plan.  - Continue current weight management plan with specialist.    Pituitary adenoma  Known pituitary adenoma. Current sinus symptoms not related.  - Follow up with neurosurgeon.    General Health Maintenance  Fasting and ready for lab work to monitor various health parameters.  - Order lab tests for cholesterol, thyroid, vitamin D levels, and immune system function.          S/p left knee replacement: doing well, swelling much better.      Osteopenia of multiple sites  -    stable.  Proper diet d/w the pt.  Pt refuses Dexa.      Pure hypercholesterolemia  -     pravastatin 20 MG Oral Tab; Take 1 tablet (20 mg total) by mouth every evening.recheck CMP, lipids.   low sugar, low salt and  lipid diet, exercise 3 times a week d/w the pt.Doing well.    Essential hypertension benign.Edema of both legs:  stable  81mg of ASA, low cholesterol and low salt diet  and regular exercise encouraged.  All side effects vs benefits d/w the pt.  Cont. Same meds.    Acquired hypothyroidism: stable.Cont. Synthroid.    Obstructive sleep apnea (adult) (pediatric): doing well, on Cpap    Seasonal allergic rhinitis due to pollen: referral to ENT      Smoking: smoking less, education given.      Pt refuses colonoscopy, mammograms and Dexa.     The patient indicates understanding of these issues and agrees to the plan.  The patient is asked to return in 6 months for office visit  Diet counseling perfomed    SUGGESTED VACCINATIONS - Influenza, Pneumococcal, Zoster, Tetanus   Influenza: No recommendations at this time  Pneumonia: Pneumococcal Vaccination(1 of 2 - PCV) Never done  Shingrix shingles vaccine is due

## 2025-06-18 NOTE — PATIENT INSTRUCTIONS
ENT group:      Dr. JAYE Carr        [facility]  [address] [place] [address],   [place], [place] [id number] [place] [address]  (Opened 6/15/22)   [place]  [address]          [phone].

## 2025-08-02 DIAGNOSIS — R60.0 EDEMA OF BOTH LEGS: ICD-10-CM

## 2025-08-05 RX ORDER — POTASSIUM CHLORIDE 750 MG/1
10 TABLET, EXTENDED RELEASE ORAL 2 TIMES DAILY
Qty: 180 TABLET | Refills: 1 | Status: SHIPPED | OUTPATIENT
Start: 2025-08-05

## 2025-08-15 ENCOUNTER — TELEPHONE (OUTPATIENT)
Dept: SURGERY | Facility: CLINIC | Age: 70
End: 2025-08-15

## 2025-08-15 ENCOUNTER — MED REC SCAN ONLY (OUTPATIENT)
Dept: SURGERY | Facility: CLINIC | Age: 70
End: 2025-08-15

## 2025-08-19 ENCOUNTER — OFFICE VISIT (OUTPATIENT)
Facility: LOCATION | Age: 70
End: 2025-08-19

## 2025-08-19 ENCOUNTER — MED REC SCAN ONLY (OUTPATIENT)
Facility: LOCATION | Age: 70
End: 2025-08-19

## 2025-08-19 DIAGNOSIS — J30.1 SEASONAL ALLERGIC RHINITIS DUE TO POLLEN: ICD-10-CM

## 2025-08-19 DIAGNOSIS — L98.9 LESION OF SKIN OF CHEEK: Primary | ICD-10-CM

## 2025-08-19 PROCEDURE — 99205 OFFICE O/P NEW HI 60 MIN: CPT | Performed by: STUDENT IN AN ORGANIZED HEALTH CARE EDUCATION/TRAINING PROGRAM

## 2025-08-19 PROCEDURE — 11104 PUNCH BX SKIN SINGLE LESION: CPT | Performed by: STUDENT IN AN ORGANIZED HEALTH CARE EDUCATION/TRAINING PROGRAM

## 2025-08-19 PROCEDURE — 88305 TISSUE EXAM BY PATHOLOGIST: CPT | Performed by: STUDENT IN AN ORGANIZED HEALTH CARE EDUCATION/TRAINING PROGRAM

## 2025-08-19 RX ORDER — FLUTICASONE PROPIONATE 50 MCG
1 SPRAY, SUSPENSION (ML) NASAL 2 TIMES DAILY
Qty: 16 G | Refills: 3 | Status: SHIPPED | OUTPATIENT
Start: 2025-08-19

## 2025-08-22 ENCOUNTER — RESULTS FOLLOW-UP (OUTPATIENT)
Facility: LOCATION | Age: 70
End: 2025-08-22

## 2025-08-22 ENCOUNTER — TELEPHONE (OUTPATIENT)
Facility: LOCATION | Age: 70
End: 2025-08-22

## 2025-08-26 ENCOUNTER — OFFICE VISIT (OUTPATIENT)
Dept: NEUROLOGY | Facility: CLINIC | Age: 70
End: 2025-08-26

## 2025-08-26 VITALS
BODY MASS INDEX: 39.7 KG/M2 | OXYGEN SATURATION: 93 % | DIASTOLIC BLOOD PRESSURE: 66 MMHG | SYSTOLIC BLOOD PRESSURE: 128 MMHG | WEIGHT: 247 LBS | HEART RATE: 65 BPM | HEIGHT: 66 IN

## 2025-08-26 DIAGNOSIS — D32.9 MENINGIOMA (HCC): ICD-10-CM

## 2025-08-26 DIAGNOSIS — D35.2 ADENOMA OF PITUITARY (HCC): Primary | ICD-10-CM

## 2025-08-26 PROBLEM — I89.0 LYMPHEDEMA: Status: ACTIVE | Noted: 2023-10-18

## 2025-08-26 PROBLEM — G89.21 CHRONIC PAIN DUE TO INJURY: Status: ACTIVE | Noted: 2025-08-26

## 2025-08-26 PROBLEM — D32.0 INTRACRANIAL MENINGIOMA (HCC): Status: ACTIVE | Noted: 2025-08-26

## 2025-08-26 PROBLEM — U07.1 COVID-19 VIRUS INFECTION: Status: ACTIVE | Noted: 2025-01-02

## 2025-08-26 PROBLEM — R00.2 HEART PALPITATIONS: Status: ACTIVE | Noted: 2025-01-02

## 2025-08-26 PROBLEM — M16.10 PRIMARY LOCALIZED OSTEOARTHRITIS OF PELVIC REGION AND THIGH: Status: ACTIVE | Noted: 2025-08-26

## 2025-08-26 PROBLEM — G43.909 MIGRAINE: Status: ACTIVE | Noted: 2025-01-20

## 2025-08-26 PROBLEM — F11.21 OPIOID DEPENDENCE IN REMISSION (HCC): Status: ACTIVE | Noted: 2025-08-26

## 2025-08-26 PROBLEM — K86.89 PANCREATIC INSUFFICIENCY (HCC): Status: ACTIVE | Noted: 2025-02-05

## 2025-08-26 PROBLEM — M43.10 SPONDYLOLISTHESIS: Status: ACTIVE | Noted: 2025-08-26

## 2025-08-26 PROBLEM — M41.20 IDIOPATHIC SCOLIOSIS AND KYPHOSCOLIOSIS: Status: ACTIVE | Noted: 2021-04-27

## 2025-08-26 PROBLEM — M54.9 BACK PAIN: Status: ACTIVE | Noted: 2025-08-26

## 2025-08-26 PROBLEM — J30.2 SEASONAL ALLERGIES: Status: ACTIVE | Noted: 2025-08-26

## 2025-08-26 PROBLEM — G90.9 AUTONOMIC NEUROPATHY: Status: ACTIVE | Noted: 2025-03-03

## 2025-08-26 PROBLEM — K21.9 GASTROESOPHAGEAL REFLUX DISEASE: Status: ACTIVE | Noted: 2025-01-20

## 2025-08-26 PROCEDURE — 99202 OFFICE O/P NEW SF 15 MIN: CPT | Performed by: NEUROLOGICAL SURGERY

## 2025-08-27 ENCOUNTER — OFFICE VISIT (OUTPATIENT)
Facility: LOCATION | Age: 70
End: 2025-08-27

## 2025-08-27 DIAGNOSIS — C44.329 SQUAMOUS CELL CANCER OF SKIN OF LEFT CHEEK: Primary | ICD-10-CM

## 2025-08-27 PROCEDURE — 99214 OFFICE O/P EST MOD 30 MIN: CPT | Performed by: STUDENT IN AN ORGANIZED HEALTH CARE EDUCATION/TRAINING PROGRAM

## 2025-08-28 DIAGNOSIS — C44.329 SQUAMOUS CELL CANCER OF SKIN OF LEFT CHEEK: Primary | ICD-10-CM

## (undated) DIAGNOSIS — E78.00 PURE HYPERCHOLESTEROLEMIA: ICD-10-CM

## (undated) DEVICE — Device

## (undated) DEVICE — GLOVE SURG SENSICARE SZ 7

## (undated) DEVICE — PAIN TRAY: Brand: MEDLINE INDUSTRIES, INC.

## (undated) DEVICE — GLOVE SURG SENSICARE SZ 7-1/2

## (undated) DEVICE — REMOVER DURAPREP 3M

## (undated) DEVICE — NEEDLE SPINAL 22X5 405148

## (undated) DEVICE — GAUZE SPONGES,USP TYPE VII GAUZE, 12 PLY: Brand: CURITY

## (undated) DEVICE — 3M™ TEGADERM™ TRANSPARENT FILM DRESSING, 1626W, 4 IN X 4-3/4 IN (10 CM X 12 CM), 50 EACH/CARTON, 4 CARTON/CASE: Brand: 3M™ TEGADERM™

## (undated) DEVICE — AVANOS* TUOHY EPIDURAL NEEDLE: Brand: AVANOS

## (undated) DEVICE — MARKER PRE-SURGICAL MINI DISP

## (undated) DEVICE — GAMMEX® PI HYBRID SIZE 7.5, STERILE POWDER-FREE SURGICAL GLOVE, POLYISOPRENE AND NEOPRENE BLEND: Brand: GAMMEX

## (undated) DEVICE — BANDAID COVERLET 1X3

## (undated) DEVICE — DRAPE C-ARM UNIVERSAL

## (undated) DEVICE — ACCESSORY KIT

## (undated) DEVICE — 3M(TM) TEGADERM(TM) TRANSPARENT FILM DRESSING FRAME STYLE 1628: Brand: 3M™ TEGADERM™

## (undated) DEVICE — GLOVE SURG SENSICARE SZ 6-1/2

## (undated) DEVICE — GAMMEX® PI HYBRID SIZE 7, STERILE POWDER-FREE SURGICAL GLOVE, POLYISOPRENE AND NEOPRENE BLEND: Brand: GAMMEX

## (undated) DEVICE — NEEDLE SPINAL 22X3-1/2 BLK

## (undated) DEVICE — SYRINGE 10ML LL CONTRL SYRINGE

## (undated) DEVICE — MARKER SKIN 2 TIP

## (undated) DEVICE — SHEET, T, LAPAROTOMY, STERILE: Brand: MEDLINE

## (undated) DEVICE — SUTURE SILK 2-0 FSL

## (undated) NOTE — MR AVS SNAPSHOT
[address], [address] [place] [address] [phone]               Thank you for choosing us for your health care visit with Zenia Jones MD.  We are glad to serve you and happy to provide you with th or by on call physicians. ? By law, narcotics cannot be faxed or phoned into your pharmacy. The prescription must be signed by the provider, picked up in our office and physically brought to the pharmacy.   A 30 day supply with no refills is the maximum al diligence, the insurance carrier gives the disclaimer that \"Although the procedure is authorized, this does not guarantee payment. \"    Ultimately the patient is responsible for payment. Thank you for your understanding in the matter.            Susan view more details from this visit by going to [email or web address]. If you've recently had a stay at the Hospital you can access your discharge instructions in [facility] by going to Visits < Admission Summaries.  If you've been to the Emergency Depar

## (undated) NOTE — ED AVS SNAPSHOT
[address] [address] [facility] in [address] [place] [address]    Phone:  [phone]    Fax:  [address] [place] [address]   MRN: [id number]    Department:  [address] Emergency Department in [place]   Date of Visit: - HYDROcodone-acetaminophen 5-325 MG Tabs            Discharge References/Attachments     BACK PAIN (ACUTE OR CHRONIC) (ENGLISH)      Disclosure     Insurance plans vary and the physician(s) referred by the ER may not be covered by your plan.  Please conta If you have been prescribed any medication(s), please fill your prescription right away and begin taking the medication(s) as directed    If the emergency physician has read X-rays, these will be re-interpreted by a radiologist.  If there is a significan can help with your Affordable Care Act coverage, as well as all types of Medicaid plans. To get signed up and covered, please call [phone] and ask to get set up for an insurance coverage that is in-network with [facility]ons

## (undated) NOTE — LETTER
Michelle Chamberlain 182 6 [address] E  [place], [address] [facility]    Consent for Operation  Date: __________________                                Time: _______________    1.  I authorize the performance upon Jen Tobar the following operation:  Procedure procedure has been videotaped, the surgeon will obtain the original videotape. The hospital will not be responsible for storage or maintenance of this tape.   7. For the purpose of advancing medical education, I consent to the admittance of observers to the STATEMENTS REQUIRING INSERTION OR COMPLETION WERE FILLED IN.     Signature of Patient:   ___________________________    When the patient is a minor or mentally incompetent to give consent:  Signature of person authorized to consent for patient: ____________ supplements, and pills I can buy without a prescription (including street drugs/illegal medications). Failure to inform my anesthesiologist about these medicines may increase my risk of anesthetic complications. iv.  If I am allergic to anything or have ha Anesthesiologist Signature     Date   Time  I have discussed the procedure and information above with the patient (or patient’s representative) and answered their questions. The patient or their representative has agreed to have anesthesia services.     ___

## (undated) NOTE — LETTER
Date: 2020    Patient Name: Bartolo Lew                          JH 1955         To Whom it may concern:         It is a medical necessity that this patient be provided with a mobility scooter due to her chronic back issues, to aide in mob

## (undated) NOTE — ED AVS SNAPSHOT
Peyton Zhu   MRN: [id number]    Department:  THE [facility] Emergency Department in [place]   Date of Visit:  8/13/2017           Disclosure     Insurance plans vary and the physician(s) referred by the ER may not be covered by your plan.  Please contac If you have been prescribed any medication(s), please fill your prescription right away and begin taking the medication(s) as directed    If the emergency physician has read X-rays, these will be re-interpreted by a radiologist.  If there is a significant

## (undated) NOTE — LETTER
11/07/19        [address] [place] [address]      Dear Yousif Triana,    [facility] records indicate that you have outstanding lab work and or testing that was ordered for you and has not yet been completed:  Orders Placed This Encounter

## (undated) NOTE — MR AVS SNAPSHOT
[facility] [address], [address] [address]               Thank you for choosing us for your health care visit with Ayan Moon MD.  We are glad to serve you and happy to provide you with th Instructions: To schedule an appointment for your radiology test please call Anastacia Le Scheduling at [phone].          Referral Details     Referred By    Referred To    MOISE Coe   [address] [address] your appointment immediately. However, if you are unsure about the requirements for authorization, please wait 5-7 days and then contact your physician's   office.  At that time, you will be provided with any authorization numbers or be assured that none ar ? EFFECTIVE April 1, 2017 PATIENTS MUST  THEIR OWN NARCOTIC PRESCRIPTIONS. ? Written prescriptions must be picked up in office. ? Please allow the office 48-72 hours to fill the prescription. ? Patient must present photo ID at time of . Do not eat or drink anything (including water) 6 hours prior to your procedure. ? Please take your morning blood pressure medication with a small sip of water. ? You are required to have a responsible adult drive you home after your procedure.  You may n · Fish oil, krill oil, vitamin E                                      Insurance Authorization:   Most insurances are now requiring a preauthorization for all procedures.   In the event that your insurance does not authorize your procedure within 48 hours of become irritated and cause pain that can either stay in one place or radiate to other parts of your body like your legs. What is the purpose of an epidural injection?   The long acting anti-inflammatory medication, or steroid, that is injected reduces infl you may have some soreness or aching for several days. You should start to notice pain relief around the third day or so. Can I go back to work the next day? You should be able to go back to work the next day.   Again it is normal to feel some soreness o ? Slight fever   ? Hiccups   ? Insomnia   ? Headache   ? Water retention   ? Increased appetite   ? Increased heart rate   ? Abdominal cramping or bloating   You should contact our office immediately if you experience any side effects.   If they occur, thes Take 1 tablet (150 mcg total) by mouth before breakfast.           Vitamin D3 2000 units Caps   Take 2,000 Units by mouth daily. Commonly known as:  VITAMIN D3           * Notice:   This list has 2 medication(s) that are the same as other medications pres HOW TO GET STARTED: HOW TO STAY MOTIVATED:   Start activities slowly and build up over time Do what you like   Get your heart pumping – brisk walking, biking, swimming Even 10 minute increments are effective and add up over the week   2 ½ hours per week –

## (undated) NOTE — LETTER
12/10/19        [address]      Dear Maria Luisa Hogue,    [address] [facility] records indicate that you have outstanding lab work and or testing that was ordered for you and has not yet been completed:  Orders Placed This Encounter    SHIN

## (undated) NOTE — LETTER
Date: 10/22/2019    Patient Name: Chestine Lanes          To Whom it may concern: It is a medical necessity that Yumi use her walker to assist in her mobility. Please contact the office with any questions. [phone].            Sincerely,

## (undated) NOTE — MR AVS SNAPSHOT
[facility], [place] [address] [facility], [address] [place] [phone]               Thank you for choosing us for your health care visit with Dennis Buerger, MD.  We are glad to serve you and happy to provide you with this ? Please allow the office 48-72 hours to fill the prescription. ? Patient must present photo ID at time of .   If a designated family member will be picking up prescription, office must be given name of individual in advance and they must present a Allergies as of Jan 26, 2017     Adhesive Tape Rash, Itching    Grass Itching, Other (See Comments)    Headaches with blood shot eyes    Latex Itching    Other     Patient experiences a yeast infection with any antibiotic administration please treat for ye your doctor or other care provider to review them with you.             Today's Orders     MRI SPINE LUMBAR ([id number]) [[id number]]    Complete by:  Jan 26, 2017 (Approximate)    Assoc Dx:  Lumbar radiculopathy [M54.16], DDD (degenerative disc disease), lumbo office, you can view your past visit information in [employer] by going to Visits < Visit Summaries. [employer] questions? Call [phone] for help. [employer] is NOT to be used for urgent needs. For medical emergencies, dial 911.            Visit [facility]

## (undated) NOTE — IP AVS SNAPSHOT
[facility] [place] [address] [place], [address] ~ [phone]                Discharge Summary   6/5/2017    Rodrigo Holt           Admission Information        Provider Department    6/5/2017 Pratibha Harp MD  [facility] Commonly known as:  [facility]        10 mEq daily.       [    ]    [    ]    [    ]    [    ]       Potassium Chloride ER 10 MEQ Tbcr   Commonly known as:  [facility]        TAKE 1 TABLET BY MOUTH TWICE DAILY    Latonya De Los Santos     [    ]    [    ]    [    ]    [ 9. Lightheadedness or nausea may occur and should resolve within twenty-four  hours. 10.  You may experience a sedative effect that affects your bladder.  Even if you do not feel the urge to urinate, make sure that you do within eight hours of the procedur 2.13 (04/13/17)  0.46 (04/13/17)  0.29 (04/13/17)  2.52      Metabolic Lab Results  (Last result in the past 90 days)    HgbA1C Glucose BUN Creatinine Calcium Alkaline Phosph AST    -- (04/13/17)  80 (04/13/17)  23 (H) (04/13/17)  0.93 (04/13/17)  9.1 (04/ Summaries. If you've been to the Emergency Department or your doctor's office, you can view your past visit information in [employer] by going to Visits < Visit Summaries. [employer] questions? Call [phone] for help.   [employer] is NOT to be used for urge

## (undated) NOTE — ED AVS SNAPSHOT
Mikayla Rincon   MRN: [id number]    Department:  THE [facility] Emergency Department in [place]   Date of Visit:  6/8/2019           Disclosure     Insurance plans vary and the physician(s) referred by the ER may not be covered by your plan.  Please contact tell this physician (or your personal doctor if your instructions are to return to your personal doctor) about any new or lasting problems. The primary care or specialist physician will see patients referred from the [facility] Emergency Department.  Oscar Allen

## (undated) NOTE — ED AVS SNAPSHOT
THE [facility] Emergency Department in [address] [facility]    Phone:  [phone]    Fax:  [address] [place] [address]   MRN: [id number]    Department:  THE [facility] Emergency Department in [place]   Date of Visit: IF THERE IS ANY CHANGE OR WORSENING OF YOUR CONDITION, CALL YOUR PRIMARY CARE PHYSICIAN AT ONCE OR RETURN IMMEDIATELY TO THE EMERGENCY DEPARTMENT.     If you have been prescribed any medication(s), please fill your prescription right away and begin taking t

## (undated) NOTE — LETTER
10/08/18    RE: Quita Yu    : 1955    Dear Dr. Valente Jenkins    Your patient is being scheduled for a pain management procedure at [facility].    Procedure:  Sacroiliac joint injection and lumbar epidural steroid injection, both with sedation

## (undated) NOTE — LETTER
11/08/19      To Whom it May Concern    RE: Mor Patel  11/2/1955    Due to patient's medical condition she will need to be accommodated to park in the closest parking spot to the entrance. If there are any further questions please contact my office.

## (undated) NOTE — LETTER
11/09/18    [address]  PRE-PROCEDURE INSTRUCTIONS WITH IV SEDATION      Appointment Date: 12/06/18   Check-In Time: 2:45pm        Procedure:  Lumbar Epidural Steroid Injection    Appointment Date: 12/13/18   Check-In Time: 2:30pm        P day of procedure. If you require a refill of medications, please contact the office 48 hours prior to your procedure.   ? If you have an implanted Spinal Cord or Peripheral Nerve Stimulator: Please remember to turn device off for procedure      Medication: Please call our office with any questions or concerns before or after your procedure at [phone].   If you are a diabetic, please increase the frequency of your glucose monitoring after the procedure as this may cause a temporary increase in your blood